# Patient Record
Sex: MALE | Race: WHITE | NOT HISPANIC OR LATINO | Employment: OTHER | URBAN - METROPOLITAN AREA
[De-identification: names, ages, dates, MRNs, and addresses within clinical notes are randomized per-mention and may not be internally consistent; named-entity substitution may affect disease eponyms.]

---

## 2019-06-23 ENCOUNTER — HOSPITAL ENCOUNTER (EMERGENCY)
Facility: HOSPITAL | Age: 44
Discharge: HOME | End: 2019-06-23
Payer: MEDICARE

## 2019-06-23 VITALS
RESPIRATION RATE: 16 BRPM | HEART RATE: 80 BPM | WEIGHT: 240 LBS | SYSTOLIC BLOOD PRESSURE: 150 MMHG | OXYGEN SATURATION: 97 % | DIASTOLIC BLOOD PRESSURE: 78 MMHG | TEMPERATURE: 97.6 F

## 2019-06-23 DIAGNOSIS — H11.31 SUBCONJUNCTIVAL HEMORRHAGE OF RIGHT EYE: Primary | ICD-10-CM

## 2019-06-23 PROCEDURE — 99283 EMERGENCY DEPT VISIT LOW MDM: CPT

## 2019-06-23 NOTE — ED PROVIDER NOTES
History  Chief Complaint   Patient presents with    Eye Problem     This afternoon patient looked in the bathroom ear and noticed that his right eye had an area that was red  Patient denies eye injury or getting anything in his eye today, denies visual disturbance  Patient wears glasses but did not bring them to the ER today, visual acuity is poor about glasses  History provided by:  Patient  Eye Problem   Location:  Right eye  Chronicity:  New  Relieved by:  Nothing  Worsened by:  Nothing  Ineffective treatments:  None tried  Associated symptoms: redness    Associated symptoms: no blurred vision, no decreased vision, no discharge, no headaches, no inflammation, no itching, no nausea, no numbness, no swelling, no tearing and no weakness        None       Past Medical History:   Diagnosis Date    Cleft hard palate        History reviewed  No pertinent surgical history  History reviewed  No pertinent family history  I have reviewed and agree with the history as documented  Social History     Tobacco Use    Smoking status: Current Every Day Smoker     Packs/day: 0 50    Smokeless tobacco: Never Used   Substance Use Topics    Alcohol use: Not Currently    Drug use: Never        Review of Systems   Constitutional: Negative for chills and fever  HENT: Negative for congestion, rhinorrhea and sore throat  Eyes: Positive for redness  Negative for blurred vision, discharge and itching  Respiratory: Negative for cough and shortness of breath  Cardiovascular: Negative for chest pain  Gastrointestinal: Negative for nausea  Musculoskeletal: Negative for back pain  Skin: Negative for rash  Neurological: Negative for weakness, numbness and headaches  Physical Exam  Physical Exam   Constitutional: He is oriented to person, place, and time  He appears well-developed and well-nourished  HENT:   Head: Normocephalic and atraumatic     Right Ear: External ear normal    Left Ear: External ear normal    Nose: Nose normal    Mouth/Throat: Oropharynx is clear and moist    Eyes: Pupils are equal, round, and reactive to light  EOM are normal    Right medial conjunctiva with hemorrhage   Neck: Normal range of motion  Neck supple  Cardiovascular: Normal rate, regular rhythm and normal heart sounds  Pulmonary/Chest: Effort normal and breath sounds normal    Neurological: He is alert and oriented to person, place, and time  Skin: Skin is warm and dry  Capillary refill takes less than 2 seconds  Psychiatric: He has a normal mood and affect  His behavior is normal    Nursing note and vitals reviewed  Vital Signs  ED Triage Vitals [06/23/19 1915]   Temperature Pulse Respirations Blood Pressure SpO2   97 6 °F (36 4 °C) 85 16 151/81 97 %      Temp src Heart Rate Source Patient Position - Orthostatic VS BP Location FiO2 (%)   -- -- -- -- --      Pain Score       5           Vitals:    06/23/19 1915   BP: 151/81   Pulse: 85         Visual Acuity  Visual Acuity      Most Recent Value   Visual acuity R eye is  20/70   Visual acuity Left eye is  20/50   Visual acuity in both eyes is  20/50   Unable to obtain visual acuity due to  not wearing glasses   L Pupil Shape  Round   R Pupil Shape  Round          ED Medications  Medications - No data to display    Diagnostic Studies  Results Reviewed     None                 No orders to display              Procedures  Procedures       ED Course                               MDM    Disposition  Final diagnoses:   Subconjunctival hemorrhage of right eye     Time reflects when diagnosis was documented in both MDM as applicable and the Disposition within this note     Time User Action Codes Description Comment    6/23/2019  7:30 PM Marcos Mendez Add [H11 31] Subconjunctival hemorrhage of right eye       ED Disposition     ED Disposition Condition Date/Time Comment    Discharge Stable Sun Jun 23, 2019  7:30 PM Jennifer Alberto discharge to home/self care  Follow-up Information     Follow up With Specialties Details Why [address] [facility] Ophthalmology - [facility] Ophthalmology Call  As needed [address] [place] [address]  [phone]            Patient's Medications    No medications on file     No discharge procedures on file      ED Provider  Electronically Signed by           Emily Nuñez PA-C  06/23/19 2307

## 2019-10-27 ENCOUNTER — APPOINTMENT (EMERGENCY)
Dept: CT IMAGING | Facility: HOSPITAL | Age: 44
End: 2019-10-27
Payer: MEDICARE

## 2019-10-27 ENCOUNTER — HOSPITAL ENCOUNTER (OUTPATIENT)
Facility: HOSPITAL | Age: 44
Setting detail: OBSERVATION
Discharge: HOME HEALTH | End: 2019-10-28
Attending: INTERNAL MEDICINE | Admitting: HOSPITALIST
Payer: MEDICARE

## 2019-10-27 ENCOUNTER — APPOINTMENT (EMERGENCY)
Dept: RADIOLOGY | Facility: HOSPITAL | Age: 44
End: 2019-10-27
Payer: MEDICARE

## 2019-10-27 DIAGNOSIS — R56.9 SEIZURE (HCC): ICD-10-CM

## 2019-10-27 DIAGNOSIS — G40.909 SEIZURE DISORDER (HCC): Primary | ICD-10-CM

## 2019-10-27 DIAGNOSIS — F79 MENTALLY CHALLENGED: Chronic | ICD-10-CM
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
## 2019-10-27 PROBLEM — Z72.0 TOBACCO ABUSE: Chronic | Status: ACTIVE | Noted: 2019-10-27

## 2019-10-27 LAB
ALBUMIN SERPL BCP-MCNC: 4.2 G/DL (ref 3.5–5.7)
ALP SERPL-CCNC: 170 U/L (ref 40–150)
ALT SERPL W P-5'-P-CCNC: 15 U/L (ref 7–52)
AMPHETAMINES SERPL QL SCN: NEGATIVE
ANION GAP SERPL CALCULATED.3IONS-SCNC: 12 MMOL/L (ref 4–13)
AST SERPL W P-5'-P-CCNC: 22 U/L (ref 13–39)
BARBITURATES UR QL: NEGATIVE
BASOPHILS # BLD AUTO: 0 THOUSANDS/ΜL (ref 0–0.1)
BASOPHILS NFR BLD AUTO: 1 % (ref 0–2)
BENZODIAZ UR QL: NEGATIVE
BILIRUB SERPL-MCNC: 0.6 MG/DL (ref 0.2–1)
BILIRUB UR QL STRIP: NEGATIVE
BUN SERPL-MCNC: 10 MG/DL (ref 7–25)
CALCIUM SERPL-MCNC: 9.3 MG/DL (ref 8.6–10.5)
CHLORIDE SERPL-SCNC: 101 MMOL/L (ref 98–107)
CLARITY UR: CLEAR
CO2 SERPL-SCNC: 23 MMOL/L (ref 21–31)
COCAINE UR QL: NEGATIVE
COLOR UR: YELLOW
CREAT SERPL-MCNC: 0.84 MG/DL (ref 0.7–1.3)
EOSINOPHIL # BLD AUTO: 0.1 THOUSAND/ΜL (ref 0–0.61)
EOSINOPHIL NFR BLD AUTO: 1 % (ref 0–5)
ERYTHROCYTE [DISTWIDTH] IN BLOOD BY AUTOMATED COUNT: 14.6 % (ref 11.5–14.5)
GFR SERPL CREATININE-BSD FRML MDRD: 108 ML/MIN/1.73SQ M
GLUCOSE SERPL-MCNC: 102 MG/DL (ref 65–99)
GLUCOSE SERPL-MCNC: 88 MG/DL (ref 65–140)
GLUCOSE UR STRIP-MCNC: NEGATIVE MG/DL
HCT VFR BLD AUTO: 42.1 % (ref 42–47)
HGB BLD-MCNC: 14.2 G/DL (ref 14–18)
HGB UR QL STRIP.AUTO: NEGATIVE
KETONES UR STRIP-MCNC: NEGATIVE MG/DL
LEUKOCYTE ESTERASE UR QL STRIP: NEGATIVE
LYMPHOCYTES # BLD AUTO: 1.1 THOUSANDS/ΜL (ref 0.6–4.47)
LYMPHOCYTES NFR BLD AUTO: 15 % (ref 21–51)
MCH RBC QN AUTO: 30.4 PG (ref 26–34)
MCHC RBC AUTO-ENTMCNC: 33.7 G/DL (ref 31–37)
MCV RBC AUTO: 90 FL (ref 81–99)
METHADONE UR QL: NEGATIVE
MONOCYTES # BLD AUTO: 0.6 THOUSAND/ΜL (ref 0.17–1.22)
MONOCYTES NFR BLD AUTO: 8 % (ref 2–12)
NEUTROPHILS # BLD AUTO: 5.9 THOUSANDS/ΜL (ref 1.4–6.5)
NEUTS SEG NFR BLD AUTO: 76 % (ref 42–75)
NITRITE UR QL STRIP: NEGATIVE
OPIATES UR QL SCN: NEGATIVE
PCP UR QL: NEGATIVE
PH UR STRIP.AUTO: 5.5 [PH]
PLATELET # BLD AUTO: 321 THOUSANDS/UL (ref 149–390)
PMV BLD AUTO: 8.1 FL (ref 8.6–11.7)
POTASSIUM SERPL-SCNC: 3.5 MMOL/L (ref 3.5–5.5)
PROT SERPL-MCNC: 7.3 G/DL (ref 6.4–8.9)
PROT UR STRIP-MCNC: NEGATIVE MG/DL
RBC # BLD AUTO: 4.66 MILLION/UL (ref 4.3–5.9)
SODIUM SERPL-SCNC: 136 MMOL/L (ref 134–143)
SP GR UR STRIP.AUTO: <=1.005 (ref 1–1.03)
THC UR QL: NEGATIVE
TROPONIN I SERPL-MCNC: <0.03 NG/ML
UROBILINOGEN UR QL STRIP.AUTO: 0.2 E.U./DL
WBC # BLD AUTO: 7.7 THOUSAND/UL (ref 4.8–10.8)

## 2019-10-27 PROCEDURE — 82948 REAGENT STRIP/BLOOD GLUCOSE: CPT

## 2019-10-27 PROCEDURE — 96374 THER/PROPH/DIAG INJ IV PUSH: CPT

## 2019-10-27 PROCEDURE — 93005 ELECTROCARDIOGRAM TRACING: CPT

## 2019-10-27 PROCEDURE — 81003 URINALYSIS AUTO W/O SCOPE: CPT | Performed by: INTERNAL MEDICINE

## 2019-10-27 PROCEDURE — 99285 EMERGENCY DEPT VISIT HI MDM: CPT

## 2019-10-27 PROCEDURE — 84484 ASSAY OF TROPONIN QUANT: CPT | Performed by: INTERNAL MEDICINE

## 2019-10-27 PROCEDURE — 85025 COMPLETE CBC W/AUTO DIFF WBC: CPT | Performed by: INTERNAL MEDICINE

## 2019-10-27 PROCEDURE — 72100 X-RAY EXAM L-S SPINE 2/3 VWS: CPT

## 2019-10-27 PROCEDURE — 99220 PR INITIAL OBSERVATION CARE/DAY 70 MINUTES: CPT | Performed by: NURSE PRACTITIONER

## 2019-10-27 PROCEDURE — 80053 COMPREHEN METABOLIC PANEL: CPT | Performed by: INTERNAL MEDICINE

## 2019-10-27 PROCEDURE — 70450 CT HEAD/BRAIN W/O DYE: CPT

## 2019-10-27 PROCEDURE — 36415 COLL VENOUS BLD VENIPUNCTURE: CPT | Performed by: INTERNAL MEDICINE

## 2019-10-27 PROCEDURE — 80307 DRUG TEST PRSMV CHEM ANLYZR: CPT | Performed by: INTERNAL MEDICINE

## 2019-10-27 RX ORDER — FOLIC ACID 1 MG/1
1 TABLET ORAL DAILY
Status: DISCONTINUED | OUTPATIENT
Start: 2019-10-28 | End: 2019-10-28 | Stop reason: HOSPADM

## 2019-10-27 RX ORDER — NICOTINE 21 MG/24HR
1 PATCH, TRANSDERMAL 24 HOURS TRANSDERMAL DAILY
Status: DISCONTINUED | OUTPATIENT
Start: 2019-10-28 | End: 2019-10-28 | Stop reason: HOSPADM

## 2019-10-27 RX ORDER — THIAMINE MONONITRATE (VIT B1) 100 MG
100 TABLET ORAL DAILY
Status: DISCONTINUED | OUTPATIENT
Start: 2019-10-28 | End: 2019-10-28 | Stop reason: HOSPADM

## 2019-10-27 RX ORDER — ACETAMINOPHEN 325 MG/1
650 TABLET ORAL EVERY 6 HOURS PRN
Status: DISCONTINUED | OUTPATIENT
Start: 2019-10-27 | End: 2019-10-28 | Stop reason: HOSPADM

## 2019-10-27 RX ORDER — ONDANSETRON 2 MG/ML
4 INJECTION INTRAMUSCULAR; INTRAVENOUS EVERY 6 HOURS PRN
Status: DISCONTINUED | OUTPATIENT
Start: 2019-10-27 | End: 2019-10-28 | Stop reason: HOSPADM

## 2019-10-27 RX ORDER — LORAZEPAM 2 MG/ML
1 INJECTION INTRAMUSCULAR EVERY 8 HOURS PRN
Status: DISCONTINUED | OUTPATIENT
Start: 2019-10-27 | End: 2019-10-28 | Stop reason: HOSPADM

## 2019-10-27 RX ORDER — LORAZEPAM 2 MG/ML
1 INJECTION INTRAMUSCULAR ONCE
Status: COMPLETED | OUTPATIENT
Start: 2019-10-27 | End: 2019-10-27

## 2019-10-27 RX ADMIN — LORAZEPAM 1 MG: 2 INJECTION INTRAMUSCULAR; INTRAVENOUS at 19:41

## 2019-10-27 NOTE — ED NOTES
Pt's friend visisting with pt, he came from room stating that the pt was having a seizure  This rn witnessed the pt to be convulsing his arms and legs with his eyes closed  This lasted less than 30 seconds  Dr Juliano Sanchez came to bedside  Pt then again began to shake his arms and legs  I told pt in a loud voice to "stop" and he did  Pt then opened his eyes and conversed with me  Pt did receive 1mg IV ativan  Will continue to monitor        Trevor Portillo RN  10/27/19 5608

## 2019-10-27 NOTE — ED PROVIDER NOTES
History  Chief Complaint   Patient presents with   Osker Ok Fall     pt's family heard bang from pt's room, per ems family states that the pt "appeared to have a seizure " Pt only reports leg pain at this time  43-year-old rise the EMS status post fall  Patient suffers from mental retardation and is unable to give any type of accurate history  States sometimes his legs just feel weak and they give out  Patient believes he landed on his backside his gluteus area and complains of no pain at this time  He does complain of occasional intermittent bilateral leg weakness  Patient's mother went upstairs when she heard him fall states he was laying on the floor and she was unsure if he was having a seizure he was awake and alert  There is no report of urinary or fecal incontinence, no facial droop or hemiparesis  Patient is not been ill recently and he is not sure if he tripped and fell or if his legs just gave way  At this time he has no complaint at all  The patient is having a a apparent seizure in the emergency room at this time  He is flailing his legs and rolling his head back and forth  There is no nausea or vomiting  Is not responding at this time  The episode lasted about 15-20 seconds  His friend and mother are present at the bedside at this time and states this was all going on upstairs  None       Past Medical History:   Diagnosis Date    Cleft hard palate     Seizures ([facility] [address] )        History reviewed  No pertinent surgical history  History reviewed  No pertinent family history  I have reviewed and agree with the history as documented  Social History     Tobacco Use    Smoking status: Current Every Day Smoker     Packs/day: 0 50    Smokeless tobacco: Never Used   Substance Use Topics    Alcohol use: Not Currently    Drug use: Never        Review of Systems   Constitutional: Negative  HENT: Negative  Eyes: Negative  Respiratory: Negative  Cardiovascular: Negative  Gastrointestinal: Negative  Endocrine: Negative  Genitourinary: Negative  Musculoskeletal: Negative  Skin: Negative  Neurological: Negative  Hematological: Negative  Psychiatric/Behavioral: Negative  Physical Exam  Physical Exam   Constitutional: He appears well-developed and well-nourished  HENT:   Head: Normocephalic and atraumatic  Right Ear: External ear normal    Left Ear: External ear normal    Eyes: Pupils are equal, round, and reactive to light  EOM are normal    Neck: Normal range of motion  Neck supple  Cardiovascular: Normal rate, regular rhythm, normal heart sounds and intact distal pulses  Pulmonary/Chest: Effort normal and breath sounds normal    Abdominal: Soft  Bowel sounds are normal    Musculoskeletal: Normal range of motion  Neurological: He is alert  Patient is alert oriented x2 only  The patient suffers from MR  Skin: Skin is warm and dry  Psychiatric: He has a normal mood and affect  His behavior is normal  Judgment and thought content normal    Patient mood and behavior normal at this time considering the patient suffers from MR he is comfortable he has answered questions believe appropriate for his mental status  His mother is on his way here and will discuss the case with him  Nursing note and vitals reviewed        Vital Signs  ED Triage Vitals [10/27/19 1915]   Temperature Pulse Respirations Blood Pressure SpO2   98 7 °F (37 1 °C) 93 18 164/96 95 %      Temp Source Heart Rate Source Patient Position - Orthostatic VS BP Location FiO2 (%)   Temporal Monitor Sitting Left arm --      Pain Score       3           Vitals:    10/28/19 1000 10/28/19 1100 10/28/19 1200 10/28/19 1300   BP: 131/69 121/77 124/76 137/72   Pulse: 78 78 77 84   Patient Position - Orthostatic VS: Lying Lying Lying Lying         Visual Acuity      ED Medications  Medications   LORazepam (ATIVAN) 2 mg/mL injection 1 mg (1 mg Intravenous Given 10/27/19 1941)   influenza vaccine, quadrivalent (FLUARIX) IM injection 0 5 mL (0 5 mL Intramuscular Given 10/28/19 1423)       Diagnostic Studies  Results Reviewed     Procedure Component Value Units Date/Time    Rapid drug screen, urine [[id number]]  (Normal) Collected:  10/27/19 2019    Lab Status:  Final result Specimen:  Urine, Clean Catch Updated:  10/27/19 2039     Amph/Meth UR Negative     Barbiturate Ur Negative     Benzodiazepine Urine Negative     Cocaine Urine Negative     Methadone Urine Negative     Opiate Urine Negative     PCP Ur Negative     THC Urine Negative    Narrative:       FOR MEDICAL PURPOSES ONLY  IF CONFIRMATION NEEDED PLEASE CONTACT THE LAB WITHIN 5 DAYS      Drug Screen Cutoff Levels:  AMPHETAMINE/METHAMPHETAMINES  1000 ng/mL  BARBITURATES     200 ng/mL  BENZODIAZEPINES     200 ng/mL  COCAINE      300 ng/mL  METHADONE      300 ng/mL  OPIATES      300 ng/mL  PHENCYCLIDINE     25 ng/mL  THC       50 ng/mL      UA w Reflex to Microscopic w Reflex to Culture [[id number]]  (Abnormal) Collected:  10/27/19 2019    Lab Status:  Final result Specimen:  Urine, Clean Catch Updated:  10/27/19 2027     Color, UA Yellow     Clarity, UA Clear     Specific Gravity, UA <=1 005     pH, UA 5 5     Leukocytes, UA Negative     Nitrite, UA Negative     Protein, UA Negative mg/dl      Glucose, UA Negative mg/dl      Ketones, UA Negative mg/dl      Urobilinogen, UA 0 2 E U /dl      Bilirubin, UA Negative     Blood, UA Negative    Troponin I [[id number]]  (Normal) Collected:  10/27/19 1928    Lab Status:  Final result Specimen:  Blood from Hand, Left Updated:  10/27/19 1955     Troponin I <0 03 ng/mL     Comprehensive metabolic panel [[id number]]  (Abnormal) Collected:  10/27/19 1928    Lab Status:  Final result Specimen:  Blood from Hand, Left Updated:  10/27/19 1954     Sodium 136 mmol/L      Potassium 3 5 mmol/L      Chloride 101 mmol/L      CO2 23 mmol/L      ANION GAP 12 mmol/L      BUN 10 mg/dL      Creatinine 0 84 mg/dL      Glucose 102 mg/dL      Calcium 9 3 mg/dL      AST 22 U/L      ALT 15 U/L      Alkaline Phosphatase 170 U/L      Total Protein 7 3 g/dL      Albumin 4 2 g/dL      Total Bilirubin 0 60 mg/dL      eGFR 108 ml/min/1 73sq m     Narrative:       [place] guidelines for Chronic Kidney Disease (CKD):     Stage 1 with normal or high GFR (GFR > 90 mL/min/1 73 square meters)    Stage 2 Mild CKD (GFR = 60-89 mL/min/1 73 square meters)    Stage 3A Moderate CKD (GFR = 45-59 mL/min/1 73 square meters)    Stage 3B Moderate CKD (GFR = 30-44 mL/min/1 73 square meters)    Stage 4 Severe CKD (GFR = 15-29 mL/min/1 73 square meters)    Stage 5 End Stage CKD (GFR <15 mL/min/1 73 square meters)  Note: GFR calculation is accurate only with a steady state creatinine    CBC and differential [[id number]]  (Abnormal) Collected:  10/27/19 1928    Lab Status:  Final result Specimen:  Blood from Hand, Left Updated:  10/27/19 1935     WBC 7 70 Thousand/uL      RBC 4 66 Million/uL      Hemoglobin 14 2 g/dL      Hematocrit 42 1 %      MCV 90 fL      MCH 30 4 pg      MCHC 33 7 g/dL      RDW 14 6 %      MPV 8 1 fL      Platelets 594 Thousands/uL      Neutrophils Relative 76 %      Lymphocytes Relative 15 %      Monocytes Relative 8 %      Eosinophils Relative 1 %      Basophils Relative 1 %      Neutrophils Absolute 5 90 Thousands/µL      Lymphocytes Absolute 1 10 Thousands/µL      Monocytes Absolute 0 60 Thousand/µL      Eosinophils Absolute 0 10 Thousand/µL      Basophils Absolute 0 00 Thousands/µL                  XR spine lumbar 2 or 3 views injury   ED Interpretation by Scooby Turk MD (10/27 2017)   X-RAY LS SPINE SHOWS NORMAL DISC SPACE HEIGHT NO COMPRESSION NO EVIDENCE OF LS FRACTURE  Final Result by Francisco Noel MD (10/28 1686)      Unremarkable examination           Workstation performed: [id number]         CT head without contrast   ED Interpretation by Scooby Turk MD (10/27 2052)      No acute intracranial abnormality  Workstation performed: [id number]         Final Result by Debbie Reyna MD (10/27 2029)      No acute intracranial abnormality  Workstation performed: [id number]                    Procedures  Procedures       ED Course                               MDM    Disposition  Final diagnoses:   Seizure [facility])     Time reflects when diagnosis was documented in both MDM as applicable and the Disposition within this note     Time User Action Codes Description Comment    10/27/2019  9:13 PM Wenda John Add [C77 [id number]] Seizure disorder ([facility] [address] )     10/27/2019  9:13 PM Wenda John Modify [G40 [id number]] Seizure disorder ([facility] [address] )     10/27/2019  9:13 PM Wenda John Add Mikhail Tete Mentally challenged     10/27/2019  9:13 PM Wenda John Modify Mikhail Tete Mentally challenged     11/1/2019 12:56 AM Trinity Anand Add [R56 9] Seizure [facility])       ED Disposition     ED Disposition Condition Date/Time Comment    Admit Stable Sun Oct 27, 2019  8:59 PM Case was discussed with PA and the patient's admission status was agreed to be to the service of Dr Mace Chain up With Specialties Details Why Contact Info    Lenin Bolaños [facility]  Follow up A representative from [facility] will contact you to begin services [address] [place] [address]  [phone]            There are no discharge medications for this patient  Outpatient Discharge Orders   Walker Rolling     Ambulatory Referral to Home Health   Standing Status: Future Standing Exp  Date: 10/28/20      Ambulatory referral to Neurology   Standing Status: Future Standing Exp   Date: 10/28/20      Call provider for:  severe uncontrolled pain     Call provider for:  difficulty breathing, headache or visual disturbances     Call provider for:  persistent dizziness or light-headedness     Activity as tolerated     No driving until       ED Provider  Electronically Signed by           Floresita Espinosa MD  10/27/19 8417       Floresita Espinosa MD  10/27/19 1847       Floresita Espinosa MD  11/01/19 0958       Floresita Espinosa MD  11/01/19 2092       Floresita Espinosa MD  11/01/19 2541

## 2019-10-28 VITALS
OXYGEN SATURATION: 95 % | RESPIRATION RATE: 25 BRPM | BODY MASS INDEX: 30.42 KG/M2 | HEART RATE: 84 BPM | DIASTOLIC BLOOD PRESSURE: 72 MMHG | WEIGHT: 236.99 LBS | SYSTOLIC BLOOD PRESSURE: 137 MMHG | TEMPERATURE: 98.1 F | HEIGHT: 74 IN

## 2019-10-28 LAB
ALBUMIN SERPL BCP-MCNC: 4 G/DL (ref 3.5–5.7)
ALP SERPL-CCNC: 158 U/L (ref 40–150)
ALT SERPL W P-5'-P-CCNC: 15 U/L (ref 7–52)
ANION GAP SERPL CALCULATED.3IONS-SCNC: 10 MMOL/L (ref 4–13)
AST SERPL W P-5'-P-CCNC: 21 U/L (ref 13–39)
ATRIAL RATE: 79 BPM
BASOPHILS # BLD AUTO: 0 THOUSANDS/ΜL (ref 0–0.1)
BASOPHILS NFR BLD AUTO: 1 % (ref 0–2)
BILIRUB SERPL-MCNC: 0.7 MG/DL (ref 0.2–1)
BUN SERPL-MCNC: 11 MG/DL (ref 7–25)
CALCIUM SERPL-MCNC: 9.4 MG/DL (ref 8.6–10.5)
CHLORIDE SERPL-SCNC: 104 MMOL/L (ref 98–107)
CO2 SERPL-SCNC: 25 MMOL/L (ref 21–31)
CREAT SERPL-MCNC: 0.84 MG/DL (ref 0.7–1.3)
EOSINOPHIL # BLD AUTO: 0.2 THOUSAND/ΜL (ref 0–0.61)
EOSINOPHIL NFR BLD AUTO: 2 % (ref 0–5)
ERYTHROCYTE [DISTWIDTH] IN BLOOD BY AUTOMATED COUNT: 14.6 % (ref 11.5–14.5)
EST. AVERAGE GLUCOSE BLD GHB EST-MCNC: 117 MG/DL
GFR SERPL CREATININE-BSD FRML MDRD: 108 ML/MIN/1.73SQ M
GLUCOSE P FAST SERPL-MCNC: 101 MG/DL (ref 65–99)
GLUCOSE SERPL-MCNC: 101 MG/DL (ref 65–99)
GLUCOSE SERPL-MCNC: 82 MG/DL (ref 65–140)
GLUCOSE SERPL-MCNC: 91 MG/DL (ref 65–140)
HBA1C MFR BLD: 5.7 % (ref 4.2–6.3)
HCT VFR BLD AUTO: 41.1 % (ref 42–47)
HGB BLD-MCNC: 14.5 G/DL (ref 14–18)
LYMPHOCYTES # BLD AUTO: 1.6 THOUSANDS/ΜL (ref 0.6–4.47)
LYMPHOCYTES NFR BLD AUTO: 22 % (ref 21–51)
MCH RBC QN AUTO: 31.3 PG (ref 26–34)
MCHC RBC AUTO-ENTMCNC: 35.2 G/DL (ref 31–37)
MCV RBC AUTO: 89 FL (ref 81–99)
MONOCYTES # BLD AUTO: 0.8 THOUSAND/ΜL (ref 0.17–1.22)
MONOCYTES NFR BLD AUTO: 11 % (ref 2–12)
NEUTROPHILS # BLD AUTO: 4.9 THOUSANDS/ΜL (ref 1.4–6.5)
NEUTS SEG NFR BLD AUTO: 65 % (ref 42–75)
P AXIS: 32 DEGREES
PLATELET # BLD AUTO: 333 THOUSANDS/UL (ref 149–390)
PMV BLD AUTO: 8.7 FL (ref 8.6–11.7)
POTASSIUM SERPL-SCNC: 3.7 MMOL/L (ref 3.5–5.5)
PR INTERVAL: 116 MS
PROLACTIN SERPL-MCNC: 12.4 NG/ML (ref 2.5–17.4)
PROT SERPL-MCNC: 6.8 G/DL (ref 6.4–8.9)
QRS AXIS: 6 DEGREES
QRSD INTERVAL: 92 MS
QT INTERVAL: 388 MS
QTC INTERVAL: 444 MS
RBC # BLD AUTO: 4.62 MILLION/UL (ref 4.3–5.9)
SODIUM SERPL-SCNC: 139 MMOL/L (ref 134–143)
T WAVE AXIS: 5 DEGREES
TSH SERPL DL<=0.05 MIU/L-ACNC: 3.95 UIU/ML (ref 0.45–5.33)
VENTRICULAR RATE: 79 BPM
WBC # BLD AUTO: 7.5 THOUSAND/UL (ref 4.8–10.8)

## 2019-10-28 PROCEDURE — G8978 MOBILITY CURRENT STATUS: HCPCS

## 2019-10-28 PROCEDURE — 83036 HEMOGLOBIN GLYCOSYLATED A1C: CPT | Performed by: NURSE PRACTITIONER

## 2019-10-28 PROCEDURE — 84146 ASSAY OF PROLACTIN: CPT | Performed by: NURSE PRACTITIONER

## 2019-10-28 PROCEDURE — 99204 OFFICE O/P NEW MOD 45 MIN: CPT | Performed by: PSYCHIATRY & NEUROLOGY

## 2019-10-28 PROCEDURE — G8988 SELF CARE GOAL STATUS: HCPCS

## 2019-10-28 PROCEDURE — 85025 COMPLETE CBC W/AUTO DIFF WBC: CPT | Performed by: NURSE PRACTITIONER

## 2019-10-28 PROCEDURE — 97116 GAIT TRAINING THERAPY: CPT

## 2019-10-28 PROCEDURE — G8987 SELF CARE CURRENT STATUS: HCPCS

## 2019-10-28 PROCEDURE — 97163 PT EVAL HIGH COMPLEX 45 MIN: CPT

## 2019-10-28 PROCEDURE — 84443 ASSAY THYROID STIM HORMONE: CPT | Performed by: NURSE PRACTITIONER

## 2019-10-28 PROCEDURE — 99217 PR OBSERVATION CARE DISCHARGE MANAGEMENT: CPT | Performed by: INTERNAL MEDICINE

## 2019-10-28 PROCEDURE — G8979 MOBILITY GOAL STATUS: HCPCS

## 2019-10-28 PROCEDURE — 97167 OT EVAL HIGH COMPLEX 60 MIN: CPT

## 2019-10-28 PROCEDURE — 93010 ELECTROCARDIOGRAM REPORT: CPT | Performed by: INTERNAL MEDICINE

## 2019-10-28 PROCEDURE — 90686 IIV4 VACC NO PRSV 0.5 ML IM: CPT | Performed by: HOSPITALIST

## 2019-10-28 PROCEDURE — 82948 REAGENT STRIP/BLOOD GLUCOSE: CPT

## 2019-10-28 PROCEDURE — G0008 ADMIN INFLUENZA VIRUS VAC: HCPCS | Performed by: HOSPITALIST

## 2019-10-28 PROCEDURE — 80053 COMPREHEN METABOLIC PANEL: CPT | Performed by: NURSE PRACTITIONER

## 2019-10-28 RX ADMIN — NICOTINE 1 PATCH: 21 PATCH, EXTENDED RELEASE TRANSDERMAL at 08:33

## 2019-10-28 RX ADMIN — Medication 100 MG: at 08:32

## 2019-10-28 RX ADMIN — INFLUENZA VIRUS VACCINE 0.5 ML: 15; 15; 15; 15 SUSPENSION INTRAMUSCULAR at 14:27

## 2019-10-28 RX ADMIN — Medication 1 TABLET: at 08:33

## 2019-10-28 RX ADMIN — FOLIC ACID 1 MG: 1 TABLET ORAL at 08:33

## 2019-10-28 NOTE — PLAN OF CARE
Problem: PHYSICAL THERAPY ADULT  Goal: Performs mobility at highest level of function for planned discharge setting  See evaluation for individualized goals  Description  Treatment/Interventions: Functional transfer training, LE strengthening/ROM, Therapeutic exercise, Endurance training, Equipment eval/education, Bed mobility, Gait training, Spoke to nursing, OT  Equipment Recommended: (S) Walker(rolling, does not have at home)    See flowsheet documentation for full assessment, interventions and recommendations  Kelsie Brian, PT     Note:   Prognosis: Good  Problem List: Decreased strength, Decreased endurance, Decreased mobility, Impaired balance, Decreased cognition, Impaired judgement, Decreased safety awareness, Pain  Assessment: Pt is 44 y o  male seen for PT evaluation s/p [facility] w/ Seizure disorder ([facility] [address] )  PT consulted to assess pt's functional mobility and d/c needs  Order placed for PT eval and tx, w/ activity as tolerated order  Comorbidities affecting pt's physical performance at time of assessment include: weakness, seizure d/o, mentally challenged, tobacco abuse, fall  PTA, pt was independent w/ all functional mobility w/ o AD  Personal factors affecting pt at time of IE include: ambulating w/ assistive device, inability to navigate community distances, inability to navigate level surfaces w/o external assistance, unable to perform dynamic tasks in community, positive fall history and limited insight into impairments  Please find objective findings from PT assessment regarding body systems outlined above with impairments and limitations including weakness, impaired balance, decreased endurance, gait deviations, decreased activity tolerance, decreased functional mobility tolerance, decreased safety awareness, impaired judgement and fall risk  The following objective measures performed on IE also reveal limitations: Barthel Index: 60/100   Pt's clinical presentation is currently unstable/unpredictable  Pt to benefit from continued PT tx to address deficits as defined above and maximize level of functional independent mobility and consistency  From PT/mobility standpoint, recommendation at time of d/c would be Home PT with family support and rolling walker pending progress in order to facilitate return to PLOF  Recommendation: Home PT, Home with family support     PT - OK to Discharge: Yes(if medically stable)    See flowsheet documentation for full assessment

## 2019-10-28 NOTE — PROGRESS NOTES
Progress Note - Efrain Dopp 1975, 44 y o  male MRN: [id number]    Unit/Bed#: ICU 06 Encounter: [id number]    Primary Care Provider: No primary care provider on file  Date and time admitted to hospital: 10/27/2019  7:15 PM        * Seizure disorder [facility])  Assessment & Plan  · Patient with no history of seizure disorder  · Will continue neuro checks and seizure precautions  · Neuro consult pending  · CT head/neck negative for any acute findings  · Appears to be at baseline today  · No further seizure-like activity noted  · No sign of infection at this time and electrolytes within normal limits    Fall at home  Assessment & Plan  · Reported fall at home, unknown if patient did hit his head  · CT of the head/neck negative  · Check orthostatic blood pressures Q shift  · PT/OT eval    Tobacco abuse  Assessment & Plan  · Education on importance of quitting  · Nicotine patch    Mentally challenged  Assessment & Plan  · At the patient's baseline      VTE Pharmacologic Prophylaxis: Pharmacologic: Early aggressive ambulation    Patient Centered Rounds: I have performed bedside rounds with nursing staff today  Discussions with Specialists or Other Care Team Provider: yes  Education and Discussions with Family / Patient: yes    Current Length of Stay: 0 day(s)    Current Patient Status: Observation   Certification Statement: The patient will continue to require additional inpatient hospital stay due to Seizure    Discharge Plan:  Pending hospital course    Code Status: Level 1 - Full Code    Subjective:   No overnight events noted  Patient tolerating diet  Denies any pain complaints  Objective:     Vitals:   Temp (24hrs), Av 3 °F (36 8 °C), Min:98 °F (36 7 °C), Max:98 7 °F (37 1 °C)    Temp:  [98 °F (36 7 °C)-98 7 °F (37 1 °C)] 98 °F (36 7 °C)  HR:  [63-93] 66  Resp:  [15-27] 23  BP: (110-164)/(62-96) 117/72  SpO2:  [95 %-97 %] 96 %  Body mass index is 30 43 kg/m²       Input and Output Summary (last 24 hours):     No intake or output data in the 24 hours ending 10/28/19 0844    Physical Exam:     Physical Exam   Constitutional: He appears well-developed  No distress  HENT:   Head: Normocephalic and atraumatic  Eyes: Conjunctivae and EOM are normal    Neck: Normal range of motion  Neck supple  Cardiovascular: Normal rate and regular rhythm  Pulmonary/Chest: Effort normal  No respiratory distress  Abdominal: Soft  He exhibits no distension  There is no tenderness  Musculoskeletal: Normal range of motion  He exhibits no edema  Neurological: He is alert  No cranial nerve deficit  Skin: Skin is warm and dry  Psychiatric:   Patient with history of mental disability, appears to be at baseline with pressured speech however answering questions appropriately       Additional Data:     Labs:    Results from last 7 days   Lab Units 10/28/19  0435   WBC Thousand/uL 7 50   HEMOGLOBIN g/dL 14 5   HEMATOCRIT % 41 1*   PLATELETS Thousands/uL 333   NEUTROS PCT % 65   LYMPHS PCT % 22   MONOS PCT % 11   EOS PCT % 2     Results from last 7 days   Lab Units 10/28/19  0435   POTASSIUM mmol/L 3 7   CHLORIDE mmol/L 104   CO2 mmol/L 25   BUN mg/dL 11   CREATININE mg/dL 0 84   CALCIUM mg/dL 9 4   ALK PHOS U/L 158*   ALT U/L 15   AST U/L 21         Results from last 7 days   Lab Units 10/28/19  0739 10/27/19  2300   POC GLUCOSE mg/dl 91 88           * I Have Reviewed All Lab Data Listed Above  * Additional Pertinent Lab Tests Reviewed:  [place] Admission  Reviewed    Imaging:  Imaging Reports Reviewed Today Include:  No new imaging    Recent Cultures (last 7 days):           Last 24 Hours Medication List:     Current Facility-Administered Medications:  acetaminophen 650 mg Oral Q6H PRN CHAZ Hein   folic acid 1 mg Oral Daily CHAZ Hein   influenza vaccine 0 5 mL Intramuscular Once Lashanda Dunne MD   insulin lispro 1-5 Units Subcutaneous TID AC Jeimy Barron insulin lispro 1-5 Units Subcutaneous HS Jeimy Lee, CHAZ   LORazepam 1 mg Intramuscular Q8H PRN Jeimy Lee, CRNP   multivitamin-minerals 1 tablet Oral Daily Jeimy Lee, CRNP   nicotine 1 patch Transdermal Daily Jeimy Lee, TANNP   ondansetron 4 mg Intravenous Q6H PRN Jeimy Lee, TANNP   thiamine 100 mg Oral Daily CHAZ Hein        Today, Patient Was Seen By: Jonny Fierro MD    ** Please Note: Dictation voice to text software may have been used in the creation of this document   **

## 2019-10-28 NOTE — OCCUPATIONAL THERAPY NOTE
Occupational Therapy Evaluation      Nichole Loredo    10/28/2019    Patient Active Problem List   Diagnosis    Seizure disorder ([address] )    Mentally challenged    Tobacco abuse    Fall at home       Past Medical History:   Diagnosis Date    Cleft hard palate           10/28/19 0819   Note Type   Note type Eval only   Restrictions/Precautions   Weight Bearing Precautions Per Order No   Other Precautions Telemetry;Multiple lines; Fall Risk;Pain   Pain Assessment   Pain Assessment 0-10   Pain Score 8   Pain Type Acute pain   Pain Location Leg   Pain Orientation Bilateral   Pain Descriptors Aching   Pain Frequency Constant/continuous   Pain Onset Ongoing   Hospital Pain Intervention(s) Medication (See MAR); Repositioned; Ambulation/increased activity   Home Living   Type of [address] [place] [address] Two level  (11 steps to bedroom)   Bathroom Shower/Tub Tub/shower unit   Bathroom Toilet Standard   Bathroom Equipment   (no DME at baseline)   [address]   (no AD used at baseline )   Prior Function   Level of [place] Independent with ADLs and functional mobility   Lives With [employer] Help From Family   ADL Assistance Independent   IADLs Needs assistance  (family assists with cooking, cleaning and laundry )   Falls in the last 6 months 1 to 4  (1 PTA, (+) fall history)   Comments (-) driving    Lifestyle   Autonomy Patient reporting being independent with ADLs, ambulatory with no AD, and lives with family in a two story house   Reciprocal Relationships family    Intrinsic Gratification enjoys playing video games    ADL   Eating Assistance 6  Modified independent   Grooming Assistance 5  Supervision/Setup   UB Bathing Assistance 5  Supervision/Setup   LB Bathing Assistance 5  Supervision/Setup   UB Dressing Assistance 5  Supervision/Setup   LB Dressing Assistance 5  Supervision/Setup   Toileting Assistance  5  Supervision/Setup   Bed Mobility   Supine to Sit 5 Supervision   Additional items Assist x 1;HOB elevated; Bedrails;Verbal cues   Sit to Supine 5  Supervision   Additional items Assist x 1;HOB elevated;Verbal cues   Transfers   Sit to Stand 4  Minimal assistance   Additional items Assist x 2;Verbal cues; Impulsive   Stand to Sit 4  Minimal assistance   Additional items Assist x 1;Verbal cues   Functional Mobility   Functional Mobility 4  Minimal assistance   Additional items Rolling walker   Balance   Static Sitting Good   Dynamic Sitting Fair +   Static Standing Fair   Dynamic Standing Fair -   Activity Tolerance   Activity Tolerance Patient limited by pain   Nurse Made Aware ZENA Ayon verbalized pt appropriate for therapy and was present during assessment    RUE Assessment   RUE Assessment WFL  (grossly 4/5 MMT)   LUE Assessment   LUE Assessment WFL  (grossly 4/5 MMT)   Hand Function   Gross Motor Coordination Functional   Fine Motor Coordination Functional   Cognition   Overall Cognitive Status WFL   Arousal/Participation Alert; Responsive; Cooperative   Attention Within functional limits   Orientation Level Oriented X4   Memory Within functional limits   Following Commands Follows one step commands without difficulty   Comments Pt agreeable to OT eval    Assessment   Limitation Decreased ADL status; Decreased Safe judgement during ADL;Decreased endurance;Decreased self-care trans;Decreased high-level ADLs   Prognosis Good   Assessment Pt is a 44 y o  male who was admitted to [address] [place] [address] on 10/27/2019 with Seizure disorder ([facility] )  At this time, patient is also affected by the comorbidities of: mentally challenged, tobacco abuse and fall at home  Additionally, patient is affected by the following personal factors:steps to enter environment, difficulty performing ADLS and difficulty performing IADLS   Orders placed for OT evaluation/treatment with up with assistance orders   Prior to admission, Patient reporting being independent with ADLs, ambulatory with no AD, and lives with family in a two story house  Upon OT evaluation, patient requires supervision/set-up for UB ADLs and supervision/set-up for LB ADLs  Occupational performance is affected by the following deficits: weakness, decreased balance, decreased tolerance, impaired sequencing, impaired problem solving and increased pain  Based on the following information, patient would benefit from continued skilled OT treatment while in the hospital to address deficits and maximize level of functional independence with ADL's and functional mobility  Occupational Performance areas to address include: grooming, bathing/shower, toilet hygiene, dressing, functional mobility and clothing management  From OT standpoint, recommendation at time of d/c would be Home OT vs Home with family support pending progress  Goals   Patient Goals to feel better   Plan   Treatment Interventions ADL retraining;Functional transfer training;UE strengthening/ROM; Endurance training; Compensatory technique education   Goal Expiration Date 11/07/19   OT Treatment Day 0   OT Frequency 3-5x/wk   Recommendation   OT Discharge Recommendation Home OT vs  Home with family support pending progress    OT - OK to Discharge Yes  (Once medically cleared)   Barthel Index   Feeding 10   Bathing 0   Grooming Score 5   Dressing Score 5   Bladder Score 5   Bowels Score 10   Toilet Use Score 5   Transfers (Bed/Chair) Score 10   Mobility (Level Surface) Score 10   Stairs Score 0   Barthel Index Score 60     GOALS:    *ADL transfers with (I) for inc'd independence with ADLs/purposeful tasks    *UB ADL with (I) for inc'd independence with self cares    *LB ADL with (I) using AE prn for inc'd independence with self cares    *Toileting with (I) for clothing management and hygiene for return to PLOF with personal care    *Increase static stand balance to good and dyn stand balance to fair+ for inc'd safety with standing purposeful tasks    *Increase stand tolerance x8 m for inc'd tolerance with standing purposeful tasks    *Participate in 10m UE therex to increase overall stamina/activity tolerance for purposeful tasks    *Bed mobility- (I) for inc'd independence to manage own comfort and initiate EOB & OOB purposeful tasks    *Patient will verbalize 3 safety awareness/ principles to prevent falls in the home setting  *Patient will increase OOB/sitting tolerance to 2-4 hours per day to increase participation in self-care and leisure tasks with no s/s of exertion           Darien Sapp MS, OTR/L

## 2019-10-28 NOTE — DISCHARGE INSTRUCTIONS
Cigarette Smoking and Your Health, Ambulatory Care   GENERAL INFORMATION:   What are the risks to my health if I smoke? Chemicals in tobacco are addictive and damage every cell in your body  All tobacco products are dangerous to you and to nonsmokers who breathe your secondhand smoke  Even if you are a light smoker or a social smoker, you have an increased risk for cancer, heart disease, and lung disease  If you are pregnant or have diabetes, smoking increases your risk for complications  What are the benefits to my health if I stop smoking? · Lower risk of cancer, heart disease, blood clots, heart attack, and stroke    · Lower risk of diabetes complications, such as kidney, artery, or eye disease, and nerve damage that can result in amputations    · Lower risk of lung infections and diseases, such as pneumonia, asthma, chronic bronchitis, and emphysema           · Increased benefits of chemotherapy if you already have cancer, and decreased risk for cancer returning after treatment    · Improved circulation, allowing more oxygen to be delivered to your body    · Improved ability to heal and to fight infections  What are the benefits to the health of others if I stop smoking? · Lower risk of lung cancer and heart disease in nonsmokers    · Lower risk of miscarriage, early delivery, low birth weight, and stillbirth    · Lower risk of SIDS, obesity, developmental delay, ear infections, colds, pneumonia, bronchitis, asthma, and ADHD in your child  Where can I find more information and support to stop smoking? It is never too late to stop smoking  Ask your healthcare provider for more information if you need help  · [employer]  [place]  Phone: 4- [phone] - [phone]  Web Address: [employer]  Follow up with your healthcare provider as directed:  Write down your questions so you remember to ask them during your visits  CARE AGREEMENT:   You have the right to help plan your care   Learn about your health condition and how it may be treated  Discuss treatment options with your caregivers to decide what care you want to receive  You always have the right to refuse treatment  The above information is an  only  It is not intended as medical advice for individual conditions or treatments  Talk to your doctor, nurse or pharmacist before following any medical regimen to see if it is safe and effective for you  © 2014 [address] is for End User's use only and may not be sold, redistributed or otherwise used for commercial purposes  All illustrations and images included in CareNotes® are the copyrighted property of [employer]  or Karthik Gómez  New-Onset Seizure in Adults   AMBULATORY CARE:   A seizure  is a burst of electrical activity in your brain  A seizure may start in one part of your brain, or both sides may be affected  The seizure may last a few seconds or up to 5 minutes  A new-onset seizure is a seizure that happens for the first time  Some common triggers are alcohol, drugs, lack of sleep, fever, or an infection  High or low blood sugar levels, pregnancy, a head injury, or a stroke could also trigger a seizure  The cause of your seizure may not be known  You have a higher risk for another seizure within the next 2 years  Signs and symptoms of a seizure: You may have symptoms before the seizure starts  This is called an aura  Examples include dizziness, anxiety, or flashing bright lights  You may have symptoms of one type of a seizure or a combination of different types:  · A generalized seizure  may affect both sides of the brain  After you have a generalized seizure you may have a headache or feel irritable  The following are different types of generalized seizures:    ¨ A tonic, clonic, or tonic-clonic seizure  usually involves the whole body  A clonic seizure involves jerking body movements  A tonic seizure involves stiffening of the body   A tonic-clonic seizure is a combination of clonic and tonic seizures  It is also called a grand mal seizure  You may lose consciousness, or your eyes may roll up and back into your head  You may also sweat all over your body  ¨ A myoclonic seizure  involves a sudden jerk of all or part of your body  ¨ An atonic seizure  is usually brief and causes a sudden loss of posture  You may fall suddenly to the ground  ¨ An absence seizure  is also known as a petit mal seizure  You will not be aware of your surroundings  You may stare blankly into space  You will not pay attention to anything happening around you  Your eyes may flutter or blink repeatedly, and you may smack your lips  You may have several absence seizures throughout a day  ¨ An atypical absence seizure  looks like an absence seizure, but with repetitive behaviors  Examples include eye opening and closing, eyes rolling outward or inward, and body stiffening  · A partial seizure  may affect one part of the brain  The symptoms may depend on where in the brain the abnormal activity is happening  It may be simple or complex  A simple partial seizure may not cause you to be less awake or alert  A complex partial seizure may cause you to be less awake or alert  Both types may cause jerky muscle movements, confusion, hallucinations, sweating, or repetitive behaviors  Call 911 or have someone else call for any of the following:   · Your seizure lasts longer than 5 minutes  · You have a second seizure that happens within 24 hours of your first     · You have trouble breathing after a seizure  · You cannot be woken after your seizure  · You have more than 1 seizure before you are fully awake or aware  Seek care immediately if:   · You are injured during a seizure  Contact your healthcare provider if:   · You have a fever  · You are planning to get pregnant or are currently pregnant      · You have questions or concerns about your condition or care   Treatment  will depend on the cause of your seizure  You may need medicine to prevent another seizure  Medicine may also be given to treat the cause of a seizure, such as antibiotics for an infection  What you can do to manage or prevent a seizure:   · Manage stress  Stress can trigger a seizure  Exercise can help you reduce stress  Talk to your healthcare provider about exercise that is safe for you  Other ways to manage stress include yoga, meditation, and biofeedback  Illness can be a form of stress  Eat a variety of healthy foods and drink plenty of liquids during an illness  · Set a regular sleep schedule  A lack of sleep can trigger a seizure  Try to go to sleep and wake up at the same times every day  Keep your bedroom quiet and dark  Talk to your healthcare provider if you are having trouble sleeping  · Manage other medical conditions  Manage other health conditions that may increase your risk for a seizure  Keep your blood sugar levels and blood pressure under control  · Limit or do not drink alcohol as directed  Alcohol can trigger a seizure, especially if you drink a large amount at one time  A drink of alcohol is 12 ounces of beer, 1½ ounces of liquor, or 5 ounces of wine  Talk to your healthcare provider about a safe amount of alcohol for you  Your provider may recommend that you do not drink any alcohol  Tell him or her if you need help to quit drinking  · Ask what safety precautions you should take  Talk with your healthcare provider about driving  You may not be able to drive until you are seizure-free for a period of time  You will need to check the law where you live  Also talk to your healthcare provider about swimming and bathing  You may drown or develop life-threatening heart or lung damage if you have a seizure in water  · Tell your friends, family members, and coworkers that you had a seizure    Give them written instructions to follow if you have another seizure  Follow up with your neurologist as directed: You may need more tests to find the cause of your seizure  Write down your questions so you remember to ask them during your visits  © 2017 [address] Information is for End User's use only and may not be sold, redistributed or otherwise used for commercial purposes  All illustrations and images included in CareNotes® are the copyrighted property of A [employer]  or Karthik Gómez  The above information is an  only  It is not intended as medical advice for individual conditions or treatments  Talk to your doctor, nurse or pharmacist before following any medical regimen to see if it is safe and effective for you

## 2019-10-28 NOTE — DISCHARGE SUMMARY
Discharge- Gunnar Bahena 1975, 44 y o  male MRN: [id number]    Unit/Bed#: ICU 06 Encounter: [id number]    Primary Care Provider: No primary care provider on file  Date and time admitted to hospital: 10/27/2019  7:15 PM      * Seizure disorder [facility])  Assessment & Plan  · Patient with no history of seizure disorder  · CT head/neck negative for any acute findings  · Neuro consult appreciated  · Patient will need outpatient EEG/MRI brain  · Follow up with Neurology as outpatient  · Case management set up home care for patient  · No need for antiepileptic drugs at this time per Neurology    Fall at home  Assessment & Plan  · PT recommends home care  · Patient be discharged with walker    Tobacco abuse  Assessment & Plan  · Education on importance of quitting  · Nicotine patch    Mentally challenged  Assessment & Plan  · At the patient's baseline        Discharging Physician / Practitioner: Elmer Meza MD  PCP: No primary care provider on file  Admission Date:   Admission Orders (From admission, onward)     Ordered        10/27/19 2101  Place in Observation (expected length of stay for this patient is less than two midnights)  Once                   Discharge Date: 10/28/19    Resolved Problems  Date Reviewed: 10/27/2019    None          Consultations During Hospital Stay:  · Neurology    Procedures Performed:   · None    Significant Findings / Test Results:   · Suspected seizure    Incidental Findings:   · None     Test Results Pending at Discharge (will require follow up): · None     Outpatient Tests Requested:  · MRI brain and EEG    Complications:  None    Reason for Admission:  Seizure    Hospital Course:     Gunnar Bahena is a 44 y o  male patient who originally presented to the hospital on 10/27/2019 due to seizure  Patient had suspected seizure prior to admission  Patient was admitted to the ICU for step-down observation  CT head and neck were negative for acute findings    Neurology was consulted and recommended outpatient EEG and MRI brain  No need for any antiepileptic drugs at this time  Patient stable for discharge home  Physical therapy evaluation appreciated and recommended home care with walker  PLEASE SEE PROGRESS NOTE FROM EARLIER TODAY REGARDING TODAY'S ENCOUNTER AND PHYSICAL EXAM DETAILS  Please see above list of diagnoses and related plan for additional information  Condition at Discharge: stable     Discharge Day Visit / Exam:     * Please refer to separate progress note for these details *    Discussion with Family:  Spoke to patient's brother at bedside regarding discharge planning  Discharge instructions/Information to patient and family:   See after visit summary for information provided to patient and family  Provisions for Follow-Up Care:  See after visit summary for information related to follow-up care and any pertinent home health orders  Disposition:  Home with VNA Services (Reminder: Complete face to face encounter)    For Discharges to [facility] SNF:   · Not Applicable to this Patient - Not Applicable to this Patient    Planned Readmission: no     Discharge Statement:  I spent 35 minutes discharging the patient  This time was spent on the day of discharge  I had direct contact with the patient on the day of discharge  Greater than 50% of the total time was spent examining patient, answering all patient questions, arranging and discussing plan of care with patient as well as directly providing post-discharge instructions  Additional time then spent on discharge activities  Discharge Medications:  See after visit summary for reconciled discharge medications provided to patient and family        ** Please Note: This note has been constructed using a voice recognition system **

## 2019-10-28 NOTE — UTILIZATION REVIEW
Initial Clinical Review    Admission: Date/Time/Statement: 10/27 @ 2101 OBSERVATION    Orders Placed This Encounter   Procedures    Place in Observation (expected length of stay for this patient is less than two midnights)     Standing Status:   Standing     Number of Occurrences:   1     Order Specific Question:   Admitting Physician     Answer:   Aida Friday [id number]     Order Specific Question:   Level of Care     Answer:   Level 2 Stepdown / HOT [14]     ED Arrival Information     Expected Arrival Acuity Means of Arrival Escorted By Service Admission Type    - 10/27/2019 19:12 Urgent Amb[facility] Urgent    Arrival Complaint    seizure        Chief Complaint   Patient presents with   [place] Fall     pt's family heard bang from pt's room, per ems family states that the pt "appeared to have a seizure " Pt only reports leg pain at this time  Assessment/Plan: 43 y/o male presents to ED from home by EMS s/p fall  Hx MR and unable to provide history  States sometimes his legs just feel weak and they give out  Patient believes he landed on his backside his gluteus area and complains of no pain at this time  He does complain of occasional intermittent bilateral leg weakness  Witnessed seizure like activity in ED last ing 15-20 seconds  Admitted as observation  Seizure disorder [facility])  Assessment & Plan  · Patient with fall at, heard by mother, when went to assess the patient was on the floor and shaking  · Family said it appears as if the patient had a seizure, he was not incontinent  · Brought into the emergency department by ambulance  · In the ED he had a shaking episode which included his arms and legs, his eyes were closed at that time  This lasted less than 30 seconds    · Provider was at bedside when shaking stopped  · When provider left the shaking began again, RN from ED stated in a loud voice stop , and the patient did stop  · He then opened his eyes and is able to speak and have a normal conversation with the RN  · Consult Neurology   · Accu-Cheks q a c  And HS  · Prolactin level  · EEG ordered  · CT of the head was negative  · Urine drug screen was negative  · Placed as a step-down patient  · Seizure precautions  · Neuro checks q 4 hours  · PT OT evaluation  · P r n  IV Ativan  · Check a TSH  · Give the patient oral thiamine, folic acid, multivitamin    Fall at home  Assessment & Plan  · Reported fall at home, unknown if patient did hit his head  · CT of the head was negative  · Check orthostatic blood pressures Q shift    ED Triage Vitals [10/27/19 1915]   Temperature Pulse Respirations Blood Pressure SpO2   98 7 °F (37 1 °C) 93 18 164/96 95 %      Temp Source Heart Rate Source Patient Position - Orthostatic VS BP Location FiO2 (%)   Temporal Monitor Sitting Left arm --      Pain Score       3        Wt Readings from Last 1 Encounters:   10/28/19 108 kg (236 lb 15 9 oz)     Additional Vital Signs:   10/28/19 0802    87  25Abnormal   142/84  105  96 %  None (Room air)  Standing - Orthostatic VS   10/28/19 0801    86  26Abnormal   145/78  106  97 %  None (Room air)  Sitting - Orthostatic VS   10/28/19 0800    64  25Abnormal   141/82  105  97 %  None (Room air)  Lying   10/28/19 0700  97 8 °F (36 6 °C)  63  18  127/77  97  96 %  None (Room air)  Sitting   10/28/19 0600  98 °F (36 7 °C)  66  23Abnormal   117/72  90  96 %       10/28/19 0500    63  19  110/62  82  95 %       10/28/19 0400    63  19  130/73  95  97 %       10/28/19 0300  98 °F (36 7 °C)  68  21  121/68  88  97 %       10/28/19 0200    69  21  137/75  100  97 %       10/28/19 0100    78  15  120/62  85  95 %       10/28/19 0000    76  23Abnormal   133/66  93  96 %       10/27/19 2300    72  24Abnormal   132/79  101  97 %       10/27/19 2200  98 4 °F (36 9 °C)  77  27Abnormal   136/87  106  96 %           Pertinent Labs/Diagnostic Test Results:   Results from last 7 days   Lab Units 10/28/19  0435 10/27/19  1928   WBC Thousand/uL 7 50 7 70   HEMOGLOBIN g/dL 14 5 14 2   HEMATOCRIT % 41 1* 42 1   PLATELETS Thousands/uL 333 321   NEUTROS ABS Thousands/µL 4 90 5 90         Results from last 7 days   Lab Units 10/28/19  0435 10/27/19  1928   SODIUM mmol/L 139 136   POTASSIUM mmol/L 3 7 3 5   CHLORIDE mmol/L 104 101   CO2 mmol/L 25 23   ANION GAP mmol/L 10 12   BUN mg/dL 11 10   CREATININE mg/dL 0 84 0 84   EGFR ml/min/1 73sq m 108 108   CALCIUM mg/dL 9 4 9 3     Results from last 7 days   Lab Units 10/28/19  0435 10/27/19  1928   AST U/L 21 22   ALT U/L 15 15   ALK PHOS U/L 158* 170*   TOTAL PROTEIN g/dL 6 8 7 3   ALBUMIN g/dL 4 0 4 2   TOTAL BILIRUBIN mg/dL 0 70 0 60     Results from last 7 days   Lab Units 10/28/19  0739 10/27/19  2300   POC GLUCOSE mg/dl 91 88     Results from last 7 days   Lab Units 10/28/19  0435 10/27/19  1928   GLUCOSE RANDOM mg/dL 101* 102*         Results from last 7 days   Lab Units 10/28/19  0435   HEMOGLOBIN A1C % 5 7   EAG mg/dl 117     No results found for: BETA-HYDROXYBUTYRATE                   Results from last 7 days   Lab Units 10/27/19  1928   TROPONIN I ng/mL <0 03             Results from last 7 days   Lab Units 10/28/19  0435   TSH 3RD GENERATON uIU/mL 3 950             Results from last 7 days   Lab Units 10/28/19  0435   PROLACTIN ng/mL 12 4                                 Results from last 7 days   Lab Units 10/27/19  2019   CLARITY UA  Clear   COLOR UA  Yellow   SPEC GRAV UA  <=1 005*   PH UA  5 5   GLUCOSE UA mg/dl Negative   KETONES UA mg/dl Negative   BLOOD UA  Negative   PROTEIN UA mg/dl Negative   NITRITE UA  Negative   BILIRUBIN UA  Negative   UROBILINOGEN UA E U /dl 0 2   LEUKOCYTES UA  Negative         Results from last 7 days   Lab Units 10/27/19  2019   AMPH/METH  Negative   BARBITURATE UR  Negative   BENZODIAZEPINE UR  Negative   COCAINE UR  Negative   METHADONE URINE  Negative   OPIATE UR  Negative   PCP UR  Negative   THC UR  Negative     EKG: Normal sinus rhythm with sinus arrhythmia  Minimal voltage criteria for LVH, may be normal variant  Inferior infarct , age undetermined  Cannot rule out Anterior infarct , age undetermined    Xray L spine:  Unremarkable examination  CT head:  No acute intracranial abnormality  ED Treatment:   Medication Administration from 10/27/2019 1912 to 10/27/2019 2152       Date/Time Order Dose Route Action Action by Comments     10/27/2019 1941 LORazepam (ATIVAN) 2 mg/mL injection 1 mg 1 mg Intravenous Given Leland Mcfarland RN         Past Medical History:   Diagnosis Date    Cleft hard palate      Present on Admission:   [facility]   Mentally challenged   Tobacco abuse      Admitting Diagnosis: Leg pain [M79 606]  Seizure disorder ([facility] [address] ) [G40 909]  Mentally challenged [F79]  Age/Sex: 44 y o  male  Admission Orders:    Medications:  folic acid 1 mg Oral Daily   influenza vaccine 0 5 mL Intramuscular Once   insulin lispro 1-5 Units Subcutaneous TID AC   insulin lispro 1-5 Units Subcutaneous HS   multivitamin-minerals 1 tablet Oral Daily   nicotine 1 patch Transdermal Daily   thiamine 100 mg Oral Daily          acetaminophen 650 mg Oral Q6H PRN   LORazepam 1 mg Intramuscular Q8H PRN   ondansetron 4 mg Intravenous Q6H PRN       IP CONSULT TO CASE MANAGEMENT  IP CONSULT TO NEUROLOGY    Network Utilization Review Department  [email or web address]  ATTENTION: Please call with any questions or concerns to [phone] and carefully listen to the prompts so that you are directed to the right person  All voicemails are confidential   [place] all requests for admission clinical reviews, approved or denied determinations and any other requests to dedicated fax number below belonging to the campus where the patient is receiving treatment    FACILITY NAME UR FAX NUMBER   ADMISSION DENIALS (Administrative/Medical Necessity) [phone]   PAR[facility] (Maternity/NICU/Pediatrics) [place] [address] [phone]   [place] [phone]   [address]  [place] [address] [facility] [phone]   [facility] [address] [address] [phone]

## 2019-10-28 NOTE — PHYSICAL THERAPY NOTE
Physical Therapy Evaluation     Patient's Name: Ashley Fontana    Admitting Diagnosis  Leg pain [M79 606]  Seizure disorder ([facility] ) [L84 452]  Mentally challenged Alis Art    Problem List  Patient Active Problem List   Diagnosis    [facility]ure disorder ([facility] )    Mentally challenged    Tobacco abuse    Fall at home       Past Medical History  Past Medical History:   Diagnosis Date    Cleft hard palate        Past Surgical History  History reviewed  No pertinent surgical history  10/28/19 0803   Note Type   Note type Eval/Treat   Pain Assessment   Pain Assessment 0-10   Pain Score 8   Pain Type Acute pain   Pain Location Leg   Pain Orientation Bilateral   Pain Descriptors Aching   Pain Frequency Constant/continuous   Pain Onset Ongoing   Clinical Progression Not changed   Hospital Pain Intervention(s) Medication (See MAR); Repositioned; Ambulation/increased activity   Home Living   Type of [address] [place] [address] Two level  (full flight to bedroom)   Bathroom Shower/Tub Tub/shower unit   Bathroom Toilet Standard   Bathroom Accessibility Accessible   Home Equipment   (Pt  does not have or utilize an AD at baseline )   Prior Function   Level of [place] Independent with ADLs and functional mobility; Needs assistance with IADLs   Lives With Family   Receives Help From Family   ADL Assistance Independent   IADLs Needs assistance   Falls in the last 6 months 1 to 4   Restrictions/Precautions   Weight Bearing Precautions Per Order No   Other Precautions Telemetry; Fall Risk;Multiple lines;Pain   General   Family/Caregiver Present No   Cognition   Overall Cognitive Status WFL   Arousal/Participation Alert   Attention Within functional limits   Orientation Level Oriented X4   Memory Within functional limits   Following Commands Follows one step commands without difficulty   RUE Assessment   RUE Assessment WFL   LUE Assessment   LUE Assessment WFL   RLE Assessment   RLE Assessment X   Strength RLE   R Hip Flexion 3+/5   R Knee Extension 3+/5   R Ankle Dorsiflexion 3+/5   LLE Assessment   LLE Assessment X   Strength LLE   L Hip Flexion 3+/5   L Knee Extension 3+/5   L Ankle Dorsiflexion 3+/5   Coordination   Movements are Fluid and Coordinated 1   Light Touch   RLE Light Touch Grossly intact   LLE Light Touch Grossly intact   Sharp/Dull   RLE Sharp/Dull Grossly intact   LLE Sharp/Dull Grossly intact   Bed Mobility   Supine to Sit 5  Supervision   Additional items Assist x 1;HOB elevated; Bedrails;Verbal cues   Sit to Supine 5  Supervision   Additional items Assist x 1;HOB elevated; Bedrails;Verbal cues   Transfers   Sit to Stand 4  Minimal assistance   Additional items Assist x 1;Bedrails;Verbal cues; Impulsive   Stand to Sit 4  Minimal assistance   Additional items Assist x 1;Verbal cues; Bedrails   Stand pivot 4  Minimal assistance   Additional items Assist x 1; Impulsive;Verbal cues   Ambulation/Elevation   Gait pattern Improper Weight shift;Narrow ROSA; Forward Flexion;Decreased foot clearance; Short stride   Gait Assistance   (CGA)   Additional items Assist x 1;Verbal cues; Tactile cues   Assistive Device Rolling walker   Distance 50 feet x 2   Stair Management Assistance Not tested   Balance   Static Sitting Good   Dynamic Sitting Fair +   Static Standing Fair   Dynamic Standing Fair -   Ambulatory Fair -   Activity Tolerance   Activity Tolerance Patient limited by pain   Nurse Made Aware yes, BODØ RN observed PT assessment and assisted prn  Assessment   Prognosis Good   Problem List Decreased strength;Decreased endurance;Decreased mobility; Impaired balance;Decreased cognition; Impaired judgement;Decreased safety awareness;Pain   Assessment Pt is 44 y o  male seen for PT evaluation s/p admit to [address] on 10/27/2019 w/ Seizure disorder ([address] )  PT consulted to assess pt's functional mobility and d/c needs  Order placed for PT eval and tx, w/ activity as tolerated order   Comorbidities affecting pt's physical performance at time of assessment include: weakness, seizure d/o, mentally challenged, tobacco abuse, fall  PTA, pt was independent w/ all functional mobility w/ o AD  Personal factors affecting pt at time of IE include: ambulating w/ assistive device, inability to navigate community distances, inability to navigate level surfaces w/o external assistance, unable to perform dynamic tasks in community, positive fall history and limited insight into impairments  Please find objective findings from PT assessment regarding body systems outlined above with impairments and limitations including weakness, impaired balance, decreased endurance, gait deviations, decreased activity tolerance, decreased functional mobility tolerance, decreased safety awareness, impaired judgement and fall risk  The following objective measures performed on IE also reveal limitations: Barthel Index: 60/100  Pt's clinical presentation is currently unstable/unpredictable  Pt to benefit from continued PT tx to address deficits as defined above and maximize level of functional independent mobility and consistency  From PT/mobility standpoint, recommendation at time of d/c would be Home PT with family support and rolling walker pending progress in order to facilitate return to PLOF  Goals   Patient Goals feel better soon and go home   LTG Expiration Date 11/07/19   Long Term Goal #1 1 )Patient will complete bed mobility independently  for decrease need for caregiver assistance, decrease burden of care  2 ) Patient will complete transfers modified I to decrease risk of falls, facilitate upright standing posture  3 ) BLE strength to greater than/equal to 4-/5 gross musculature to increase ability to safely transfer, control descent to chair  4 ) Patient will exhibit increase dynamic standing balance to Good, for 3-5 minutes  without LOB modified I  to improve activity tolerance & endurance   5 ) Patient will exhibit increase dynamic ambulatory balance to Fair+ for 200-300 feet w/RW modified I  to improve ability to mobilize to toilet, chair and decrease risk for additional medical complications  6 ) Patient will exhibit good self monitoring and ability to follow 2 step commands to increase complexity of tasks and resume ADL's without LOB  PT Treatment Day 1   Plan   Treatment/Interventions Functional transfer training;LE strengthening/ROM; Therapeutic exercise; Endurance training;Equipment eval/education; Bed mobility;Gait training;Spoke to nursing;OT   PT Frequency Other (Comment)  (3-5x/wk)   Recommendation   Recommendation Home PT; Home with family support   Equipment Recommended Walker  (rolling, does not have at home)   PT - OK to Discharge Yes  (if medically stable)   Additional Comments Upon conclusion, pt  was resting in bed w/nursing staff at bedside  Barthel Index   Feeding 10   Bathing 0   Grooming Score 5   Dressing Score 5   Bladder Score 5   Bowels Score 10   Toilet Use Score 5   Transfers (Bed/Chair) Score 10   Mobility (Level Surface) Score 10   Stairs Score 0   Barthel Index Score 60     Additional skilled interventions:Gait Training x 10 minutes    S: Patient agreeable and eager to participate in gait training, 8/10 B leg pain prior to mobilization;A&Ox4  O:Patient ambulated 50 feet x 2 with RW, CGA of 1 with verbal cues on proper RW usage, maintaining position with directional changes, and safety awareness  Tactile cues were also provided to maintain safety throughout session, due to intermittent balance perturbations with external tactile correcting  A:Patient responded well to provided education and demonstrated increased acuity with RW usage upon conclusion  P:Gait training will be continued with progressed distances as patient can safely tolerate, treatment sessions 3-5 x/wk        Munir Duffy, PT

## 2019-10-28 NOTE — ASSESSMENT & PLAN NOTE
· Reported fall at home, unknown if patient did hit his head  · CT of the head was negative  · Check orthostatic blood pressures Q shift

## 2019-10-28 NOTE — PLAN OF CARE
Problem: OCCUPATIONAL THERAPY ADULT  Goal: Performs self-care activities at highest level of function for planned discharge setting  See evaluation for individualized goals  Description  Treatment Interventions: ADL retraining, Functional transfer training, UE strengthening/ROM, Endurance training, Compensatory technique education          See flowsheet documentation for full assessment, interventions and recommendations  Note:   Limitation: Decreased ADL status, Decreased Safe judgement during ADL, Decreased endurance, Decreased self-care trans, Decreased high-level ADLs  Prognosis: Good  Assessment: Pt is a 44 y o  male who was admitted to [address] [place] [address] on 10/27/2019 with Seizure disorder ([facility] )  At this time, patient is also affected by the comorbidities of: mentally challenged, tobacco abuse and fall at home  Additionally, patient is affected by the following personal factors:steps to enter environment, difficulty performing ADLS and difficulty performing IADLS   Orders placed for OT evaluation/treatment with up with assistance orders  Prior to admission, Patient reporting being independent with ADLs, ambulatory with no AD, and lives with family in a two story house  Upon OT evaluation, patient requires supervision/set-up for UB ADLs and supervision/set-up for LB ADLs  Occupational performance is affected by the following deficits: weakness, decreased balance, decreased tolerance, impaired sequencing, impaired problem solving and increased pain  Based on the following information, patient would benefit from continued skilled OT treatment while in the hospital to address deficits and maximize level of functional independence with ADL's and functional mobility  Occupational Performance areas to address include: grooming, bathing/shower, toilet hygiene, dressing, functional mobility and clothing management   From OT standpoint, recommendation at time of d/c would be home OT vs home with family support pending progress       OT Discharge Recommendation: Home OT vs Home with family support pending progress   OT - OK to Discharge: Yes(Once medically cleared)     Doris Muro, OT

## 2019-10-28 NOTE — ASSESSMENT & PLAN NOTE
· Patient with no history of seizure disorder  · Will continue neuro checks and seizure precautions  · Neuro consult pending  · CT head/neck negative for any acute findings  · Appears to be at baseline today  · No further seizure-like activity noted  · No sign of infection at this time and electrolytes within normal limits

## 2019-10-28 NOTE — ASSESSMENT & PLAN NOTE
· Reported fall at home, unknown if patient did hit his head  · CT of the head/neck negative  · Check orthostatic blood pressures Q shift  · PT/OT eval

## 2019-10-28 NOTE — ASSESSMENT & PLAN NOTE
· Patient with no history of seizure disorder  · CT head/neck negative for any acute findings  · Neuro consult appreciated  · Patient will need outpatient EEG/MRI brain  · Follow up with Neurology as outpatient  · Case management set up home care for patient  · No need for antiepileptic drugs at this time per Neurology

## 2019-10-28 NOTE — ASSESSMENT & PLAN NOTE
· Patient with fall at, heard by mother, when went to assess the patient was on the floor and shaking  · Family said it appears as if the patient had a seizure, he was not incontinent  · Brought into the emergency department by ambulance  · In the ED he had a shaking episode which included his arms and legs, his eyes were closed at that time  This lasted less than 30 seconds  · Provider was at bedside when shaking stopped  · When provider left the shaking began again, RN from ED stated in a loud voice stop , and the patient did stop  · He then opened his eyes and is able to speak and have a normal conversation with the RN  · Consult Neurology   · Accu-Cheks q a c  And HS  · Prolactin level  · EEG ordered  · CT of the head was negative  · Urine drug screen was negative  · Placed as a step-down patient  · Seizure precautions  · Neuro checks q 4 hours  · PT OT evaluation  · P r n  IV Ativan  · Check a TSH  · Give the patient oral thiamine, folic acid, multivitamin

## 2019-10-28 NOTE — TELEMEDICINE
TeleConsultation - Neurology   Kaycee Huff 44 y o  male MRN: [id number]   Encounter: [id number]      REQUIRED DOCUMENTATION:     1  This service was provided via Telemedicine  2  Provider located at home  3  TeleMed provider: Raymon Delacruz MD   4  Identify all parties in room with patient during tele consult:  Hipolito Weiss RN   5  After connecting through [employer], patient was identified by name and date of birth and assistant checked wristband  Patient was then informed that this was a Telemedicine visit and that the exam was being conducted confidentially over secure lines  My office door was closed  No one else was in the room  Patient acknowledged consent and understanding of privacy and security of the Telemedicine visit, and gave us permission to have the assistant stay in the room in order to assist with the history and to conduct the exam   I informed the patient that I have reviewed their record in Epic and presented the opportunity for them to ask any questions regarding the visit today  The patient agreed to participate  Assessment/Plan   Assessment:  A convulsive event:  Unclear if it is epileptic  Description from staff below is suggestive of non-epileptic event  We will hold off on starting AED yet  We suggest obtaining EEG and MRI brain  The latter can be outpatient  If abnormal, then we will start AED  Otherwise, we will wait to see if these spells recur  The patient should follow seizure precautions until the episode is diagnosed or until 12 months after the last spell  History of Present Illness     Reason for Consult / Principal Problem: Seizure  Hx and PE limited by: developmental delay  HPI: Kaycee Huff is a 44 y o  male who presents after being witnessed to have a seizure  History is limited; but brother stated that they heard a thud, then found the patient shaking his extremities on the floor  It lasted few minutes and then he woke up and appeared back to baseline  Upon arrival to ED, patient was witnessed to be shaking again, but it was distractible as it vanished when MD was at bedside  The nurse documented that she stated "stop" and the patient responded to her by stopping his convulsing  Patient has a family history of seizure in the father as per him, but no clear details  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  Inpatient consult to Neurology  Consult performed by: Edi Loredo MD  Consult ordered by: CHAZ Caba          Review of Systems  Complete ROS was done and is negative other than what is mentioned in HPI    Historical Information   Past Medical History:   Diagnosis Date    Cleft hard palate      History reviewed  No pertinent surgical history  Social History   Social History     Substance and Sexual Activity   Alcohol Use Not Currently     Social History     Substance and Sexual Activity   Drug Use Never     Social History     Tobacco Use   Smoking Status Current Every Day Smoker    Packs/day: 0 50   Smokeless Tobacco Never Used     Family History: non-contributory    Review of previous medical records was completed  Meds/Allergies   all current active meds have been reviewed    No Known Allergies    Objective   Vitals:Blood pressure 142/84, pulse 87, temperature 97 8 °F (36 6 °C), temperature source Temporal, resp  rate (!) 25, height 6' 2" (1 88 m), weight 108 kg (236 lb 15 9 oz), SpO2 96 %  ,Body mass index is 30 43 kg/m²  No intake or output data in the 24 hours ending 10/28/19 1049    Invasive Devices: Invasive Devices     Peripheral Intravenous Line            Peripheral IV 10/27/19 Dorsal (posterior); Left Hand less than 1 day                Physical Exam NAD  Skin: no seen lesions  HEENT: ATNC  Chest: breathing comfortably on room air  GI: no apparent distension  Neurologic Exam   Alert and oriented  Psychomotor retardation  Poor articulation  Face is symmetric   Able to move all extremities against gravity, but stated he has pain in right hip  Lab Results:   CBC:   Results from last 7 days   Lab Units 10/28/19  0435 10/27/19  1928   WBC Thousand/uL 7 50 7 70   RBC Million/uL 4 62 4 66   HEMOGLOBIN g/dL 14 5 14 2   HEMATOCRIT % 41 1* 42 1   MCV fL 89 90   PLATELETS Thousands/uL 333 321   , BMP/CMP:   Results from last 7 days   Lab Units 10/28/19  0435 10/27/19  1928   SODIUM mmol/L 139 136   POTASSIUM mmol/L 3 7 3 5   CHLORIDE mmol/L 104 101   CO2 mmol/L 25 23   BUN mg/dL 11 10   CREATININE mg/dL 0 84 0 84   CALCIUM mg/dL 9 4 9 3   AST U/L 21 22   ALT U/L 15 15   ALK PHOS U/L 158* 170*   EGFR ml/min/1 73sq m 108 108   , Drug Screen:   Results from last 7 days   Lab Units 10/27/19  2019   BARBITURATE UR  Negative   BENZODIAZEPINE UR  Negative   THC UR  Negative   COCAINE UR  Negative   METHADONE URINE  Negative   OPIATE UR  Negative   PCP UR  Negative     Imaging Studies: I have personally reviewed pertinent reports  EKG, Pathology, and Other Studies: I have personally reviewed pertinent reports

## 2019-10-28 NOTE — H&P
H&P- Amadou Mitchell 1975, 44 y o  male MRN: [id number]    Unit/Bed#: ED 03 Encounter: [id number]    Primary Care Provider: No primary care provider on file  Date and time admitted to hospital: 10/27/2019  7:15 PM        * Seizure disorder [facility])  Assessment & Plan  · Patient with fall at, heard by mother, when went to assess the patient was on the floor and shaking  · Family said it appears as if the patient had a seizure, he was not incontinent  · Brought into the emergency department by ambulance  · In the ED he had a shaking episode which included his arms and legs, his eyes were closed at that time  This lasted less than 30 seconds  · Provider was at bedside when shaking stopped  · When provider left the shaking began again, RN from ED stated in a loud voice stop , and the patient did stop  · He then opened his eyes and is able to speak and have a normal conversation with the RN  · Consult Neurology   · Accu-Cheks q a c  And HS  · Prolactin level  · EEG ordered  · CT of the head was negative  · Urine drug screen was negative  · Placed as a step-down patient  · Seizure precautions  · Neuro checks q 4 hours  · PT OT evaluation  · P r n  IV Ativan  · Check a TSH  · Give the patient oral thiamine, folic acid, multivitamin  Mentally challenged  Assessment & Plan  · At the patient's baseline    Tobacco abuse  Assessment & Plan  · Education on importance of quitting  · Nicotine patch    Fall at home  Assessment & Plan  · Reported fall at home, unknown if patient did hit his head  · CT of the head was negative  · Check orthostatic blood pressures Q shift    VTE Prophylaxis: Will ambulate patient  Code Status:  Full code  POLST: POLST is not applicable to this patient  Discussion with family:  Discussed with patient's family    Anticipated Length of Stay:  Patient will be admitted on an Observation basis with an anticipated length of stay of  < 2 midnights     Justification for Hospital Stay: Observation monitoring for seizure-like activity, consult with Neurology    Chief Complaint:   Fall with possible seizure    History of Present Illness:    Breanna Hartley is a 44 y o  male who presents with fall with possible seizure  Patient's mother states that she heard a Elayne Dux from upstairs in the patient's bedroom  When she arrived in his room he was on the floor and appeared to be having a seizure  He was not incontinent at that time  911 was called and the patient was brought to the emergency department by ambulance  In the emergency department he was awake and alert  He then had a shaking episode which included his arms and legs, his eyes were closed at that time, he was not incontinent  This episode lasted less than 30 seconds  The provider was at the bedside and ordered Ativan 1 mg IV  This was administered  The patient then started to shake his arms and legs again, and the RN in the emergency department stated loudly stop and the patient did  The patient then opened his eyes and was able to speak to the RN without any issues  When speaking to the patient's mother, she states that she wonders if he just did not have himself so worked up that he was shaking out of fear  I explained to her that we will continue to monitor him overnight and assured her that if anything does happen he will be taking care of  Review of Systems:  Review of Systems   All other systems reviewed and are negative  Past Medical and Surgical History:   Past Medical History:   Diagnosis Date    Cleft hard palate        History reviewed  No pertinent surgical history  Meds/Allergies:  Prior to Admission medications    Not on File     I have reviewed home medications with patient family member      Allergies: No Known Allergies    Social History:  Marital Status: Single   Occupation:  Disabled  Patient Pre-hospital Living Situation:  At home with his mother  Patient Pre-hospital Level of Mobility: Functioning  Patient Pre-hospital Diet Restrictions:  None  Substance Use History:   Social History     Substance and Sexual Activity   Alcohol Use Not Currently     Social History     Tobacco Use   Smoking Status Current Every Day Smoker    Packs/day: 0 50   Smokeless Tobacco Never Used     Social History     Substance and Sexual Activity   Drug Use Never       Family History:  I have reviewed the patients family history    Physical Exam:   Vitals:   Blood Pressure: 164/96 (10/27/19 1915)  Pulse: 93 (10/27/19 1915)  Temperature: 98 7 °F (37 1 °C) (10/27/19 1915)  Temp Source: Temporal (10/27/19 1915)  Respirations: 18 (10/27/19 1915)  Height: 6' 2" (188 cm) (10/27/19 1915)  Weight - Scale: 81 6 kg (180 lb) (10/27/19 1915)  SpO2: 95 % (10/27/19 1915)    Physical Exam   Constitutional: Vital signs are normal  He appears well-developed and well-nourished  He is cooperative  HENT:   Head: Normocephalic and atraumatic  Nose: Nose normal    Mouth/Throat: Oropharynx is clear and moist and mucous membranes are normal    Eyes: Conjunctivae and EOM are normal    Neck: Full passive range of motion without pain  Cardiovascular: Normal rate, regular rhythm, normal heart sounds and normal pulses  Pulmonary/Chest: Effort normal and breath sounds normal    Abdominal: Soft  Normal appearance and bowel sounds are normal    Musculoskeletal: Normal range of motion  Neurological: He is alert  Oriented x2, patient does have a history of mental disability  Skin: Skin is warm  Psychiatric: He has a normal mood and affect  His speech is normal and behavior is normal    Patient's mother states that he can get very anxious, she believes that there is a possibility that he got nervous after falling and the shaking was due to anxiety  We will continue to monitor the patient  Additional Data:   Lab Results: I have personally reviewed pertinent reports      Results from last 7 days   Lab Units 10/27/19  1928   WBC Thousand/uL 7 70   HEMOGLOBIN g/dL 14 2   HEMATOCRIT % 42 1   PLATELETS Thousands/uL 321   NEUTROS PCT % 76*   LYMPHS PCT % 15*   MONOS PCT % 8   EOS PCT % 1     Results from last 7 days   Lab Units 10/27/19  1928   POTASSIUM mmol/L 3 5   CHLORIDE mmol/L 101   CO2 mmol/L 23   BUN mg/dL 10   CREATININE mg/dL 0 84   CALCIUM mg/dL 9 3   ALK PHOS U/L 170*   ALT U/L 15   AST U/L 22                   Imaging: I have personally reviewed pertinent reports  XR spine lumbar 2 or 3 views injury   ED Interpretation by Erica Garcia MD (10/27 2017)   X-RAY LS SPINE SHOWS NORMAL DISC SPACE HEIGHT NO COMPRESSION NO EVIDENCE OF LS FRACTURE  CT head without contrast   ED Interpretation by Erica Garcia MD (10/27 2052)      No acute intracranial abnormality  Workstation performed: [id number]         Final Result by Lacie Hector MD (10/27 2029)      No acute intracranial abnormality  Workstation performed: [id number]             EKG, Pathology, and Other Studies Reviewed on Admission:   · EKG:  Normal sinus rhythm, heart rate 72    [facility] No     ** Please Note: This note has been constructed using a voice recognition system   **

## 2019-10-28 NOTE — SOCIAL WORK
CM met with pt at bedside and explained role  Pt lives with his mother Luisito Kendall and brother Salomón Garcia in a 2 story house; 12 steps inside, 3 [place]  Pt denies the use of any DME at home  Pt is independent with ADLs at home  His family provides transport  Pt PCP is Dr Lyric Matamoros per family  Pt denies having a pharmacy preference  Pt has a history of developmental delay although no specific diagnosis is known  A history of HHC, STR and D&A treatment was denied  HHC recommended by PT after evaluation  CM discussed same with pt and niecy  A post acute care recommendation was made by your care team for [facility]  Discussed [place] of Choice with patient  List of agencies given to patient via in person  patient aware the list is custom filtered for them by preference  and that [facility] post acute providers are designated  [facility]  Referral made and able to accept  Pt aware and given brochure for same  PT also recommndeds RW  CM obtained script for same form Dr Luca Cochran  CM provided pt with RW and for and script were left in bin for Jose  Family will transport him  NO further needs identified  Pt for dischagre home today  CM reviewed d/c planning process including the following: identifying help at home, patient preference for d/c planning needs, availability of treatment team to discuss questions or concerns patient and/or family may have regarding understanding medications and recognizing signs and symptoms once discharged  CM also encouraged patient to follow up with all recommended appointments after discharge  Patient advised of importance for patient and family to participate in managing patients medical well being

## 2019-10-28 NOTE — PLAN OF CARE
Problem: PAIN - ADULT  Goal: Verbalizes/displays adequate comfort level or baseline comfort level  Description  Interventions:  - Encourage patient to monitor pain and request assistance  - Assess pain using appropriate pain scale  - Administer analgesics based on type and severity of pain and evaluate response  - Implement non-pharmacological measures as appropriate and evaluate response  - Consider cultural and social influences on pain and pain management  - Notify physician/advanced practitioner if interventions unsuccessful or patient reports new pain  Outcome: Progressing     Problem: INFECTION - ADULT  Goal: Absence or prevention of progression during hospitalization  Description  INTERVENTIONS:  - Assess and monitor for signs and symptoms of infection  - Monitor lab/diagnostic results  - Monitor all insertion sites, i e  indwelling lines, tubes, and drains  - Monitor endotracheal if appropriate and nasal secretions for changes in amount and color  - [facility] appropriate cooling/warming therapies per order  - Administer medications as ordered  - Instruct and encourage patient and family to use good hand hygiene technique  - Identify and instruct in appropriate isolation precautions for identified infection/condition  Outcome: Progressing  Goal: Absence of fever/infection during neutropenic period  Description  INTERVENTIONS:  - Monitor WBC    Outcome: Progressing     Problem: SAFETY ADULT  Goal: Patient will remain free of falls  Description  INTERVENTIONS:  - Assess patient frequently for physical needs  -  Identify cognitive and physical deficits and behaviors that affect risk of falls    -  [facility] fall precautions as indicated by assessment   - Educate patient/family on patient safety including physical limitations  - Instruct patient to call for assistance with activity based on assessment  - Modify environment to reduce risk of injury  - Consider OT/PT consult to assist with strengthening/mobility  Outcome: Progressing  Goal: Maintain or return to baseline ADL function  Description  INTERVENTIONS:  -  Assess patient's ability to carry out ADLs; assess patient's baseline for ADL function and identify physical deficits which impact ability to perform ADLs (bathing, care of mouth/teeth, toileting, grooming, dressing, etc )  - Assess/evaluate cause of self-care deficits   - Assess range of motion  - Assess patient's mobility; develop plan if impaired  - Assess patient's need for assistive devices and provide as appropriate  - Encourage maximum independence but intervene and supervise when necessary  - Involve family in performance of ADLs  - Assess for home care needs following discharge   - Consider OT consult to assist with ADL evaluation and planning for discharge  - Provide patient education as appropriate  Outcome: Progressing  Goal: Maintain or return mobility status to optimal level  Description  INTERVENTIONS:  - Assess patient's baseline mobility status (ambulation, transfers, stairs, etc )    - Identify cognitive and physical deficits and behaviors that affect mobility  - Identify mobility aids required to assist with transfers and/or ambulation (gait belt, sit-to-stand, lift, walker, cane, etc )  - Hall fall precautions as indicated by assessment  - Record patient progress and toleration of activity level on Mobility SBAR; progress patient to next Phase/Stage  - Instruct patient to call for assistance with activity based on assessment  - Consider rehabilitation consult to assist with strengthening/weightbearing, etc   Outcome: Progressing     Problem: DISCHARGE PLANNING  Goal: Discharge to home or other facility with appropriate resources  Description  INTERVENTIONS:  - Identify barriers to discharge w/patient and caregiver  - Arrange for needed discharge resources and transportation as appropriate  - Identify discharge learning needs (meds, wound care, etc )  - Arrange for interpretive services to assist at discharge as needed  - Refer to Case Management Department for coordinating discharge planning if the patient needs post-hospital services based on physician/advanced practitioner order or complex needs related to functional status, cognitive ability, or social support system  Outcome: Progressing     Problem: Knowledge Deficit  Goal: Patient/family/caregiver demonstrates understanding of disease process, treatment plan, medications, and discharge instructions  Description  Complete learning assessment and assess knowledge base    Interventions:  - Provide teaching at level of understanding  - Provide teaching via preferred learning methods  Outcome: Progressing

## 2019-10-29 ENCOUNTER — APPOINTMENT (EMERGENCY)
Dept: RADIOLOGY | Facility: HOSPITAL | Age: 44
End: 2019-10-29
Payer: MEDICARE

## 2019-10-29 ENCOUNTER — HOSPITAL ENCOUNTER (EMERGENCY)
Facility: HOSPITAL | Age: 44
Discharge: HOME | End: 2019-10-29
Attending: EMERGENCY MEDICINE
Payer: MEDICARE

## 2019-10-29 VITALS
BODY MASS INDEX: 32.93 KG/M2 | WEIGHT: 230 LBS | HEIGHT: 70 IN | TEMPERATURE: 98.8 F | HEART RATE: 84 BPM | RESPIRATION RATE: 16 BRPM | DIASTOLIC BLOOD PRESSURE: 76 MMHG | SYSTOLIC BLOOD PRESSURE: 122 MMHG | OXYGEN SATURATION: 98 %

## 2019-10-29 DIAGNOSIS — R25.1 SHAKING: Primary | ICD-10-CM

## 2019-10-29 DIAGNOSIS — R56.9 SEIZURE-LIKE ACTIVITY (HCC): ICD-10-CM

## 2019-10-29 LAB
ANION GAP SERPL CALCULATED.3IONS-SCNC: 9 MMOL/L (ref 4–13)
BASOPHILS # BLD AUTO: 0 THOUSANDS/ΜL (ref 0–0.1)
BASOPHILS NFR BLD AUTO: 1 % (ref 0–2)
BILIRUB UR QL STRIP: NEGATIVE
BUN SERPL-MCNC: 10 MG/DL (ref 7–25)
CALCIUM SERPL-MCNC: 9.4 MG/DL (ref 8.6–10.5)
CHLORIDE SERPL-SCNC: 104 MMOL/L (ref 98–107)
CLARITY UR: CLEAR
CO2 SERPL-SCNC: 24 MMOL/L (ref 21–31)
COLOR UR: YELLOW
CREAT SERPL-MCNC: 0.85 MG/DL (ref 0.7–1.3)
EOSINOPHIL # BLD AUTO: 0.1 THOUSAND/ΜL (ref 0–0.61)
EOSINOPHIL NFR BLD AUTO: 2 % (ref 0–5)
ERYTHROCYTE [DISTWIDTH] IN BLOOD BY AUTOMATED COUNT: 14.5 % (ref 11.5–14.5)
GFR SERPL CREATININE-BSD FRML MDRD: 107 ML/MIN/1.73SQ M
GLUCOSE SERPL-MCNC: 107 MG/DL (ref 65–99)
GLUCOSE UR STRIP-MCNC: NEGATIVE MG/DL
HCT VFR BLD AUTO: 43 % (ref 42–47)
HGB BLD-MCNC: 14.6 G/DL (ref 14–18)
HGB UR QL STRIP.AUTO: NEGATIVE
KETONES UR STRIP-MCNC: NEGATIVE MG/DL
LEUKOCYTE ESTERASE UR QL STRIP: NEGATIVE
LYMPHOCYTES # BLD AUTO: 1.6 THOUSANDS/ΜL (ref 0.6–4.47)
LYMPHOCYTES NFR BLD AUTO: 26 % (ref 21–51)
MCH RBC QN AUTO: 30.3 PG (ref 26–34)
MCHC RBC AUTO-ENTMCNC: 34 G/DL (ref 31–37)
MCV RBC AUTO: 89 FL (ref 81–99)
MONOCYTES # BLD AUTO: 0.7 THOUSAND/ΜL (ref 0.17–1.22)
MONOCYTES NFR BLD AUTO: 11 % (ref 2–12)
NEUTROPHILS # BLD AUTO: 3.8 THOUSANDS/ΜL (ref 1.4–6.5)
NEUTS SEG NFR BLD AUTO: 60 % (ref 42–75)
NITRITE UR QL STRIP: NEGATIVE
PH UR STRIP.AUTO: 5 [PH]
PLATELET # BLD AUTO: 356 THOUSANDS/UL (ref 149–390)
PMV BLD AUTO: 8.3 FL (ref 8.6–11.7)
POTASSIUM SERPL-SCNC: 3.7 MMOL/L (ref 3.5–5.5)
PROT UR STRIP-MCNC: NEGATIVE MG/DL
RBC # BLD AUTO: 4.81 MILLION/UL (ref 4.3–5.9)
SODIUM SERPL-SCNC: 137 MMOL/L (ref 134–143)
SP GR UR STRIP.AUTO: 1.01 (ref 1–1.03)
UROBILINOGEN UR QL STRIP.AUTO: 0.2 E.U./DL
WBC # BLD AUTO: 6.3 THOUSAND/UL (ref 4.8–10.8)

## 2019-10-29 PROCEDURE — 99284 EMERGENCY DEPT VISIT MOD MDM: CPT

## 2019-10-29 PROCEDURE — 80048 BASIC METABOLIC PNL TOTAL CA: CPT | Performed by: INTERNAL MEDICINE

## 2019-10-29 PROCEDURE — 73590 X-RAY EXAM OF LOWER LEG: CPT

## 2019-10-29 PROCEDURE — 36415 COLL VENOUS BLD VENIPUNCTURE: CPT | Performed by: INTERNAL MEDICINE

## 2019-10-29 PROCEDURE — 85025 COMPLETE CBC W/AUTO DIFF WBC: CPT | Performed by: INTERNAL MEDICINE

## 2019-10-29 PROCEDURE — 81003 URINALYSIS AUTO W/O SCOPE: CPT | Performed by: INTERNAL MEDICINE

## 2019-10-30 NOTE — ED PROVIDER NOTES
History  Chief Complaint   Patient presents with    Seizure Re-Evaluation     " felt my self shaking and fell oout of bed"     71-year-old white male mental retardation presents after being found on the floor  He apparently had gone to bed, and thinks he had a seizure  Margie himself shaking  No recollection of the event  There is no apparent postictal phase  He denies any loss of urine or stool  Denies any significant trauma  Seen here 2 days ago admitted for 24 hour observation with similar presentation  He states his father, who is passed, had seizures as well  During his hospital stay his prolactin level is negative, glucose levels were followed and were normal, CT of the head lumbar spine within normal limits  While in the ER he had another seizure or is hands were flailing, he was shaking at the waist, rolled his eyes backwards but immediately woke up  In asking whether he was upset at something, he began to describe the situation at his home readings to walk up steps single around the building  This clearly brings on some emotional feelings  This this seizure occurred shortly after his mother arrived and I told him the situation the potential for outpatient workup upon discharge from the ER  None       Past Medical History:   Diagnosis Date    Cleft hard palate     Seizures ([address] )        History reviewed  No pertinent surgical history  History reviewed  No pertinent family history  I have reviewed and agree with the history as documented  Social History     Tobacco Use    Smoking status: Current Every Day Smoker     Packs/day: 0 50    Smokeless tobacco: Never Used   Substance Use Topics    Alcohol use: Not Currently    Drug use: Never        Review of Systems   Constitutional: Negative for chills and fever  HENT: Negative for ear pain, rhinorrhea and sore throat  Eyes: Negative for pain, redness and visual disturbance     Respiratory: Negative for cough and shortness of breath  Cardiovascular: Negative for chest pain and leg swelling  Gastrointestinal: Negative for abdominal pain, diarrhea, nausea and vomiting  Genitourinary: Negative for dysuria, flank pain, frequency and urgency  Musculoskeletal: Negative for back pain, myalgias and neck pain  Bilateral leg pain   Skin: Negative for rash  Neurological: Positive for seizures  Negative for dizziness, weakness, light-headedness and headaches  Seizure-like activity   Hematological: Negative  Psychiatric/Behavioral: Negative for agitation, confusion and suicidal ideas  The patient is not nervous/anxious  All other systems reviewed and are negative  Physical Exam  Physical Exam   Constitutional: He is oriented to person, place, and time  He appears well-developed and well-nourished  HENT:   Nose: Nose normal    Mouth/Throat: Oropharynx is clear and moist  No oropharyngeal exudate  Cleft lip and crowded oral pharynx   Eyes: Pupils are equal, round, and reactive to light  Conjunctivae and EOM are normal  No scleral icterus  Neck: Normal range of motion  Neck supple  No JVD present  No tracheal deviation present  Cardiovascular: Normal rate, regular rhythm and normal heart sounds  No murmur heard  Pulmonary/Chest: Effort normal and breath sounds normal  No respiratory distress  He has no wheezes  He has no rales  Abdominal: Soft  Bowel sounds are normal  There is no tenderness  There is no guarding  Musculoskeletal: Normal range of motion  He exhibits no edema or tenderness  Neurological: He is alert and oriented to person, place, and time  No cranial nerve deficit or sensory deficit  He exhibits normal muscle tone  5/5 motor, nl sens   Skin: Skin is warm and dry  Psychiatric: He has a normal mood and affect  His behavior is normal    Nursing note and vitals reviewed        Vital Signs  ED Triage Vitals [10/29/19 2020]   Temperature Pulse Respirations Blood Pressure SpO2   98 4 °F (36 9 °C) 80 16 134/79 96 %      Temp Source Heart Rate Source Patient Position - Orthostatic VS BP Location FiO2 (%)   Temporal Monitor Lying Right arm --      Pain Score       2           Vitals:    10/29/19 2020   BP: 134/79   Pulse: 80   Patient Position - Orthostatic VS: Lying         Visual Acuity      ED Medications  Medications - No data to display    Diagnostic Studies  Results Reviewed     None                 No orders to display              Procedures  Procedures       ED Course  ED Course as of Oct 30 0001   Tue Oct 29, 2019   2222 Patient had 1 apparent pseudo-seizure while here  Further discussion with him seems that he is unhappy with his current living situation  He is stressed to mostly incapable of expressing that frustration at his home  Nonetheless will continue with his discharge plan as from his hospitalization, to follow up with outpatient neuro, possible MRI and EEG  MDM    Disposition  Final diagnoses:   None     ED Disposition     None      Follow-up Information    None         Patient's Medications    No medications on file     No discharge procedures on file      ED Provider  Electronically Signed by           Opal Seo DO  10/30/19 0001

## 2019-10-30 NOTE — DISCHARGE INSTRUCTIONS
Follow up with family doctor  Follow-up with outpatient Neurology, EEG, MRI is instructed, but we will need to be set up by her family doctor apparently  Apparently home health care has been arranged, and may assist in this as well

## 2019-10-30 NOTE — ED NOTES
Spoke with mother per Dr Cat [place] request to get his follow ups taken care of  She is aware of his workup and shailesh for follow up and is calling to get him set up       Jeremy Nelson RN  10/30/19 3455

## 2019-11-07 ENCOUNTER — TELEPHONE (OUTPATIENT)
Dept: NEUROLOGY | Facility: CLINIC | Age: 44
End: 2019-11-07

## 2019-11-07 NOTE — TELEPHONE ENCOUNTER
Have you ever been seen by outpatient Neurology? No    Do you have a location preferenceYes, [place]    Do you prefer morning or afternoon? Afternoon     Was your hospital stay due to a work or MVA related injuryNo  (if yes, fill out)    If yes name of Insurance company:    Date of Injury:                   Open Claim  No     If yes Claim #     Name     [id number] phone #    Type of Insurance?  [place]/ [place]      Appointment Scheduled for: 11/20/19 @ 2pm

## 2019-11-20 ENCOUNTER — OFFICE VISIT (OUTPATIENT)
Dept: NEUROLOGY | Facility: CLINIC | Age: 44
End: 2019-11-20
Payer: MEDICARE

## 2019-11-20 VITALS
DIASTOLIC BLOOD PRESSURE: 88 MMHG | BODY MASS INDEX: 30.75 KG/M2 | HEIGHT: 73 IN | SYSTOLIC BLOOD PRESSURE: 144 MMHG | RESPIRATION RATE: 18 BRPM | WEIGHT: 232 LBS | HEART RATE: 75 BPM

## 2019-11-20 DIAGNOSIS — F79 MENTALLY CHALLENGED: Chronic | ICD-10-CM

## 2019-11-20 DIAGNOSIS — M25.551 RIGHT HIP PAIN: ICD-10-CM

## 2019-11-20 DIAGNOSIS — Z98.2 S/P VENTRICULOPERITONEAL SHUNT: ICD-10-CM

## 2019-11-20 DIAGNOSIS — R56.9 SEIZURE-LIKE ACTIVITY (HCC): Primary | ICD-10-CM

## 2019-11-20 PROCEDURE — 99214 OFFICE O/P EST MOD 30 MIN: CPT | Performed by: PSYCHIATRY & NEUROLOGY

## 2019-11-20 NOTE — ASSESSMENT & PLAN NOTE
Clearly, at least in part he has episodes are of a non epileptogenic origin  However, cannot at this point exclude the potential for an actual underlying epileptogenic process, especially given his past neurological history, the focal findings on his neurological examination, and the findings on his head CT  Will require additional study  --begin initially with a sleep-deprived EEG   --may require longer-term EEG monitoring   --would like to do brain MRI to complement his recent unenhanced head CT  However, with his  shunt, etc, would like Neurosurgery to see in advise first   --no AED coverage at this time  --patient does not drive nor hold an active 's license

## 2019-11-20 NOTE — LETTER
November 20, 2019     Bita Hummel DO  [facility] [address] [facility]    Patient: Park Del Real   YOB: 1975   Date of Visit: 11/20/2019       Dear Dr Africa Stout: Thank you for referring Park Del Real to me for evaluation  Below are my notes for this consultation  If you have questions, please do not hesitate to call me  I look forward to following your patient along with you  Sincerely,        Richard Tolliver MD        CC: No Recipients  Richard Tolliver MD  11/20/2019  3:27 PM  Sign at close encounter  Patient ID: Park Del Real is a 38 y o  male  Assessment/Plan:    Seizure-like activity (HCC)  Clearly, at least in part he has episodes are of a non epileptogenic origin  However, cannot at this point exclude the potential for an actual underlying epileptogenic process, especially given his past neurological history, the focal findings on his neurological examination, and the findings on his head CT  Will require additional study  --begin initially with a sleep-deprived EEG   --may require longer-term EEG monitoring   --would like to do brain MRI to complement his recent unenhanced head CT  However, with his  shunt, etc, would like Neurosurgery to see in advise first   --no AED coverage at this time  --patient does not drive nor hold an active 's license  S/P ventriculoperitoneal shunt  Details scant  However, apparently placed when he was still a baby as result of meningitis and I guess the potential for a resulting communicating hydrocephalus  Presently not documented  CT head demonstrates the presence of a  shunt catheter with the tip at the distal horn of the right lateral ventricle  Has not apparently been evaluated neurosurgically for some time now   --referred to Neurosurgery  Right hip area pain  Started a week or so ago    There is a history of a fall, but according to patient and his brother the fall not temporally related to the onset of symptoms  Describes pain with weight-bearing  On examination with painful limitation of internal and external rotation at the right hip   --x-rays right hip and pelvis  --spoke with the [facility], [place], at Dr Prakash Neri office and they will follow-up  Mentally challenged         He will follow up in 5-6 weeks  Subjective:    HPI  The patient, 38years of age and left-handed, presents for further evaluation regarding recurrent seizure-like episodes  He was accompanied today by his brother, Surjit No, who supplied the majority of the history as patient is intellectually limited  On the 27th of October, brother found patient lying in bed shaking generally and moaning  This went on for several minutes during which time patient was questionably responsive  He was then quickly back to baseline  No bowel or bladder incontinence  No tongue bite  No bodily injury  He did present to a local ED  A 2nd event occur while in the emergency room which was stop by suggestion  Seen by Neurology by telemedicine and patient was felt most likely to have non epileptogenic events  At that time CT brain was performed and revealed no acute changes  It did demonstrate the presence of a ventriculoperitoneal shunt catheter tip in the right ventricle  He was discharged on no AED  On October 29th 3rd episode occurred again while he was lying in bed  Brother found him with generalized shaking but apparently patient during this episode was verbally responsive  Again no bowel or bladder incontinence, tongue bite or bodily injury  He was again seen at the emergency department  An additional episode occurred this morning  Again while lying in bed he was found shaking generally  However, on this occasion, the brother was able to tap him on the shoulder and have him stop  Again no bowel or bladder incontinence, tongue bite or bodily injury    On questioning the patient himself, he replied that he sort a"recalls the episodes  Brother also states that some 10 years ago patient had a similar episode  At that time no AED was instituted  Strong family history of seizures  Father with apparently had generalized motor seizures as well as 2 paternal cousins  Patient's additional diagnoses include: In intellectual disability; surgically repaired cleft palate; and history of a ventriculoperitoneal shunt placement apparently as a baby after a bout of meningitis (no further details available at this time)  Over the past week is also been complaining of lateral right hip area pain, enhance by weight-bearing  Has been using a walker for ambulation  Recalls no temporally related injury  Past Medical History:   Diagnosis Date    Cleft hard palate     Seizures ([address] )      History reviewed  No pertinent surgical history    Social History     Socioeconomic History    Marital status: Single     Spouse name: None    Number of children: None    Years of education: None    Highest education level: None   Occupational History    None   Social Needs    Financial resource strain: None    Food insecurity:     Worry: None     Inability: None    Transportation needs:     Medical: None     Non-medical: None   Tobacco Use    Smoking status: Current Every Day Smoker     Packs/day: 0 50    Smokeless tobacco: Never Used   Substance and Sexual Activity    Alcohol use: Not Currently    Drug use: Never    Sexual activity: Not Currently   Lifestyle    Physical activity:     Days per week: None     Minutes per session: None    Stress: None   Relationships    Social connections:     Talks on phone: None     Gets together: None     Attends Mandaen service: None     Active member of club or organization: None     Attends meetings of clubs or organizations: None     Relationship status: None    Intimate partner violence:     Fear of current or ex partner: None     Emotionally abused: None     Physically abused: None Forced sexual activity: None   Other Topics Concern    None   Social History Narrative    None     History reviewed  No pertinent family history  No Known Allergies  No current outpatient medications on file  Objective:    Blood pressure 144/88, pulse 75, resp  rate 18, height 6' 1" (1 854 m), weight 105 kg (232 lb)  Physical Exam  Head normocephalic  Eyes nonicteric  No audible anterior neck bruits  Lungs clear to auscultation  Rhythm regular  GI (abdomen) soft nontender with bowel sounds present  No significant lower extremity edema  Painful, limited internal and external rotation right hip  Neurological Exam  Alert  Pleasantly interactive  Speech impediment noted  Answered correctly when asked name, date of birth, year, month and the actual day date  Right limping gait, utilizing walker for support  Romberg maneuver performed unremarkably  Cranial Nerves:   I: Olfactory sense intact bilaterally  II: Visual fields full to confrontation  Pupils equal, round, reactive to light with normal accomodation  Fundus: with bilaterally marginated discs  III,IV,VI: Extraocular muscles EOMI, no nystagmus  V: Masseter and pterygoid strength  Sensation in the V1 through V3 distributions intact to pinprick and light touch bilaterally  VII:  Facial asymmetry present  VIII: Audition intact to finger rub bilaterally  IX/X: Uvula midline  Soft palate elevation symmetric  XI: Trapezius and SCM strength 5/5 bilaterally  XII: Tongue midline with no atrophy or fasciculations with appropriate movement  Accurate with finger-to-nose bilaterally  Good symmetrical strength throughout the 4 extremities  No upper extremity drift  Sensory testing grossly intact to pin, position and vibration  Muscle stretch reflexes bilaterally 1 throughout the upper extremities, 1+ at the knees bilaterally, 3 at the right ankle and 2 at the left ankle  Toe response on the right prominently upgoing    Toe response on the left downgoing  ROS:    Review of Systems   Constitutional: Negative  Negative for appetite change and fever  HENT: Positive for tinnitus  Negative for hearing loss, trouble swallowing and voice change  Eyes: Negative  Negative for photophobia and pain  Respiratory: Negative  Negative for shortness of breath  Cardiovascular: Negative  Negative for palpitations  Gastrointestinal: Negative  Negative for nausea and vomiting  Endocrine: Negative  Negative for cold intolerance and heat intolerance  Genitourinary: Positive for frequency and urgency  Negative for dysuria  Musculoskeletal: Negative  Negative for myalgias and neck pain  Skin: Negative  Negative for rash  Neurological: Positive for dizziness, seizures (last seizures this morning) and speech difficulty  Negative for tremors, syncope, facial asymmetry, weakness, light-headedness, numbness and headaches  Hematological: Negative  Does not bruise/bleed easily  Psychiatric/Behavioral: Negative for confusion, hallucinations and sleep disturbance  The patient is nervous/anxious  *Please note this document was created using voice recognition software and may contain sound-alike word errors  *

## 2019-11-20 NOTE — ASSESSMENT & PLAN NOTE
Details scant  However, apparently placed when he was still a baby as result of meningitis and I guess the potential for a resulting communicating hydrocephalus  Presently not documented  CT head demonstrates the presence of a  shunt catheter with the tip at the distal horn of the right lateral ventricle  Has not apparently been evaluated neurosurgically for some time now   --referred to Neurosurgery

## 2019-11-20 NOTE — ASSESSMENT & PLAN NOTE
Started a week or so ago  There is a history of a fall, but according to patient and his brother the fall not temporally related to the onset of symptoms  Describes pain with weight-bearing  On examination with painful limitation of internal and external rotation at the right hip   --x-rays right hip and pelvis  --spoke with the [facility], at Dr Joshua Gonzalez office and they will follow-up

## 2019-11-20 NOTE — PROGRESS NOTES
Patient ID: Amadou Mitchell is a 38 y o  male  Assessment/Plan:    Seizure-like activity (HCC)  Clearly, at least in part he has episodes are of a non epileptogenic origin  However, cannot at this point exclude the potential for an actual underlying epileptogenic process, especially given his past neurological history, the focal findings on his neurological examination, and the findings on his head CT  Will require additional study  --begin initially with a sleep-deprived EEG   --may require longer-term EEG monitoring   --would like to do brain MRI to complement his recent unenhanced head CT  However, with his  shunt, etc, would like Neurosurgery to see in advise first   --no AED coverage at this time  --patient does not drive nor hold an active 's license  S/P ventriculoperitoneal shunt  Details scant  However, apparently placed when he was still a baby as result of meningitis and I guess the potential for a resulting communicating hydrocephalus  Presently not documented  CT head demonstrates the presence of a  shunt catheter with the tip at the distal horn of the right lateral ventricle  Has not apparently been evaluated neurosurgically for some time now   --referred to Neurosurgery  Right hip area pain  Started a week or so ago  There is a history of a fall, but according to patient and his brother the fall not temporally related to the onset of symptoms  Describes pain with weight-bearing  On examination with painful limitation of internal and external rotation at the right hip   --x-rays right hip and pelvis  --spoke with the [facility] Doctor, at Dr Rene Sen office and they will follow-up  Mentally challenged     I spent a total of 45 min with the patient with greater than 50% of that time spent counseling and coordinating his care, specifically discussing his diagnosis, additional tests, and discussing the case with his care team, as detailed above        He will follow up in 5-6 weeks  Subjective:    HPI  The patient, 38years of age and left-handed, presents for further evaluation regarding recurrent seizure-like episodes  He was accompanied today by his brother, Rudy Mohr, who supplied the majority of the history as patient is intellectually limited  On the 27th of October, brother found patient lying in bed shaking generally and moaning  This went on for several minutes during which time patient was questionably responsive  He was then quickly back to baseline  No bowel or bladder incontinence  No tongue bite  No bodily injury  He did present to a local ED  A 2nd event occur while in the emergency room which was stop by suggestion  Seen by Neurology by telemedicine and patient was felt most likely to have non epileptogenic events  At that time CT brain was performed and revealed no acute changes  It did demonstrate the presence of a ventriculoperitoneal shunt catheter tip in the right ventricle  He was discharged on no AED  On October 29th 3rd episode occurred again while he was lying in bed  Brother found him with generalized shaking but apparently patient during this episode was verbally responsive  Again no bowel or bladder incontinence, tongue bite or bodily injury  He was again seen at the emergency department  An additional episode occurred this morning  Again while lying in bed he was found shaking generally  However, on this occasion, the brother was able to tap him on the shoulder and have him stop  Again no bowel or bladder incontinence, tongue bite or bodily injury  On questioning the patient himself, he replied that he sort a"recalls the episodes  Brother also states that some 10 years ago patient had a similar episode  At that time no AED was instituted  Strong family history of seizures  Father with apparently had generalized motor seizures as well as 2 paternal cousins  Patient's additional diagnoses include:   In intellectual disability; surgically repaired cleft palate; and history of a ventriculoperitoneal shunt placement apparently as a baby after a bout of meningitis (no further details available at this time)  Over the past week is also been complaining of lateral right hip area pain, enhance by weight-bearing  Has been using a walker for ambulation  Recalls no temporally related injury  Past Medical History:   Diagnosis Date    Cleft hard palate     Seizures ([address] )      History reviewed  No pertinent surgical history  Social History     Socioeconomic History    Marital status: Single     Spouse name: None    Number of children: None    Years of education: None    Highest education level: None   Occupational History    None   Social Needs    Financial resource strain: None    Food insecurity:     Worry: None     Inability: None    Transportation needs:     Medical: None     Non-medical: None   Tobacco Use    Smoking status: Current Every Day Smoker     Packs/day: 0 50    Smokeless tobacco: Never Used   Substance and Sexual Activity    Alcohol use: Not Currently    Drug use: Never    Sexual activity: Not Currently   Lifestyle    Physical activity:     Days per week: None     Minutes per session: None    Stress: None   Relationships    Social connections:     Talks on phone: None     Gets together: None     Attends Faith service: None     Active member of club or organization: None     Attends meetings of clubs or organizations: None     Relationship status: None    Intimate partner violence:     Fear of current or ex partner: None     Emotionally abused: None     Physically abused: None     Forced sexual activity: None   Other Topics Concern    None   Social History Narrative    None     History reviewed  No pertinent family history  No Known Allergies  No current outpatient medications on file  Objective:    Blood pressure 144/88, pulse 75, resp   rate 18, height 6' 1" (1 854 m), weight 105 kg (232 lb)  Physical Exam  Head normocephalic  Eyes nonicteric  No audible anterior neck bruits  Lungs clear to auscultation  Rhythm regular  GI (abdomen) soft nontender with bowel sounds present  No significant lower extremity edema  Painful, limited internal and external rotation right hip  Neurological Exam  Alert  Pleasantly interactive  Speech impediment noted  Answered correctly when asked name, date of birth, year, month and the actual day date  Right limping gait, utilizing walker for support  Romberg maneuver performed unremarkably  Cranial Nerves:   I: Olfactory sense intact bilaterally  II: Visual fields full to confrontation  Pupils equal, round, reactive to light with normal accomodation  Fundus: with bilaterally marginated discs  III,IV,VI: Extraocular muscles EOMI, no nystagmus  V: Masseter and pterygoid strength  Sensation in the V1 through V3 distributions intact to pinprick and light touch bilaterally  VII:  Facial asymmetry present  VIII: Audition intact to finger rub bilaterally  IX/X: Uvula midline  Soft palate elevation symmetric  XI: Trapezius and SCM strength 5/5 bilaterally  XII: Tongue midline with no atrophy or fasciculations with appropriate movement  Accurate with finger-to-nose bilaterally  Good symmetrical strength throughout the 4 extremities  No upper extremity drift  Sensory testing grossly intact to pin, position and vibration  Muscle stretch reflexes bilaterally 1 throughout the upper extremities, 1+ at the knees bilaterally, 3 at the right ankle and 2 at the left ankle  Toe response on the right prominently upgoing  Toe response on the left downgoing  ROS:    Review of Systems   Constitutional: Negative  Negative for appetite change and fever  HENT: Positive for tinnitus  Negative for hearing loss, trouble swallowing and voice change  Eyes: Negative  Negative for photophobia and pain  Respiratory: Negative    Negative for shortness of breath  Cardiovascular: Negative  Negative for palpitations  Gastrointestinal: Negative  Negative for nausea and vomiting  Endocrine: Negative  Negative for cold intolerance and heat intolerance  Genitourinary: Positive for frequency and urgency  Negative for dysuria  Musculoskeletal: Negative  Negative for myalgias and neck pain  Skin: Negative  Negative for rash  Neurological: Positive for dizziness, seizures (last seizures this morning) and speech difficulty  Negative for tremors, syncope, facial asymmetry, weakness, light-headedness, numbness and headaches  Hematological: Negative  Does not bruise/bleed easily  Psychiatric/Behavioral: Negative for confusion, hallucinations and sleep disturbance  The patient is nervous/anxious  *Please note this document was created using voice recognition software and may contain sound-alike word errors  *

## 2019-11-26 ENCOUNTER — APPOINTMENT (EMERGENCY)
Dept: RADIOLOGY | Facility: HOSPITAL | Age: 44
End: 2019-11-26
Payer: MEDICARE

## 2019-11-26 ENCOUNTER — HOSPITAL ENCOUNTER (EMERGENCY)
Facility: HOSPITAL | Age: 44
Discharge: HOME | End: 2019-11-26
Attending: EMERGENCY MEDICINE | Admitting: EMERGENCY MEDICINE
Payer: MEDICARE

## 2019-11-26 VITALS
RESPIRATION RATE: 18 BRPM | DIASTOLIC BLOOD PRESSURE: 72 MMHG | SYSTOLIC BLOOD PRESSURE: 138 MMHG | HEIGHT: 72 IN | TEMPERATURE: 98.4 F | BODY MASS INDEX: 28.17 KG/M2 | OXYGEN SATURATION: 95 % | HEART RATE: 77 BPM | WEIGHT: 208 LBS

## 2019-11-26 DIAGNOSIS — M25.551 RIGHT HIP PAIN: Primary | ICD-10-CM

## 2019-11-26 PROCEDURE — 99283 EMERGENCY DEPT VISIT LOW MDM: CPT

## 2019-11-26 PROCEDURE — 99284 EMERGENCY DEPT VISIT MOD MDM: CPT | Performed by: EMERGENCY MEDICINE

## 2019-11-26 PROCEDURE — 73521 X-RAY EXAM HIPS BI 2 VIEWS: CPT

## 2019-11-26 RX ORDER — NAPROXEN 500 MG/1
500 TABLET ORAL ONCE
Status: COMPLETED | OUTPATIENT
Start: 2019-11-26 | End: 2019-11-26

## 2019-11-26 RX ORDER — METHOCARBAMOL 500 MG/1
500 TABLET, FILM COATED ORAL ONCE
Status: COMPLETED | OUTPATIENT
Start: 2019-11-26 | End: 2019-11-26

## 2019-11-26 RX ADMIN — METHOCARBAMOL TABLETS 500 MG: 500 TABLET, COATED ORAL at 14:59

## 2019-11-26 RX ADMIN — NAPROXEN 500 MG: 500 TABLET ORAL at 14:59

## 2019-11-26 NOTE — ED PROVIDER NOTES
Pt Name: Sixto Vanessa  MRN: [id number]  [place] 1975  Age/Sex: 38 y o  male  Date of evaluation: 11/26/2019  PCP: Nazario Machuca, [address] [place] [address]    Chief Complaint   Patient presents with    Fall     Pt fell 5 days ago as a result of having a seizure; c/o right hip pain          HPI    Nereyda Marcelo presents to the Emergency Department complaining of right hip pain  He went to see his neurologist and had an outpatient xray ordered but had not gotten it done  HPI      Past Medical and Surgical History    Past Medical History:   Diagnosis Date    Cleft hard palate     Seizures ([facility] [address] )        Past Surgical History:   Procedure Laterality Date    BRAIN SURGERY         History reviewed  No pertinent family history  Social History     Tobacco Use    Smoking status: Current Every Day Smoker     Packs/day: 0 50     Types: Cigarettes    Smokeless tobacco: Never Used   Substance Use Topics    Alcohol use: Not Currently    Drug use: Never           Allergies    No Known Allergies    Home Medications    Prior to Admission medications    Not on File           Review of Systems    Review of Systems   Constitutional: Negative for activity change, appetite change, chills, fatigue and fever  HENT: Negative for congestion, rhinorrhea, sinus pressure, sneezing, sore throat and trouble swallowing  Eyes: Negative for photophobia and visual disturbance  Respiratory: Negative for chest tightness, shortness of breath and wheezing  Cardiovascular: Negative for chest pain and leg swelling  Gastrointestinal: Negative for abdominal distention, abdominal pain, constipation, diarrhea, nausea and vomiting  Endocrine: Negative for polydipsia, polyphagia and polyuria  Genitourinary: Negative for decreased urine volume, difficulty urinating, dysuria, flank pain, frequency and urgency  Musculoskeletal: Positive for arthralgias  Negative for back pain, gait problem, joint swelling and neck pain     Skin: Negative for color change, pallor and rash  Allergic/Immunologic: Negative for immunocompromised state  Neurological: Negative for seizures, syncope, speech difficulty, weakness, light-headedness and headaches  Psychiatric/Behavioral: Negative for confusion  All other systems reviewed and are negative  Physical Exam      ED Triage Vitals [11/26/19 1336]   Temperature Pulse Respirations Blood Pressure SpO2   98 4 °F (36 9 °C) 77 18 138/72 95 %      Temp Source Heart Rate Source Patient Position - Orthostatic VS BP Location FiO2 (%)   Temporal Monitor -- Left arm --      Pain Score       Worst Possible Pain               Physical Exam   Constitutional: He is oriented to person, place, and time  He appears well-developed and well-nourished  No distress  HENT:   Head: Normocephalic and atraumatic  Nose: Nose normal    Mouth/Throat: Oropharynx is clear and moist    Eyes: Pupils are equal, round, and reactive to light  Conjunctivae and EOM are normal    Neck: Normal range of motion  Neck supple  Cardiovascular: Normal rate, regular rhythm and normal heart sounds  Exam reveals no gallop and no friction rub  No murmur heard  Pulmonary/Chest: Effort normal and breath sounds normal  No respiratory distress  He has no wheezes  He has no rales  Abdominal: Soft  Bowel sounds are normal  There is no tenderness  There is no rebound and no guarding  Musculoskeletal:        Right hip: He exhibits normal range of motion, normal strength, no tenderness, no bony tenderness, no swelling, no crepitus, no deformity and no laceration  Legs:  Neurological: He is alert and oriented to person, place, and time  Skin: Skin is warm and dry  He is not diaphoretic  Psychiatric: He has a normal mood and affect  His behavior is normal    Nursing note and vitals reviewed  Assessment and Plan    Amadou Mitchell is a 38 y o  male who presents with right hip pain   Physical examination otherwise unremarkable  Plan will be to perform diagnostic testing and treat symptomatically  [facility]    Diagnostic Results          Labs:  Radiology:    XR hips bilateral with ap pelvis 2 vw   Final Result      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  Workstation performed: [id number]             All radiology studies independently viewed by me and interpreted by the radiologist     Procedure    Procedures      ED Course of Care and Re-Assessments        Medications   naproxen (NAPROSYN) tablet 500 mg (500 mg Oral Given 11/26/19 1459)   methocarbamol (ROBAXIN) tablet 500 mg (500 mg Oral Given 11/26/19 1459)           FINAL IMPRESSION    Final diagnoses:   Right hip pain         DISPOSITION/PLAN    Time reflects when diagnosis was documented in both MDM as applicable and the Disposition within this note     Time User Action Codes Description Comment    11/26/2019  4:12 PM Jsisela Butler Tami [M25 551] Right hip pain       ED Disposition     ED Disposition Condition Date/Time Comment    Discharge Stable Tue Nov 26, 2019  4:12 PM Sixto Vanessa discharge to home/self care  Follow-up Information     Follow up With Specialties Details Why Contact Info    Nazario Machuca DO Internal Medicine Schedule an appointment as soon as possible for a visit   [address] [facility] 1  [facility] [address] [place] [address]      David Lewis DO Orthopedic Surgery Schedule an appointment as soon as possible for a visit in 2 days  [address]  [address] [facility] [address] [facility] [address]              PATIENT REFERRED TO:    Nazario Machuca DO  [address]  [phone]    Schedule an appointment as soon as possible for a visit       David Lewis DO  [address]   [address] [facility] 9 200  [address] [facility] [address]    Schedule an appointment as soon as possible for a visit in 2 days        DISCHARGE MEDICATIONS:    There are no discharge medications for this patient  No discharge procedures on file           Roxane Mac, [address] [facility],   11/26/19 8527

## 2019-12-05 ENCOUNTER — CONSULT (OUTPATIENT)
Dept: NEUROSURGERY | Facility: CLINIC | Age: 44
End: 2019-12-05
Payer: MEDICARE

## 2019-12-05 VITALS
BODY MASS INDEX: 30.53 KG/M2 | HEIGHT: 72 IN | HEART RATE: 72 BPM | WEIGHT: 225.4 LBS | DIASTOLIC BLOOD PRESSURE: 78 MMHG | TEMPERATURE: 98.7 F | SYSTOLIC BLOOD PRESSURE: 125 MMHG

## 2019-12-05 DIAGNOSIS — F79 MENTALLY CHALLENGED: Chronic | ICD-10-CM

## 2019-12-05 DIAGNOSIS — Z98.2 S/P VENTRICULOPERITONEAL SHUNT: Primary | ICD-10-CM

## 2019-12-05 DIAGNOSIS — G40.909 SEIZURE DISORDER (HCC): ICD-10-CM

## 2019-12-05 PROCEDURE — 99203 OFFICE O/P NEW LOW 30 MIN: CPT | Performed by: PHYSICIAN ASSISTANT

## 2019-12-05 RX ORDER — ACETAMINOPHEN 500 MG
500 TABLET ORAL EVERY 6 HOURS PRN
COMMUNITY
End: 2019-12-10

## 2019-12-05 NOTE — ASSESSMENT & PLAN NOTE
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  · Mother denies any shunt revisions  · Patient is mentally challenged but very smart and healthy now; lives in parent's basement semi-independently  · 3-4 week history of seizure activity; father and several paternal relatives with epilepsy    Imaging:  · CT head, 10/27/19: No intracranial mass, mass effect or midline shift  No CT signs of acute infarction  No acute parenchymal hemorrhage  There is a right frontal ventricular shunt catheter with the tip at the distal horn of the right lateral ventricle  There is a tiny focus of encephalomalacia at the anterior left temporal lobe  Ventricles normal for patient's age      Plan:  · Shunt does appear to be functioning  · Patient without acute change in mental status or gait abnormality  · CT head shows tip of shunt in right lateral ventricle; no evidence of ventriculomegaly  · No reason to order further testing  · Patient is cleared to continue with seizure workup/MRI/sleep study from a NSG perspective

## 2019-12-05 NOTE — PROGRESS NOTES
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; shunt placed when a few days old at [facility]  · Mother denies any shunt revisions  · Patient is mentally challenged but very smart and healthy now; lives in parent's basement semi-independently  · 3-4 week history of seizure activity; father and several paternal relatives with epilepsy    Imaging:  · CT head, 10/27/19: No intracranial mass, mass effect or midline shift  No CT signs of acute infarction  No acute parenchymal hemorrhage  There is a right frontal ventricular shunt catheter with the tip at the distal horn of the right lateral ventricle  There is a tiny focus of encephalomalacia at the anterior left temporal lobe  Ventricles normal for patient's age  Plan:  · Shunt does appear to be functioning  · Patient without acute change in mental status or gait abnormality  · CT head shows tip of shunt in right lateral ventricle; no evidence of ventriculomegaly  · No reason to order further testing  · Patient is cleared to continue with seizure workup/MRI/sleep study from a NSG perspective       Diagnoses and all orders for this visit:    S/P ventriculoperitoneal shunt  -     Ambulatory referral to Neurosurgery    Mentally challenged    Seizure disorder ([facility] [address] )    Other orders  -     acetaminophen (TYLENOL) 500 mg tablet; Take 500 mg by mouth every 6 (six) hours as needed for mild pain  -     Liniments (SALONPAS ARTHRITIS PAIN RELIEF EX); Apply topically            CHIEF COMPLAINT    Chief Complaint   Patient presents with    Consult     S/P  Shunt        HISTORY    This is a 67-year-old male who we are seeing today for a shunt evaluation  He is accompanied by his mother, Kaylin Angeles, who provided his history    The patient was born prematurely at 29 weeks and developed meningitis immediately after birth  He required insertion of a  shunt at several days old  His mother states the shunt save his life  [facility] [place]  He was discharged to home 1 day before his original due date  She states that he has never had a shunt revision since the original placement  He has never had a problem with the shunt and required no further follow-up  As this was placed 43 years ago there are no records  He has been fairly healthy over the years apart from some intellectual disability  He lives in his parent's basement  Several weeks ago, he developed seizure-like activity  His current following with Dr Glenna Engel in Neurology to undergo a seizure workup  His father had a seizure disorder and several family members on his paternal side also had a seizure disorder  At this time he is not on AEDs  CT of the head was performed in October 2019 which showed no evidence of ventriculomegaly  There is evidence of a shunt catheter tip within the right lateral ventricle  The patient has not had a acute mental status change or difficulty with ambulation apart from a recent fall during a seizure where he injured his right hip  With a normal CT of the head and no change in his mental status, I do not believe he needs a further workup to evaluate his shunt  Patient does appear to be working properly  He denies any abdominal pain  He is cleared to undergo seizure workup including MRI studies and sleep studies  REVIEW OF SYSTEMS    Review of Systems   Musculoskeletal: Positive for arthralgias (Pain right hip )  Neurological: Positive for seizures  All other systems reviewed and are negative  Meds/Allergies     No current outpatient medications on file  No current facility-administered medications for this visit          No Known Allergies    PAST HISTORY    Past Medical History:   Diagnosis Date    Cleft hard palate     Seizures ([facility] [address] ) Past Surgical History:   Procedure Laterality Date    BRAIN SURGERY         Social History     Tobacco Use    Smoking status: Current Every Day Smoker     Packs/day: 0 50     Types: Cigarettes    Smokeless tobacco: Never Used   Substance Use Topics    Alcohol use: Not Currently    Drug use: Never       No family history on file  Above history personally reviewed  EXAM    Vitals:Blood pressure 125/78, pulse 72, temperature 98 7 °F (37 1 °C), temperature source Temporal, height 6' (1 829 m), weight 102 kg (225 lb 6 4 oz)  ,Body mass index is 30 57 kg/m²  Physical Exam   Constitutional: He is oriented to person, place, and time  He appears well-developed and well-nourished  HENT:   Head: Normocephalic and atraumatic  Shunt reservoir present right forehead, not easily compressible   Eyes: Pupils are equal, round, and reactive to light  Neck: Normal range of motion  Cardiovascular: Normal rate  Pulmonary/Chest: Effort normal  No respiratory distress  Abdominal: Soft  Musculoskeletal: Normal range of motion  Neurological: He is alert and oriented to person, place, and time  He has a normal Finger-Nose-Finger Test    Reflex Scores:       Tricep reflexes are 2+ on the right side and 2+ on the left side  Bicep reflexes are 2+ on the right side and 2+ on the left side  Brachioradialis reflexes are 2+ on the right side and 2+ on the left side  Patellar reflexes are 2+ on the right side and 2+ on the left side  Achilles reflexes are 2+ on the right side and 2+ on the left side  Skin: Skin is warm and dry  Psychiatric: He has a normal mood and affect  His speech is normal and behavior is normal  Judgment and thought content normal    Mentally challenged   Nursing note and vitals reviewed  Neurologic Exam     Mental Status   Oriented to person, place, and time  Recall at 5 minutes: recalls 3 of 3 objects  Follows 2 step commands     Attention: normal  Concentration: normal    Speech: speech is normal   Level of consciousness: alert  Knowledge: good  Able to perform simple calculations  Able to name object  Able to repeat  Normal comprehension  Cranial Nerves   Cranial nerves II through XII intact  CN III, IV, VI   Pupils are equal, round, and reactive to light  Motor Exam   Muscle bulk: normal  Overall muscle tone: normal  Right arm pronator drift: absent  Left arm pronator drift: absent    Strength   Strength 5/5 except as noted  Right strength: Strength limited by right hip pain  Right iliopsoas: 3/5  Right quadriceps: 3/5  Right hamstring: 3/5    Sensory Exam   Light touch normal    Pinprick normal    DST and JPS intact bilaterally     Gait, Coordination, and Reflexes     Gait  Gait: (antalgic with walker)    Coordination   Finger to nose coordination: normal    Tremor   Resting tremor: absent    Reflexes   Right brachioradialis: 2+  Left brachioradialis: 2+  Right biceps: 2+  Left biceps: 2+  Right triceps: 2+  Left triceps: 2+  Right patellar: 2+  Left patellar: 2+  Right achilles: 2+  Left achilles: 2+  Right : 2+  Left : 2+  Right Huber: absent  Left Huber: absent  Right ankle clonus: absent  Left ankle clonus: absent        MEDICAL DECISION MAKING    Imaging Studies:     CT head w/o, 10/27/19: No intracranial abnormality  No ventriculomegaly  Right frontal ventricular shunt catheter with tip at the distal horn of the right lateral ventricle  I have personally reviewed pertinent reports     and I have personally reviewed pertinent films in PACS

## 2019-12-10 ENCOUNTER — OFFICE VISIT (OUTPATIENT)
Dept: INTERNAL MEDICINE CLINIC | Facility: CLINIC | Age: 44
End: 2019-12-10
Payer: MEDICARE

## 2019-12-10 VITALS
WEIGHT: 228 LBS | BODY MASS INDEX: 30.88 KG/M2 | SYSTOLIC BLOOD PRESSURE: 116 MMHG | TEMPERATURE: 99.4 F | HEIGHT: 72 IN | OXYGEN SATURATION: 98 % | RESPIRATION RATE: 16 BRPM | DIASTOLIC BLOOD PRESSURE: 68 MMHG | HEART RATE: 74 BPM

## 2019-12-10 DIAGNOSIS — Z13.220 SCREENING FOR LIPID DISORDERS: ICD-10-CM

## 2019-12-10 DIAGNOSIS — Z11.4 SCREENING FOR HIV (HUMAN IMMUNODEFICIENCY VIRUS): ICD-10-CM
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
## 2019-12-10 DIAGNOSIS — Z98.2 S/P VENTRICULOPERITONEAL SHUNT: ICD-10-CM

## 2019-12-10 DIAGNOSIS — E66.9 OBESITY (BMI 30.0-34.9): ICD-10-CM

## 2019-12-10 DIAGNOSIS — G89.29 CHRONIC RIGHT HIP PAIN: ICD-10-CM

## 2019-12-10 DIAGNOSIS — Z72.0 TOBACCO ABUSE: Chronic | ICD-10-CM

## 2019-12-10 DIAGNOSIS — R56.9 SEIZURE-LIKE ACTIVITY (HCC): Primary | ICD-10-CM

## 2019-12-10 DIAGNOSIS — M25.551 CHRONIC RIGHT HIP PAIN: ICD-10-CM

## 2019-12-10 DIAGNOSIS — F79 MENTALLY CHALLENGED: Chronic | ICD-10-CM

## 2019-12-10 DIAGNOSIS — M25.859 FEMORAL ACETABULAR IMPINGEMENT: ICD-10-CM

## 2019-12-10 DIAGNOSIS — Z23 ENCOUNTER FOR VACCINATION: ICD-10-CM

## 2019-12-10 DIAGNOSIS — R26.9 GAIT ABNORMALITY: ICD-10-CM

## 2019-12-10 PROBLEM — G40.909 SEIZURE DISORDER (HCC): Status: RESOLVED | Noted: 2019-10-27 | Resolved: 2019-12-10
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
## 2019-12-10 PROCEDURE — 90471 IMMUNIZATION ADMIN: CPT | Performed by: INTERNAL MEDICINE

## 2019-12-10 PROCEDURE — 99203 OFFICE O/P NEW LOW 30 MIN: CPT | Performed by: INTERNAL MEDICINE

## 2019-12-10 PROCEDURE — 90715 TDAP VACCINE 7 YRS/> IM: CPT | Performed by: INTERNAL MEDICINE

## 2019-12-10 RX ORDER — MELOXICAM 15 MG/1
15 TABLET ORAL DAILY
Qty: 90 TABLET | Refills: 3 | Status: SHIPPED | OUTPATIENT
Start: 2019-12-10

## 2019-12-10 NOTE — PROGRESS NOTES
Assessment/Plan:  Problem List Items Addressed This Visit        Other    Mentally challenged (Chronic)    Tobacco abuse (Chronic)    Seiz[facility] ([facility] ) - Primary    S/P ventriculoperitoneal shunt      Other Visit Diagnoses     Obesity (BMI 30 0-34  9)        BMI 30 0-30 9,adult        Encounter for vaccination        Relevant Orders    TDAP VACCINE GREATER THAN OR EQUAL TO 6YO IM    Chronic right hip pain        Relevant Medications    meloxicam (MOBIC) 15 mg tablet    Other Relevant Orders    Ambulatory referral to Physical Therapy    Fall, initial encounter        Relevant Medications    meloxicam (MOBIC) 15 mg tablet    Other Relevant Orders    Ambulatory referral to Physical Therapy    Gait abnormality        Relevant Medications    meloxicam (MOBIC) 15 mg tablet    Other Relevant Orders    Ambulatory referral to Physical Therapy    Femoral acetabular impingement        Relevant Medications    meloxicam (MOBIC) 15 mg tablet    Other Relevant Orders    Ambulatory referral to Physical Therapy    Screening for lipid disorders        Relevant Orders    Lipid Panel with Direct LDL reflex    Screening for HIV (human immunodeficiency virus)        Relevant Orders    Human Immunodeficiency Virus 1/2 Antigen / Antibody ( Fourth Generation) with Reflex Testing           Diagnoses and all orders for this visit:    Seizure-like activity ([facility] )    Obesity (BMI 30 0-34  9)    BMI 30 0-30 9,adult    S/P ventriculoperitoneal shunt    Mentally challenged    Tobacco abuse    Encounter for vaccination  -     TDAP VACCINE GREATER THAN OR EQUAL TO 6YO IM    Chronic right hip pain  -     Ambulatory referral to Physical Therapy; Future  -     meloxicam (MOBIC) 15 mg tablet; Take 1 tablet (15 mg total) by mouth daily    Fall, initial encounter  -     Ambulatory referral to Physical Therapy; Future  -     meloxicam (MOBIC) 15 mg tablet;  Take 1 tablet (15 mg total) by mouth daily    Gait abnormality  -     Ambulatory referral to Physical Therapy; Future  -     meloxicam (MOBIC) 15 mg tablet; Take 1 tablet (15 mg total) by mouth daily    Femoral acetabular impingement  -     Ambulatory referral to Physical Therapy; Future  -     meloxicam (MOBIC) 15 mg tablet; Take 1 tablet (15 mg total) by mouth daily    Screening for lipid disorders  -     Lipid Panel with Direct LDL reflex; Future    Screening for HIV (human immunodeficiency virus)  -     Human Immunodeficiency Virus 1/2 Antigen / Antibody ( Fourth Generation) with Reflex Testing; Future        No problem-specific Assessment & Plan notes found for this encounter  A/P: Doing ok and recently had labs and flu vaccine  Will check FLP and HIV  Will up date his Td  Will check an alpha one and wean tobacco  Discussed BMI and will give information on diet and exercise  Appreciate neurosx and neuro input  Await further testing and treatment for the sz  ??hip pain  Xray ok except for the impingement and ?new or old  Will start NSAID's and refer to PT  May need to see ortho  Continue current treatment otherwise and RTC three weeks for f/u labs, hip, and ACW  Subjective:      Patient ID: Park Del Real is a 38 y o  male  Intellectually challenged WM, former pt of ?Dr Coy Houser, presents to establish with his brother  PMH includes new onset sz activity, gait problems due to hip pain, and a smoker  PSH of  shunt and dental extraction  Daily Smoker and rare ETOH  Doing ok and no c/o's, but still being w/u by neuro for the cause of his sz  Pt recently fell several weeks ago and has been having right hip pain  Xray showed ?impingement syndrome  Using a walker now and no treatment    Remains active otherwise and no falls since  Denies depression  No suicidal or violent ideations  Disabled and on no meds and has ISMAEL Eason No recent travel  No fever, chills, or sweats  No unexplained wt change  Denies CP, SOB, palpitations, edema, orthopnea, or PND  No syncope  No changes in bowel or bladder habits  No trouble swallowing  Appetite and sleep are good  Doesn't see a DDS, but is up to date with an eye doctor  Currently due for labs and vaccines                The following portions of the patient's history were reviewed and updated as appropriate:   He has a past medical history of Cleft hard palate and Seizures ([address] )  ,  does not have any pertinent problems on file  ,   has a past surgical history that includes Brain surgery and Cleft lip repair  ,  family history includes COPD in his mother; Heart disease in his father; Hypertension in his mother; Seizures in his father  ,   reports that he has been smoking cigarettes  He has been smoking about 0 50 packs per day  He has quit using smokeless tobacco  He reports that he drank alcohol  He reports that he does not use drugs  ,  has No Known Allergies     Current Outpatient Medications   Medication Sig Dispense Refill    meloxicam (MOBIC) 15 mg tablet Take 1 tablet (15 mg total) by mouth daily 90 tablet 3     No current facility-administered medications for this visit  Review of Systems   Constitutional: Positive for activity change  Negative for chills, diaphoresis, fatigue and fever  HENT: Negative  Eyes: Negative for visual disturbance  Respiratory: Negative for cough, chest tightness, shortness of breath and wheezing  Cardiovascular: Negative for chest pain, palpitations and leg swelling  Gastrointestinal: Negative for abdominal pain, constipation, diarrhea, nausea and vomiting  Endocrine: Negative for cold intolerance and heat intolerance  Genitourinary: Negative for difficulty urinating, dysuria and frequency  Musculoskeletal: Positive for arthralgias and gait problem  Negative for joint swelling and myalgias  Neurological: Positive for seizures  Negative for dizziness, tremors, syncope, weakness, light-headedness and headaches  Psychiatric/Behavioral: Negative for confusion, dysphoric mood, sleep disturbance and suicidal ideas   The patient is not nervous/anxious  PHQ-9 Depression Screening    PHQ-9:    Frequency of the following problems over the past two weeks:       Little interest or pleasure in doing things:  0 - not at all  Feeling down, depressed, or hopeless:  0 - not at all  PHQ-2 Score:  0        Objective:  Vitals:    12/10/19 1105   BP: 116/68   BP Location: Right arm   Patient Position: Sitting   Cuff Size: Large   Pulse: 74   Resp: 16   Temp: 99 4 °F (37 4 °C)   SpO2: 98%   Weight: 103 kg (228 lb)   Height: 6' (1 829 m)     Body mass index is 30 92 kg/m²  Physical Exam   Constitutional: He is oriented to person, place, and time  He appears well-developed and well-nourished  No distress  HENT:   Head: Normocephalic and atraumatic  Mouth/Throat: Oropharynx is clear and moist    Eyes: Pupils are equal, round, and reactive to light  Conjunctivae and EOM are normal    Neck: Normal range of motion  Neck supple  No JVD present  No tracheal deviation present  No thyromegaly present  Cardiovascular: Normal rate, regular rhythm and normal heart sounds  Pulmonary/Chest: Effort normal and breath sounds normal  No respiratory distress  He has no wheezes  He has no rales  Abdominal: Soft  Bowel sounds are normal  He exhibits no distension  There is no tenderness  Musculoskeletal: He exhibits tenderness  He exhibits no edema or deformity  Right hip joint w/o any gross deformities, increase temp, erythema, or swelling  No crepitus  No effusions or ballotment  Joint integrity intact  ROM decreased all planes  Tenderness diffusely and pt having trouble localizing the pain  Pain with wt bearing  Velna [place] Lymphadenopathy:     He has no cervical adenopathy  Neurological: He is alert and oriented to person, place, and time  He displays normal reflexes  No cranial nerve deficit  He exhibits normal muscle tone  Coordination normal    Psychiatric: He has a normal mood and affect  His behavior is normal    Slow processing  Nursing note and vitals reviewed  BMI Counseling: Body mass index is 30 92 kg/m²  The BMI is above normal  Nutrition recommendations include reducing portion sizes, decreasing overall calorie intake, reducing intake of saturated fat and trans fat and reducing intake of cholesterol  Exercise recommendations include moderate aerobic physical activity for 150 minutes/week  Tobacco Cessation Counseling: Tobacco cessation counseling and education was provided  The patient is sincerely urged to quit consumption of tobacco  He is not ready to quit tobacco  The numerous health risks of tobacco consumption were discussed  If he decides to quit, there are a number of helpful adjunctive aids, and he can see me to discuss nicotine replacement therapy, chantix, or bupropion anytime in the future

## 2019-12-10 NOTE — PATIENT INSTRUCTIONS

## 2019-12-16 ENCOUNTER — EVALUATION (OUTPATIENT)
Dept: PHYSICAL THERAPY | Facility: CLINIC | Age: 44
End: 2019-12-16
Payer: MEDICARE

## 2019-12-16 DIAGNOSIS — G89.29 CHRONIC RIGHT HIP PAIN: Primary | ICD-10-CM

## 2019-12-16 DIAGNOSIS — M25.859 FEMORAL ACETABULAR IMPINGEMENT: ICD-10-CM

## 2019-12-16 DIAGNOSIS — M25.551 CHRONIC RIGHT HIP PAIN: Primary | ICD-10-CM
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
## 2019-12-16 DIAGNOSIS — R26.9 GAIT ABNORMALITY: ICD-10-CM

## 2019-12-16 PROCEDURE — 97110 THERAPEUTIC EXERCISES: CPT

## 2019-12-16 NOTE — PROGRESS NOTES
PT Evaluation     Today's date: 2019  Patient name: Pedrito Mckeon  : 1975  MRN: [id number]  Referring provider: Justice Matos DO  Dx:   Encounter Diagnosis     ICD-10-CM    1  Chronic right hip pain M25 551     G89 29    2  Gait abnormality R26 9    3  Femoral acetabular impingement M25 859                   Assessment  Assessment details: Pedrito Mckeon is a 38 y o  male presenting to outpatient physical therapy with diagnosis of Chronic right hip pain  (primary encounter diagnosis)  Gait abnormality  Femoral acetabular impingement  Fall, initial encounter  Patient's current impairments include R hip pain, impaired soft tissue mobility, reduced R hip range of motion, reduced R hip  strength, reduced postural awareness, and reduced activity tolerance  Patient's present functional limitations include difficulty with ADLs with increased need for assistance, reliance on medication and/or modalities for pain relief, poor tolerance for functional mobility and activity, and difficulty completing [facility]  responsibilities  Patient to benefit from skilled outpatient physical therapy 2x/week for 4 weeks in order to reduce pain, maximize pain free range of motion, increase strength and stability, and improve functional mobility/functional activity in order to maximize return to prior level of function with reduced limitations  Thank you for your referral     Impairments: abnormal gait, abnormal or restricted ROM, activity intolerance, difficulty understanding, impaired balance, impaired physical strength, lacks appropriate home exercise program, pain with function, safety issue and poor posture   Barriers to therapy: Mentally challenged, seizures    Goals  STGs to be achieved in 4 weeks:  1  Pt to demonstrate reduced subjective pain rating "at worst" by at least 2-3 points from Initial Eval in order to allow for reduced pain with ADLs and improved functional activity tolerance     2  Pt to demonstrate increased AROM of R hip  by at least 5-10 degrees in order to allow for greater ease and independence with ADLs and functional mobility  3  Pt to demonstrate full PROM of R hip  in order to maximize joint mobility and function and allow for progression of exercise program and achievement of goals  4  Pt to demonstrate increased MMT of R hip by at least 1/2-1 grade in order to improve safety and stability with ADLs and functional mobility  LTGs to be achieved in 6-8 weeks:  1  Pt will be I with HEP in order to continue to improve quality of life and independence and reduce risk for re-injury  2  Pt to demonstrate return to Indep amb without asst devices withoout limitations or restrictions  3  Pt to demonstrate improved function as noted by achieving or exceeding predicted score on FOTO outcomes assessment tool  Plan  Patient would benefit from: skilled physical therapy  Planned modality interventions: cryotherapy and thermotherapy: hydrocollator packs  Planned therapy interventions: therapeutic exercise, manual therapy, strengthening, stretching, balance, gait training and patient education  Frequency: 2x week  Duration in weeks: 4  Plan of Care beginning date: 2019  Plan of Care expiration date: 2020  Treatment plan discussed with: patient        Subjective Evaluation    History of Present Illness  Mechanism of injury: Pt states he fell out of bed and injured his R hip  Was taken to [facility] by ambulance  Had xrays  Imaging positive for CAM type femoral acetabular impingement Bilat  Not a recurrent problem   Quality of life: poor    Pain  Current pain ratin  At best pain ratin  At worst pain rating: 10  Quality: sharp  Relieving factors: rest and change in position (walking short distances)  Exacerbated by: transfers, bending    Progression: no change    Social Support  Steps to enter house: yes (1)  Lives in: multiple-level home (does not use stairs, uses cellar door and walks around to 1st floor door )  Lives with: parents (cousin)    Employment status: not working  Hand dominance: left      Diagnostic Tests  X-ray: abnormal  Treatments  Current treatment: medication and physical therapy  Patient Goals  Patient goals for therapy: decreased pain, increased motion, increased strength, improved balance and independence with ADLs/IADLs  Patient goal: walk w/o walker        Objective     Observations     Additional Observation Details  Pt ambulating with RW and fwd hunched gait, and mod antalgic gt  Palpation     Right   Tenderness of the gluteus nessa, gluteus medius, iliopsoas and TFL  Lumbar Screen  Lumbar range of motion within normal limits  Neurological Testing     Sensation     Hip   Left Hip   Intact: light touch    Right Hip   Intact: light touch    Active Range of Motion   Left Hip   Abduction: 37 degrees   External rotation (90/90): 31 degrees   Internal rotation (90/90): 18 degrees     Right Hip   Flexion: 72 degrees   Abduction: 33 degrees   External rotation (90/90): 37 degrees   Internal rotation (90/90): 18 degrees     Additional Active Range of Motion Details  R knee 0-125*    Strength/Myotome Testing     Left Hip   Planes of Motion   Flexion: 5  Abduction: 4+  Adduction: 5  External rotation: 4  Internal rotation: 4    Right Hip   Planes of Motion   Flexion: 4  Abduction: 4+  Adduction: 5  External rotation: 4  Internal rotation: 4    Tests     Right Hip   Positive Kalpana  Negative sign of the buttock                Precautions: mentally challenged, seizures, cleft palate      Re-eval Date1/7/20    Date 12/16/19       Visit Count 1       FOTO completed       Pain In        Pain Out              Manual         R LE distraction        PROM R hip                                  Date 12/16/19       [address] [facility] vs  Nustep NS L1 10'       HS stretch          Piriformis stretch        Hip flexor stretch         Hip abd/add         Glute sets           [facility]           90/90 SL abd SLRs            Bridges          TR/HR          Mini squats          Step ups fwd/lateral          Balance                    Modalities

## 2019-12-17 PROCEDURE — 97162 PT EVAL MOD COMPLEX 30 MIN: CPT

## 2019-12-24 ENCOUNTER — OFFICE VISIT (OUTPATIENT)
Dept: PHYSICAL THERAPY | Facility: CLINIC | Age: 44
End: 2019-12-24
Payer: MEDICARE

## 2019-12-24 DIAGNOSIS — M25.859 FEMORAL ACETABULAR IMPINGEMENT: ICD-10-CM

## 2019-12-24 DIAGNOSIS — M25.551 CHRONIC RIGHT HIP PAIN: Primary | ICD-10-CM

## 2019-12-24 DIAGNOSIS — G89.29 CHRONIC RIGHT HIP PAIN: Primary | ICD-10-CM
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
## 2019-12-24 DIAGNOSIS — R26.9 GAIT ABNORMALITY: ICD-10-CM

## 2019-12-24 PROCEDURE — 97110 THERAPEUTIC EXERCISES: CPT

## 2019-12-24 NOTE — PROGRESS NOTES
Daily Note     Today's date: 2019  Patient name: Lulu Yates  : 1975  MRN: [id number]  Referring provider: Jacob Ballesteros DO  Dx:   Encounter Diagnosis     ICD-10-CM    1  Chronic right hip pain M25 551     G89 29    2  Gait abnormality R26 9    3  Femoral acetabular impingement M25 859    4  Fall, initial encounter [address]  XXXA                   Subjective: PT RATES HIS PAIN AT 6-7/10 , HOWEVER , due to being mentally challenged it is diff to assess if pt understands pain scale  Pt reported his pain was not increased by ex program Pt states he is taking meds which help decrease pain  Objective: See treatment diary below      Assessment: Tolerated treatment well  Patient would benefit from continued PT  Needs vc's for proper execution of ex, pt demonstrates fair technique with ex, even with vc's  Pt ambulating with rw with steady gt  Plan: Continue per plan of care        Precautions: mentally challenged, seizures, cleft palate      Re-eval Date20    Date 19       Visit Count 1       FOTO completed       Pain In        Pain Out              Manual        R LE distraction        PROM R hip                                  Date 19      [address] [facility] vs  Nustep NS L1 10' [address] [facility] L1  10'      HS stretch    Seated 4x20"      Piriformis stretch        Hip flexor stretch         Hip abd/add         Leg press     70#  20x      Clamshells     L SL   20x      90/90 SL abd         SLRs      20x R      SAQS  2#  30X      Heel slides  R  20x      Add squeezes  Ball  20x      Hip abd w/TB  Green 20x3"      Bridges    20X      TR/HR    TRs foam  20x      Mini squats    20x      Step ups fwd/lateral          Balance                    Modalities

## 2019-12-26 ENCOUNTER — HOSPITAL ENCOUNTER (OUTPATIENT)
Dept: NEUROLOGY | Facility: HOSPITAL | Age: 44
Discharge: HOME | End: 2019-12-26
Payer: MEDICARE

## 2019-12-26 DIAGNOSIS — R56.9 SEIZURE-LIKE ACTIVITY (HCC): ICD-10-CM

## 2019-12-26 PROCEDURE — 95816 EEG AWAKE AND DROWSY: CPT | Performed by: PSYCHIATRY & NEUROLOGY

## 2019-12-26 PROCEDURE — 95819 EEG AWAKE AND ASLEEP: CPT

## 2019-12-31 ENCOUNTER — OFFICE VISIT (OUTPATIENT)
Dept: PHYSICAL THERAPY | Facility: CLINIC | Age: 44
End: 2019-12-31
Payer: MEDICARE

## 2019-12-31 DIAGNOSIS — R26.9 GAIT ABNORMALITY: ICD-10-CM

## 2019-12-31 DIAGNOSIS — M25.551 CHRONIC RIGHT HIP PAIN: Primary | ICD-10-CM

## 2019-12-31 DIAGNOSIS — M25.859 FEMORAL ACETABULAR IMPINGEMENT: ICD-10-CM

## 2019-12-31 DIAGNOSIS — G89.29 CHRONIC RIGHT HIP PAIN: Primary | ICD-10-CM

## 2019-12-31 PROCEDURE — 97110 THERAPEUTIC EXERCISES: CPT

## 2019-12-31 NOTE — PROGRESS NOTES
Daily Note     Today's date: 2019  Patient name: Elizabeth Pablo  : 1975  MRN: [id number]  Referring provider: Makayla Vargas DO  Dx:   Encounter Diagnosis     ICD-10-CM    1  Chronic right hip pain M25 551     G89 29    2  Gait abnormality R26 9    3  Femoral acetabular impingement M25 859                   Subjective: Pt states his R hip is feeling a little bit better      Objective: See treatment diary below      Assessment: Tolerated treatment well  Patient would benefit from continued PT Pt cont to amb w/rw but does not appear to be relying on it for support    Pt req vc's for ex and demonstrates fair technique with same  Pt notes he is able to move better after tx  Plan: Continue per plan of care        Precautions: mentally challenged, seizures, cleft palate      Re-eval Date20    Date 19     Visit Count 1 2 3     FOTO completed       Pain In        Pain Out              Manual       R LE distraction        PROM R hip        Ham stretch   4x30"                       Date 19     [address] [facility] vs  Nustep NS L1 10' [address] [facility] L1  10' NS L3 12'     HS stretch    Seated 4x20" manual     Piriformis stretch        Hip flexor stretch         Hip abd/add         Leg press     70#  20x 75#  30x     Clamshells     L SL   20x L  SL  20x     90/90 SL abd         SLRs      20x R 20x R     SAQS  2#  30X 2# 30x     Heel slides  R  20x 2#  30x     Add squeezes  Ball  20x Ball 30x     Hip abd w/TB  Green 20x3" Green 30x     Bridges    20X      TR/HR    TRs foam  20x TRs foam  30x     Mini squats    20x 20x     Standing march, abd, ext   30 ea                     Step ups fwd/lateral          Balance                    Modalities

## 2020-01-03 ENCOUNTER — OFFICE VISIT (OUTPATIENT)
Dept: PHYSICAL THERAPY | Facility: CLINIC | Age: 45
End: 2020-01-03
Payer: MEDICARE

## 2020-01-03 ENCOUNTER — OFFICE VISIT (OUTPATIENT)
Dept: NEUROLOGY | Facility: CLINIC | Age: 45
End: 2020-01-03
Payer: MEDICARE

## 2020-01-03 VITALS
BODY MASS INDEX: 29.29 KG/M2 | SYSTOLIC BLOOD PRESSURE: 122 MMHG | DIASTOLIC BLOOD PRESSURE: 82 MMHG | RESPIRATION RATE: 18 BRPM | HEIGHT: 73 IN | WEIGHT: 221 LBS | HEART RATE: 67 BPM

## 2020-01-03 DIAGNOSIS — R26.9 GAIT ABNORMALITY: ICD-10-CM

## 2020-01-03 DIAGNOSIS — M25.551 CHRONIC RIGHT HIP PAIN: Primary | ICD-10-CM

## 2020-01-03 DIAGNOSIS — Z98.2 S/P VENTRICULOPERITONEAL SHUNT: ICD-10-CM

## 2020-01-03 DIAGNOSIS — G89.29 CHRONIC RIGHT HIP PAIN: Primary | ICD-10-CM

## 2020-01-03 DIAGNOSIS — R56.9 SEIZURE-LIKE ACTIVITY (HCC): Primary | ICD-10-CM

## 2020-01-03 PROCEDURE — 97140 MANUAL THERAPY 1/> REGIONS: CPT | Performed by: PHYSICAL THERAPIST

## 2020-01-03 PROCEDURE — 97110 THERAPEUTIC EXERCISES: CPT | Performed by: PHYSICAL THERAPIST

## 2020-01-03 PROCEDURE — 99213 OFFICE O/P EST LOW 20 MIN: CPT | Performed by: PSYCHIATRY & NEUROLOGY

## 2020-01-03 NOTE — ASSESSMENT & PLAN NOTE
Has had 1 additional episodes since his last appointment  Again occurred while in bed  Described by the brother as generalized shaking and moaning and unresponsive but with no incontinence or tongue bite  His sleep-deprived EEG was a normal awake and drowsing study  Again, there is a suspicion here that these episodes are of a non epileptogenic origin  Will need to proceed further with additional testing, including extended/ambulatory EEG   --48 hour ambulatory EEG   --proceed to MRI brain (okayed by Neurosurgery)  --again no AED coverage at this time  --patient does not drive nor hold an active 's license

## 2020-01-03 NOTE — PROGRESS NOTES
Daily Note     Today's date: 1/3/2020  Patient name: Ryan Wilkinson  : 1975  MRN: [id number]  Referring provider: Cachorro Agustin DO  Dx:   Encounter Diagnosis     ICD-10-CM    1  Chronic right hip pain M25 551     G89 29    2  Gait abnormality R26 9                   Subjective: Pt offered no new complaints this date  Objective: See treatment diary below      Assessment: Pt with significant pain noted in the anterior quad and lateral hip during manual HS stretching this date; difficulty relaxing R LE during stretching also with verbal cues provided  He went through remainder of TE program well and without any complaints  Progression of skilled therapy need to address remaining gait dysfunction  Plan: Continue per plan of care        Precautions: mentally challenged, seizures, cleft palate  Re-eval Date20    Date 12/16/19 12/24 12/31 1/3    Visit Count 1 2 3 1    FOTO completed       Pain In        Pain Out              Manual  12/16 12/24 12/31 1/3    R LE distraction        PROM R hip        Ham stretch   4x30" R only  4 x 30"                       Date 12/16/19 12/24 12/31 1/3    [address] [facility] vs  Nustep NS L1 10' [address] [facility] L1  10' NS L3 12' NS L3  12 minutes     HS stretch    Seated 4x20" manual manual    Piriformis stretch        Hip flexor stretch         Hip abd/add         Leg press     70#  20x 75#  30x NP    Clamshells     L SL   20x L  SL  20x L SL   X 30     90/90 SL abd         SLRs      20x R 20x R 20x R     SAQS  2#  30X 2# 30x 2# x 30    Heel slides  R  20x 2#  30x 2# x 20     Add squeezes  Ball  20x Ball 30x Ball x 30     Hip abd w/TB  Green 20x3" Green 30x Green x 30     Bridges    20X      TR/HR    TRs foam  20x TRs foam  30x TR x 30     Mini squats    20x 20x NP    Standing march, abd, ext   30 ea X 30 ea                     Step ups fwd/lateral          Balance                    Modalities

## 2020-01-03 NOTE — LETTER
January 3, 2020     Chris Rather,   [facility] [address] [facility]    Patient: Satya Tay   YOB: 1975   Date of Visit: 1/3/2020       Dear Dr Pepito Stapleton: Thank you for referring Satya Tay to me for evaluation  Below are my notes for this consultation  If you have questions, please do not hesitate to call me  I look forward to following your patient along with you  Sincerely,        Jenn Luz MD        CC: No Recipients  Jenn Luz MD  1/3/2020  4:14 PM  Sign at close encounter  Patient ID: Satya Tay is a 38 y o  male  Assessment/Plan:    Seizure-like activity ([address] )  Has had 1 additional episodes since his last appointment  Again occurred while in bed  Described by the brother as generalized shaking and moaning and unresponsive but with no incontinence or tongue bite  His sleep-deprived EEG was a normal awake and drowsing study  Again, there is a suspicion here that these episodes are of a non epileptogenic origin  Will need to proceed further with additional testing, including extended/ambulatory EEG   --48 hour ambulatory EEG   --proceed to MRI brain (okayed by Neurosurgery)  --again no AED coverage at this time  --patient does not drive nor hold an active 's license  S/P ventriculoperitoneal shunt  Seen by Neurosurgery  Shunt appears to be functional       He will follow up after completion of his additional studies  Subjective:    HPI  Patient, 38years of age, returns to review the results of additional testing in view of seizure-like episodes which she has had  He was accompanied today by his brother, Nelson Lou, who was again the major supplier of history as patient is intellectually limited  After his initial evaluation, thought was that his episodes were of a non epileptogenic origin  Since last seen, patient has had 1 additional episode occurring approximately 2 weeks ago  That also occurred in bed  Brother witnessed the episode and described the patient as having generalized shaking and moaning and unresponsive but with no associated incontinence or tongue bite or other bodily injury  Apparent immediate return to full awareness  He did have his sleep-deprived EEG study performed and that was a normal awake and drowsing study  With his history of ventriculoperitoneal shunting, neurosurgery saw him  He is felt to have a functioning shot  Neurosurgery felt it was okay for him to move on to further evaluation with MRI brain      Past Medical History:   Diagnosis Date    Cleft hard palate     Seizures ([facility] [address] )      Past Surgical History:   Procedure Laterality Date    BRAIN SURGERY      CLEFT LIP REPAIR       Social History     Socioeconomic History    Marital status: Single     Spouse name: None    Number of children: None    Years of education: None    Highest education level: None   Occupational History    Occupation: disabled   Social Needs    Financial resource strain: None    Food insecurity:     Worry: None     Inability: None    Transportation needs:     Medical: None     Non-medical: None   Tobacco Use    Smoking status: Current Every Day Smoker     Packs/day: 0 50     Types: Cigarettes    Smokeless tobacco: Former User   Substance and Sexual Activity    Alcohol use: Yes     Comment: rare    Drug use: Never    Sexual activity: Not Currently   Lifestyle    Physical activity:     Days per week: None     Minutes per session: None    Stress: None   Relationships    Social connections:     Talks on phone: None     Gets together: None     Attends Oriental orthodox service: None     Active member of club or organization: None     Attends meetings of clubs or organizations: None     Relationship status: None    Intimate partner violence:     Fear of current or ex partner: None     Emotionally abused: None     Physically abused: None     Forced sexual activity: None   Other Topics Concern    None Social History Narrative    Disabled    Single    No children    Lives with his mother  Family History   Problem Relation Age of Onset    Hypertension Mother     COPD Mother     Seizures Father     Heart disease Father     Cancer Family      No Known Allergies    Current Outpatient Medications:     meloxicam (MOBIC) 15 mg tablet, Take 1 tablet (15 mg total) by mouth daily, Disp: 90 tablet, Rfl: 3    Objective:    Blood pressure 122/82, pulse 67, resp  rate 18, height 6' 1" (1 854 m), weight 100 kg (221 lb)  Physical Exam  Head normocephalic  Eyes nonicteric  No audible anterior neck bruits  Lungs clear to auscultation  Rhythm regular  GI (abdomen) soft nontender  Bowel sounds present  No significant lower extremity edema  Neurological Exam  Alert  Once again pleasantly interactive  Again speech impediment noted  Utilizing walker for ambulatory security  Cranial nerves 2-12 tested and grossly intact except for a left facial asymmetry, unchanged from his initial examination  No lateralized extremity weakness  Muscle stretch reflexes bilaterally 1 throughout the upper extremities, bilaterally 1+ at the knees 2+ at the right ankle and 2 at the left ankle  ROS:    Review of Systems   Constitutional: Negative  Negative for appetite change and fever  HENT: Negative  Negative for hearing loss, tinnitus, trouble swallowing and voice change  Eyes: Negative  Negative for photophobia and pain  Respiratory: Negative  Negative for shortness of breath  Cardiovascular: Negative  Negative for palpitations  Gastrointestinal: Negative  Negative for nausea and vomiting  Endocrine: Negative  Negative for cold intolerance and heat intolerance  Genitourinary: Negative  Negative for dysuria, frequency and urgency  Musculoskeletal: Positive for gait problem  Negative for myalgias and neck pain  Walks with walker   Skin: Negative  Negative for rash     Allergic/Immunologic: Negative  Neurological: Negative for dizziness, tremors, seizures, syncope, facial asymmetry, speech difficulty, weakness, light-headedness, numbness and headaches  Hematological: Negative  Does not bruise/bleed easily  Psychiatric/Behavioral: Negative  Negative for confusion, hallucinations and sleep disturbance  I personally reviewed the ROS as entered by the medical assistant  *Please note this document was created using voice recognition software and may contain sound-alike word errors  *

## 2020-01-06 ENCOUNTER — OFFICE VISIT (OUTPATIENT)
Dept: PHYSICAL THERAPY | Facility: CLINIC | Age: 45
End: 2020-01-06
Payer: MEDICARE

## 2020-01-06 DIAGNOSIS — M25.551 CHRONIC RIGHT HIP PAIN: Primary | ICD-10-CM

## 2020-01-06 DIAGNOSIS — G89.29 CHRONIC RIGHT HIP PAIN: Primary | ICD-10-CM

## 2020-01-06 DIAGNOSIS — R26.9 GAIT ABNORMALITY: ICD-10-CM

## 2020-01-06 PROCEDURE — 97140 MANUAL THERAPY 1/> REGIONS: CPT | Performed by: PHYSICAL THERAPIST

## 2020-01-06 PROCEDURE — 97110 THERAPEUTIC EXERCISES: CPT | Performed by: PHYSICAL THERAPIST

## 2020-01-06 NOTE — PROGRESS NOTES
Daily Note     Today's date: 2020  Patient name: Terence Moulton  : 1975  MRN: [id number]  Referring provider: Bindu Schaeffer DO  Dx:   Encounter Diagnosis     ICD-10-CM    1  Chronic right hip pain M25 551     G89 29    2  Gait abnormality R26 9                   Subjective: Pt reporting that he feels a little better but he is still having pain  Objective: See treatment diary below      Assessment: Pt with better tolerance to manual stretching this date, without increase in pain reported  However, PT notes remaining tightness in R HS and continued use of RW at this time  Need for continued therapy to address remaining deficits  Plan: Continue per plan of care        Precautions: mentally challenged, seizures, cleft palate  Re-eval Date20    Date 12/16/19 12/24 12/31 1/3 1/6   Visit Count 1 2 3 1 2   FOTO completed       Pain In        Pain Out              Manual  12/16 12/24 12/31 1/3 1/6   R LE distraction        PROM R hip        Ham stretch   4x30" R only  4 x 30"  R only  4 x 30"                      Date 12/16/19 12/24 12/31 1/3 1/6   [address] [facility] vs  Nustep NS L1 10' [address] [facility] L1  10' NS L3 12' NS L3  12 minutes  NS L3  12 minutes   HS stretch    Seated 4x20" manual manual Manual    Piriformis stretch        Hip flexor stretch         Hip abd/add         Leg press     70#  20x 75#  30x NP 75# x 30    Clamshells     L SL   20x L  SL  20x L SL   X 30  L SL   X 30    90/90 SL abd         SLRs      20x R 20x R 20x R  X 30 R    SAQS  2#  30X 2# 30x 2# x 30 2# x 30    Heel slides  R  20x 2#  30x 2# x 20  2# x 30    Add squeezes  Ball  20x Ball 30x Ball x 30  Ballx 20    Hip abd w/TB  Green 20x3" Green 30x Green x 30  Green x 30    Bridges    20X      TR/HR    TRs foam  20x TRs foam  30x TR x 30  TR x 30   Mini squats    20x 20x NP X 30    Standing march, abd, ext   30 ea X 30 ea  X 30 ea                    Step ups fwd/lateral          Balance                    Modalities

## 2020-01-14 ENCOUNTER — OFFICE VISIT (OUTPATIENT)
Dept: PHYSICAL THERAPY | Facility: CLINIC | Age: 45
End: 2020-01-14
Payer: MEDICARE

## 2020-01-14 DIAGNOSIS — R26.9 GAIT ABNORMALITY: ICD-10-CM

## 2020-01-14 DIAGNOSIS — M25.551 CHRONIC RIGHT HIP PAIN: Primary | ICD-10-CM

## 2020-01-14 DIAGNOSIS — M25.859 FEMORAL ACETABULAR IMPINGEMENT: ICD-10-CM

## 2020-01-14 DIAGNOSIS — G89.29 CHRONIC RIGHT HIP PAIN: Primary | ICD-10-CM

## 2020-01-14 PROCEDURE — 97110 THERAPEUTIC EXERCISES: CPT

## 2020-01-14 NOTE — PROGRESS NOTES
Daily Note     Today's date: 2020  Patient name: Fili Cisneros  : 1975  MRN: [id number]  Referring provider: Farzad Brito DO  Dx:   Encounter Diagnosis     ICD-10-CM    1  Chronic right hip pain M25 551     G89 29    2  Gait abnormality R26 9    3  Femoral acetabular impingement M25 859                   Subjective: Pt states his hip is feeling better but he still has pain which he rates at 6-7/10 R hip  Objective: See treatment diary below      Assessment: Tolerated treatment well  Patient exhibited good technique with therapeutic exercises and would benefit from continued PT  Performed amb w/o asst devices for 200 feet with steady gt and close sup  No LOB noted  Pt states he uses rw at home outside due to uneven ground  Plan: Progress treatment as tolerated         Precautions: mentally challenged, seizures, cleft palate  Re-eval Date20    Date        Visit Count 6       FOTO        Pain In 6-7       Pain Out              Manual         R LE distraction        PROM R hip        Ham stretch R 4x30"                         Date        [facility] vs  Nustep NS L3  12'       HS stretch          Piriformis stretch        Hip flexor stretch         Hip abd/add         Leg press    80#  30x       Clamshells    L SL  30x       90/90 SL abd         SLRs     30x R       SAQS        Heel slides 2# 30       Add squeezes Ball 30x5"       Hip abd w/TB Green 30x       Bridges   20x       TR/HR   Foam 30 TRs       Mini squats   20       Standing march, abd, ext 30 ea       Leg ext mach 33#  30x       Leg flex mach 33# 30x       Step ups fwd/lateral  Up R   Fwd 30x       GT w/o asst devices   5'                 Modalities

## 2020-01-15 ENCOUNTER — TELEPHONE (OUTPATIENT)
Dept: OTHER | Facility: OTHER | Age: 45
End: 2020-01-15

## 2020-01-15 ENCOUNTER — TELEPHONE (OUTPATIENT)
Dept: UROLOGY | Facility: MEDICAL CENTER | Age: 45
End: 2020-01-15

## 2020-01-15 ENCOUNTER — OFFICE VISIT (OUTPATIENT)
Dept: INTERNAL MEDICINE CLINIC | Facility: CLINIC | Age: 45
End: 2020-01-15
Payer: MEDICARE

## 2020-01-15 ENCOUNTER — HOSPITAL ENCOUNTER (OUTPATIENT)
Dept: ULTRASOUND IMAGING | Facility: HOSPITAL | Age: 45
Discharge: HOME | End: 2020-01-15
Attending: INTERNAL MEDICINE
Payer: MEDICARE

## 2020-01-15 VITALS
OXYGEN SATURATION: 99 % | WEIGHT: 225.6 LBS | HEIGHT: 73 IN | DIASTOLIC BLOOD PRESSURE: 82 MMHG | TEMPERATURE: 99 F | HEART RATE: 68 BPM | BODY MASS INDEX: 29.9 KG/M2 | SYSTOLIC BLOOD PRESSURE: 134 MMHG | RESPIRATION RATE: 18 BRPM

## 2020-01-15 DIAGNOSIS — M25.551 CHRONIC RIGHT HIP PAIN: Primary | ICD-10-CM

## 2020-01-15 DIAGNOSIS — N50.89 SCROTAL SWELLING: ICD-10-CM

## 2020-01-15 DIAGNOSIS — C62.12 MALIGNANT NEOPLASM OF DESCENDED LEFT TESTIS (HCC): ICD-10-CM

## 2020-01-15 DIAGNOSIS — E66.3 OVERWEIGHT (BMI 25.0-29.9): ICD-10-CM

## 2020-01-15 DIAGNOSIS — Z00.00 MEDICARE ANNUAL WELLNESS VISIT, INITIAL: ICD-10-CM
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
## 2020-01-15 DIAGNOSIS — N50.89 TESTICULAR MASS: Primary | ICD-10-CM

## 2020-01-15 DIAGNOSIS — R26.9 GAIT ABNORMALITY: ICD-10-CM

## 2020-01-15 DIAGNOSIS — G89.29 CHRONIC RIGHT HIP PAIN: Primary | ICD-10-CM

## 2020-01-15 DIAGNOSIS — M25.859 FEMORAL ACETABULAR IMPINGEMENT: ICD-10-CM

## 2020-01-15 PROCEDURE — 76870 US EXAM SCROTUM: CPT

## 2020-01-15 PROCEDURE — 99214 OFFICE O/P EST MOD 30 MIN: CPT | Performed by: INTERNAL MEDICINE

## 2020-01-15 PROCEDURE — G0438 PPPS, INITIAL VISIT: HCPCS | Performed by: INTERNAL MEDICINE

## 2020-01-15 NOTE — TELEPHONE ENCOUNTER
Called [phone] and received message " person you are calling voicemail box has not been set up yet "  Discussed patient  with Toro Womack  Plan to await US results then call patient in the morning

## 2020-01-15 NOTE — PROGRESS NOTES
Assessment and Plan:     Problem List Items Addressed This Visit     None      Visit Diagnoses     Chronic right hip pain    -  Primary    Fall, subsequent encounter        Gait abnormality        Femoral acetabular impingement        Scrotal swelling        Medicare annual wellness visit, initial        Overweight (BMI 25 0-29  9)        BMI 29 0-29 9,adult               Preventive health issues were discussed with patient, and age appropriate screening tests were ordered as noted in patient's After Visit Summary  Personalized health advice and appropriate referrals for health education or preventive services given if needed, as noted in patient's After Visit Summary       History of Present Illness:     Patient presents for Medicare Annual Wellness visit    Patient Care Team:  Laney Butts DO as PCP - General (Internal Medicine)     Problem List:     Patient Active Problem List   Diagnosis    Mentally challenged    Tobacco abuse    Seizure-like activity ([facility] [address] )    S/P ventriculoperitoneal shunt      Past Medical and Surgical History:     Past Medical History:   Diagnosis Date    Cleft hard palate     Seizures ([facility] [address] )      Past Surgical History:   Procedure Laterality Date    BRAIN SURGERY      CLEFT LIP REPAIR        Family History:     Family History   Problem Relation Age of Onset    Hypertension Mother     COPD Mother     Seizures Father     Heart disease Father     Cancer Family       Social History:     Social History     Socioeconomic History    Marital status: Single     Spouse name: None    Number of children: None    Years of education: None    Highest education level: None   Occupational History    Occupation: disabled   Social Needs    Financial resource strain: None    Food insecurity:     Worry: None     Inability: None    Transportation needs:     Medical: None     Non-medical: None   Tobacco Use    Smoking status: Current Every Day Smoker     Packs/day: 0 50     Types: Cigarettes  Smokeless tobacco: Former User   Substance and Sexual Activity    Alcohol use: Yes     Comment: rare    Drug use: Never    Sexual activity: Not Currently   Lifestyle    Physical activity:     Days per week: None     Minutes per session: None    Stress: None   Relationships    Social connections:     Talks on phone: None     Gets together: None     Attends [employer] service: None     Active member of club or organization: None     Attends meetings of clubs or organizations: None     Relationship status: None    Intimate partner violence:     Fear of current or ex partner: None     Emotionally abused: None     Physically abused: None     Forced sexual activity: None   Other Topics Concern    None   Social History Narrative    Disabled    Single    No children    Lives with his mother  Medications and Allergies:     Current Outpatient Medications   Medication Sig Dispense Refill    meloxicam (MOBIC) 15 mg tablet Take 1 tablet (15 mg total) by mouth daily 90 tablet 3     No current facility-administered medications for this visit  No Known Allergies   Immunizations:     Immunization History   Administered Date(s) Administered    Influenza, injectable, quadrivalent, preservative free 0 5 mL 10/28/2019    Tdap 12/10/2019      Health Maintenance: There are no preventive care reminders to display for this patient  Topic Date Due    Pneumococcal Vaccine: Pediatrics (0 to 5 Years) and At-Risk Patients (6 to 59 Years) (1 of 1 - PPSV23) 11/10/1982      Medicare Health Risk Assessment:     /82 (BP Location: Left arm, Patient Position: Sitting, Cuff Size: Standard)   Pulse 68   Temp 99 °F (37 2 °C)   Resp 18   Ht 6' 1" (1 854 m)   Wt 102 kg (225 lb 9 6 oz)   SpO2 99%   BMI 29 76 kg/m²      Mee Sandoval is here for his Initial Wellness visit  Health Risk Assessment:   Patient rates overall health as fair  Patient feels that their physical health rating is same   Eyesight was rated as same  Hearing was rated as same  Patient feels that their emotional and mental health rating is same  Pain experienced in the last 7 days has been some  Patient's pain rating has been 3/10  Patient states that he has experienced no weight loss or gain in last 6 months  Depression Screening:   PHQ-2 Score: 0      Fall Risk Screening: In the past year, patient has experienced: no history of falling in past year      Home Safety:  Patient does not have trouble with stairs inside or outside of their home  Patient has working smoke alarms and has working carbon monoxide detector  Home safety hazards include: none  Nutrition:   Current diet is Regular  Medications:   Patient is not currently taking any over-the-counter supplements  Patient is able to manage medications  Activities of Daily Living (ADLs)/Instrumental Activities of Daily Living (IADLs):   Walk and transfer into and out of bed and chair?: Yes  Dress and groom yourself?: Yes    Bathe or shower yourself?: No    Feed yourself? Yes  Do your laundry/housekeeping?: No  Manage your money, pay your bills and track your expenses?: No  Make your own meals?: No    Do your own shopping?: No    ADL comments: BROTHER HELPS    Previous Hospitalizations:   Any hospitalizations or ED visits within the last 12 months?: No      Advance Care Planning:   Living will: No    Durable POA for healthcare: No    Advanced directive: No    Five wishes given: Yes      Cognitive Screening:   Provider or family/friend/caregiver concerned regarding cognition?: Yes    Cognition Comments: Has mental disability and caretakers accompany pt       PREVENTIVE SCREENINGS      Cardiovascular Screening:    General: Screening Current    Due for: Lipid Panel      Diabetes Screening:     General: Screening Current    Due for: Blood Glucose      Colorectal Cancer Screening:     General: Screening Not Indicated      Prostate Cancer Screening:    General: Screening Not Indicated Osteoporosis Screening:    General: Screening Not Indicated      Abdominal Aortic Aneurysm (AAA) Screening:    Risk factors include: tobacco use        General: Screening Not Indicated      Lung Cancer Screening:     General: Screening Not Indicated      Hepatitis C Screening:    General: Screening Not Indicated    Other Counseling Topics:   Car/seat belt/driving safety and calcium and vitamin D intake and regular weightbearing exercise  A/P: Doing well and no more falls  Denies depression and feels safe at home  Diverse diet  Doesn't drive, but uses seat belts  No living will or POA  Information give for pt to review to his caretaker  No DME or referrals needed today  RTC in one year for medicare wellness       Jona Rangel, DO

## 2020-01-15 NOTE — PROGRESS NOTES
BMI Counseling: Body mass index is 29 76 kg/m²  The BMI is above normal  Nutrition recommendations include decreasing portion sizes, increasing intake of lean protein and reducing intake of saturated and trans fat  Exercise recommendations include moderate physical activity 150 minutes/week  No pharmacotherapy was ordered  Assessment/Plan:  Problem List Items Addressed This Visit     None      Visit Diagnoses     Chronic right hip pain    -  Primary    Fall, subsequent encounter        Gait abnormality        Femoral acetabular impingement        Scrotal swelling        Relevant Orders    US scrotum and testicles    Ambulatory referral to Urology    Medicare annual wellness visit, initial        Overweight (BMI 25 0-29  9)        BMI 29 0-29 9,adult               Diagnoses and all orders for this visit:    Chronic right hip pain    Fall, subsequent encounter    Gait abnormality    Femoral acetabular impingement    Scrotal swelling  -     US scrotum and testicles; Future  -     Ambulatory referral to Urology; Future    Medicare annual wellness visit, initial    Overweight (BMI 25 0-29  9)    BMI 29 0-29 9,adult        No problem-specific Assessment & Plan notes found for this encounter  A/P: Doing ok with slow improvement in the hip and will continue with the current treatment  Concern is the scrotum  Can't believe he has no pain  Suspect hydrocele and concern is the distention causing mechanical compression  Will need an US and to see urology ASAP  Called local urology since caretaker doesn't want to drive far  Will get the US ASAP  Continue current treatment and RTC one week  Subjective:      Patient ID: Radha Chang is a 38 y o  male  WM RTC with his caretaker for f/u hip pain of uncertain etiology, labs, and sz  Started PT and placed on NSAID's  Reports slow improvement  Did not get his labs done yet  Tolerating the meds  NO sz activity   Now reports scrotal swelling that started since his last visit about a month ago  Minimal pain per the pt, but has some mental disability  No problems with urination  NO trauma  No fever or chills  The following portions of the patient's history were reviewed and updated as appropriate:   He has a past medical history of Cleft hard palate and Seizures ([address] )  ,  does not have any pertinent problems on file  ,   has a past surgical history that includes Brain surgery and Cleft lip repair  ,  family history includes COPD in his mother; Cancer in his family; Heart disease in his father; Hypertension in his mother; Seizures in his father  ,   reports that he has been smoking cigarettes  He has been smoking about 0 50 packs per day  He has quit using smokeless tobacco  He reports that he drinks alcohol  He reports that he does not use drugs  ,  has No Known Allergies     Current Outpatient Medications   Medication Sig Dispense Refill    meloxicam (MOBIC) 15 mg tablet Take 1 tablet (15 mg total) by mouth daily 90 tablet 3     No current facility-administered medications for this visit  Review of Systems   Constitutional: Positive for activity change  Negative for chills, diaphoresis, fatigue and fever  Respiratory: Negative for cough, chest tightness, shortness of breath and wheezing  Cardiovascular: Negative for chest pain, palpitations and leg swelling  Gastrointestinal: Negative for abdominal pain, constipation, diarrhea, nausea and vomiting  Genitourinary: Negative for difficulty urinating, dysuria and frequency  Scrotal swelling  Musculoskeletal: Positive for arthralgias and gait problem  Negative for joint swelling and myalgias  Neurological: Negative for dizziness, seizures, syncope, weakness, light-headedness and headaches  Psychiatric/Behavioral: Negative for confusion and dysphoric mood  The patient is not nervous/anxious          PHQ-9 Depression Screening    PHQ-9:    Frequency of the following problems over the past two weeks: Little interest or pleasure in doing things:  0 - not at all  Feeling down, depressed, or hopeless:  0 - not at all  PHQ-2 Score:  0        Objective:  Vitals:    01/15/20 1450   BP: 134/82   BP Location: Left arm   Patient Position: Sitting   Cuff Size: Standard   Pulse: 68   Resp: 18   Temp: 99 °F (37 2 °C)   SpO2: 99%   Weight: 102 kg (225 lb 9 6 oz)   Height: 6' 1" (1 854 m)     Body mass index is 29 76 kg/m²  Physical Exam   Constitutional: He is oriented to person, place, and time  He appears well-developed and well-nourished  No distress  HENT:   Head: Normocephalic and atraumatic  Mouth/Throat: Oropharynx is clear and moist    Eyes: Pupils are equal, round, and reactive to light  Conjunctivae and EOM are normal    Cardiovascular: Normal rate, regular rhythm and normal heart sounds  Pulmonary/Chest: Effort normal and breath sounds normal  No respiratory distress  He has no wheezes  He has no rales  Genitourinary:   Genitourinary Comments: Scrotum is hard, firm and the size of a soft ball with testicles outlined due to the swelling  No lesions  NO penile d/c  Testicles feel normal     Musculoskeletal: He exhibits tenderness  He exhibits no edema or deformity  Neurological: He is alert and oriented to person, place, and time  Psychiatric: He has a normal mood and affect  His behavior is normal  Judgment and thought content normal    Nursing note and vitals reviewed

## 2020-01-15 NOTE — TELEPHONE ENCOUNTER
Triage New Patient  Received call from Dr Glory Jorge office in regard to need for Urology appointment ASAP  Patient is being sent for STAT Ultrasound of scrotum  Medicare insurance  No previouis urologist  ELIEL TIERNEY for testing  Prefers the [facility] ([facility]) office    pateint can be reached at [phone]  Thank you

## 2020-01-15 NOTE — PATIENT INSTRUCTIONS
Medicare Preventive Visit Patient Instructions  Thank you for completing your Welcome to Medicare Visit or Medicare Annual Wellness Visit today  Your next wellness visit will be due in one year (1/15/2021)  The screening/preventive services that you may require over the next 5-10 years are detailed below  Some tests may not apply to you based off risk factors and/or age  Screening tests ordered at today's visit but not completed yet may show as past due  Also, please note that scanned in results may not display below  Preventive Screenings:  Service Recommendations Previous Testing/Comments   Colorectal Cancer Screening  · Colonoscopy    · Fecal Occult Blood Test (FOBT)/Fecal Immunochemical Test (FIT)  · Fecal DNA/Cologuard Test  · Flexible Sigmoidoscopy Age: 54-65 years old   Colonoscopy: every 10 years (May be performed more frequently if at higher risk)  OR  FOBT/FIT: every 1 year  OR  Cologuard: every 3 years  OR  Sigmoidoscopy: every 5 years  Screening may be recommended earlier than age 48 if at higher risk for colorectal cancer  Also, an individualized decision between you and your healthcare provider will decide whether screening between the ages of 74-80 would be appropriate   Colonoscopy: Not on file  FOBT/FIT: Not on file  Cologuard: Not on file  Sigmoidoscopy: Not on file         Prostate Cancer Screening Individualized decision between patient and health care provider in men between ages of 53-78   Medicare will cover every 12 months beginning on the day after your 50th birthday PSA: No results in last 5 years     Screening Not Indicated     Hepatitis C Screening Once for adults born between [facility]  More frequently in patients at high risk for Hepatitis C Hep C Antibody: Not on file       Diabetes Screening 1-2 times per year if you're at risk for diabetes or have pre-diabetes Fasting glucose: 101 mg/dL   A1C: 5 7 %    Screening Current   Cholesterol Screening Once every 5 years if you don't have a lipid disorder  May order more often based on risk factors  Lipid panel: Not on file          Other Preventive Screenings Covered by Medicare:  1  Abdominal Aortic Aneurysm (AAA) Screening: covered once if your at risk  You're considered to be at risk if you have a family history of AAA or a male between the age of 73-68 who smoking at least 100 cigarettes in your lifetime  2  Lung Cancer Screening: covers low dose CT scan once per year if you meet all of the following conditions: (1) Age 50-69; (2) No signs or symptoms of lung cancer; (3) Current smoker or have quit smoking within the last 15 years; (4) You have a tobacco smoking history of at least 30 pack years (packs per day x number of years you smoked); (5) You get a written order from a healthcare provider  3  Glaucoma Screening: covered annually if you're considered high risk: (1) You have diabetes OR (2) Family history of glaucoma OR (3)  aged 48 and older OR (3)  American aged 72 and older  3  Osteoporosis Screening: covered every 2 years if you meet one of the following conditions: (1) Have a vertebral abnormality; (2) On glucocorticoid therapy for more than 3 months; (3) Have primary hyperparathyroidism; (4) On osteoporosis medications and need to assess response to drug therapy  5  HIV Screening: covered annually if you're between the age of 12-76  Also covered annually if you are younger than 13 and older than 72 with risk factors for HIV infection  For pregnant patients, it is covered up to 3 times per pregnancy      Immunizations:  Immunization Recommendations   Influenza Vaccine Annual influenza vaccination during flu season is recommended for all persons aged >= 6 months who do not have contraindications   Pneumococcal Vaccine (Prevnar and Pneumovax)  * Prevnar = PCV13  * Pneumovax = PPSV23 Adults 25-60 years old: 1-3 doses may be recommended based on certain risk factors  Adults 72 years old: Prevnar (PCV13) vaccine recommended followed by Pneumovax (PPSV23) vaccine  If already received PPSV23 since turning 65, then PCV13 recommended at least one year after PPSV23 dose  Hepatitis B Vaccine 3 dose series if at intermediate or high risk (ex: diabetes, end stage renal disease, liver disease)   Tetanus (Td) Vaccine - COST NOT COVERED BY MEDICARE PART B Following completion of primary series, a booster dose should be given every 10 years to maintain immunity against tetanus  Td may also be given as tetanus wound prophylaxis  Tdap Vaccine - COST NOT COVERED BY MEDICARE PART B Recommended at least once for all adults  For pregnant patients, recommended with each pregnancy  Shingles Vaccine (Shingrix) - COST NOT COVERED BY MEDICARE PART B  2 shot series recommended in those aged 48 and above     Health Maintenance Due:  There are no preventive care reminders to display for this patient  Immunizations Due:      Topic Date Due    Pneumococcal Vaccine: Pediatrics (0 to 5 Years) and At-Risk Patients (6 to 59 Years) (1 of 1 - PPSV23) 11/10/1982     Advance Directives   What are advance directives? Advance directives are legal documents that state your wishes and plans for medical care  These plans are made ahead of time in case you lose your ability to make decisions for yourself  Advance directives can apply to any medical decision, such as the treatments you want, and if you want to donate organs  What are the types of advance directives? There are many types of advance directives, and each state has rules about how to use them  You may choose a combination of any of the following:  · Living will: This is a written record of the treatment you want  You can also choose which treatments you do not want, which to limit, and which to stop at a certain time  This includes surgery, medicine, IV fluid, and tube feedings  · Durable power of  for healthcare [facility]):   This is a written record that states who you want to make healthcare choices for you when you are unable to make them for yourself  This person, called a proxy, is usually a family member or a friend  You may choose more than 1 proxy  · Do not resuscitate (DNR) order:  A DNR order is used in case your heart stops beating or you stop breathing  It is a request not to have certain forms of treatment, such as CPR  A DNR order may be included in other types of advance directives  · Medical directive: This covers the care that you want if you are in a coma, near death, or unable to make decisions for yourself  You can list the treatments you want for each condition  Treatment may include pain medicine, surgery, blood transfusions, dialysis, IV or tube feedings, and a ventilator (breathing machine)  · Values history: This document has questions about your views, beliefs, and how you feel and think about life  This information can help others choose the care that you would choose  Why are advance directives important? An advance directive helps you control your care  Although spoken wishes may be used, it is better to have your wishes written down  Spoken wishes can be misunderstood, or not followed  Treatments may be given even if you do not want them  An advance directive may make it easier for your family to make difficult choices about your care  Cigarette Smoking and Your Health   Risks to your health if you smoke:  Nicotine and other chemicals found in tobacco damage every cell in your body  Even if you are a light smoker, you have an increased risk for cancer, heart disease, and lung disease  If you are pregnant or have diabetes, smoking increases your risk for complications  Benefits to your health if you stop smoking:   · You decrease respiratory symptoms such as coughing, wheezing, and shortness of breath  · You reduce your risk for cancers of the lung, mouth, throat, kidney, bladder, pancreas, stomach, and cervix   If you already have cancer, you increase the benefits of chemotherapy  You also reduce your risk for cancer returning or a second cancer from developing  · You reduce your risk for heart disease, blood clots, heart attack, and stroke  · You reduce your risk for lung infections, and diseases such as pneumonia, asthma, chronic bronchitis, and emphysema  · Your circulation improves  More oxygen can be delivered to your body  If you have diabetes, you lower your risk for complications, such as kidney, artery, and eye diseases  You also lower your risk for nerve damage  Nerve damage can lead to amputations, poor vision, and blindness  · You improve your body's ability to heal and to fight infections  For more information and support to stop smoking:   · [employer]  Phone: 2- 340 - 603-8206  Web Address: [employer]  Weight Management   Why it is important to manage your weight:  Being overweight increases your risk of health conditions such as heart disease, high blood pressure, type 2 diabetes, and certain types of cancer  It can also increase your risk for osteoarthritis, sleep apnea, and other respiratory problems  Aim for a slow, steady weight loss  Even a small amount of weight loss can lower your risk of health problems  How to lose weight safely:  A safe and healthy way to lose weight is to eat fewer calories and get regular exercise  You can lose up about 1 pound a week by decreasing the number of calories you eat by 500 calories each day  Healthy meal plan for weight management:  A healthy meal plan includes a variety of foods, contains fewer calories, and helps you stay healthy  A healthy meal plan includes the following:  · Eat whole-grain foods more often  A healthy meal plan should contain fiber  Fiber is the part of grains, fruits, and vegetables that is not broken down by your body  Whole-grain foods are healthy and provide extra fiber in your diet   Some examples of whole-grain foods are whole-wheat breads and pastas, oatmeal, brown rice, and bulgur  · Eat a variety of vegetables every day  Include dark, leafy greens such as spinach, kale, jonh greens, and mustard greens  Eat yellow and orange vegetables such as carrots, sweet potatoes, and winter squash  · Eat a variety of fruits every day  Choose fresh or canned fruit (canned in its own juice or light syrup) instead of juice  Fruit juice has very little or no fiber  · Eat low-fat dairy foods  Drink fat-free (skim) milk or 1% milk  Eat fat-free yogurt and low-fat cottage cheese  Try low-fat cheeses such as mozzarella and other reduced-fat cheeses  · Choose meat and other protein foods that are low in fat  Choose beans or other legumes such as split peas or lentils  Choose fish, skinless poultry (chicken or turkey), or lean cuts of red meat (beef or pork)  Before you cook meat or poultry, cut off any visible fat  · Use less fat and oil  Try baking foods instead of frying them  Add less fat, such as margarine, sour cream, regular salad dressing and mayonnaise to foods  Eat fewer high-fat foods  Some examples of high-fat foods include french fries, doughnuts, ice cream, and cakes  · Eat fewer sweets  Limit foods and drinks that are high in sugar  This includes candy, cookies, regular soda, and sweetened drinks  Exercise:  Exercise at least 30 minutes per day on most days of the week  Some examples of exercise include walking, biking, dancing, and swimming  You can also fit in more physical activity by taking the stairs instead of the elevator or parking farther away from stores  Ask your healthcare provider about the best exercise plan for you  © Copyright [employer] 2018 Information is for End User's use only and may not be sold, redistributed or otherwise used for commercial purposes   All illustrations and images included in CareNotes® are the copyrighted property of A D A M , Inc  or [facility] Preventive Visit Patient Instructions  Thank you for completing your Welcome to Medicare Visit or Medicare Annual Wellness Visit today  Your next wellness visit will be due in one year (1/15/2021)  The screening/preventive services that you may require over the next 5-10 years are detailed below  Some tests may not apply to you based off risk factors and/or age  Screening tests ordered at today's visit but not completed yet may show as past due  Also, please note that scanned in results may not display below  Preventive Screenings:  Service Recommendations Previous Testing/Comments   Colorectal Cancer Screening  · Colonoscopy    · Fecal Occult Blood Test (FOBT)/Fecal Immunochemical Test (FIT)  · Fecal DNA/Cologuard Test  · Flexible Sigmoidoscopy Age: 54-65 years old   Colonoscopy: every 10 years (May be performed more frequently if at higher risk)  OR  FOBT/FIT: every 1 year  OR  Cologuard: every 3 years  OR  Sigmoidoscopy: every 5 years  Screening may be recommended earlier than age 48 if at higher risk for colorectal cancer  Also, an individualized decision between you and your healthcare provider will decide whether screening between the ages of 74-80 would be appropriate  Colonoscopy: Not on file  FOBT/FIT: Not on file  Cologuard: Not on file  Sigmoidoscopy: Not on file         Prostate Cancer Screening Individualized decision between patient and health care provider in men between ages of 53-78   Medicare will cover every 12 months beginning on the day after your 50th birthday PSA: No results in last 5 years     Screening Not Indicated     Hepatitis C Screening Once for adults born between [facility]  More frequently in patients at high risk for Hepatitis C Hep C Antibody: Not on file       Diabetes Screening 1-2 times per year if you're at risk for diabetes or have pre-diabetes Fasting glucose: 101 mg/dL   A1C: 5 7 %    Screening Current   Cholesterol Screening Once every 5 years if you don't have a lipid disorder   May order more often based on risk factors  Lipid panel: Not on file          Other Preventive Screenings Covered by Medicare:  6  Abdominal Aortic Aneurysm (AAA) Screening: covered once if your at risk  You're considered to be at risk if you have a family history of AAA or a male between the age of 73-68 who smoking at least 100 cigarettes in your lifetime  7  Lung Cancer Screening: covers low dose CT scan once per year if you meet all of the following conditions: (1) Age 50-69; (2) No signs or symptoms of lung cancer; (3) Current smoker or have quit smoking within the last 15 years; (4) You have a tobacco smoking history of at least 30 pack years (packs per day x number of years you smoked); (5) You get a written order from a healthcare provider  8  Glaucoma Screening: covered annually if you're considered high risk: (1) You have diabetes OR (2) Family history of glaucoma OR (3)  aged 48 and older OR (3)  American aged 72 and older  5  Osteoporosis Screening: covered every 2 years if you meet one of the following conditions: (1) Have a vertebral abnormality; (2) On glucocorticoid therapy for more than 3 months; (3) Have primary hyperparathyroidism; (4) On osteoporosis medications and need to assess response to drug therapy  10  HIV Screening: covered annually if you're between the age of 12-76  Also covered annually if you are younger than 13 and older than 72 with risk factors for HIV infection  For pregnant patients, it is covered up to 3 times per pregnancy      Immunizations:  Immunization Recommendations   Influenza Vaccine Annual influenza vaccination during flu season is recommended for all persons aged >= 6 months who do not have contraindications   Pneumococcal Vaccine (Prevnar and Pneumovax)  * Prevnar = PCV13  * Pneumovax = PPSV23 Adults 25-60 years old: 1-3 doses may be recommended based on certain risk factors  Adults 72 years old: Prevnar (PCV13) vaccine recommended followed by Pneumovax (PPSV23) vaccine  If already received PPSV23 since turning 65, then PCV13 recommended at least one year after PPSV23 dose  Hepatitis B Vaccine 3 dose series if at intermediate or high risk (ex: diabetes, end stage renal disease, liver disease)   Tetanus (Td) Vaccine - COST NOT COVERED BY MEDICARE PART B Following completion of primary series, a booster dose should be given every 10 years to maintain immunity against tetanus  Td may also be given as tetanus wound prophylaxis  Tdap Vaccine - COST NOT COVERED BY MEDICARE PART B Recommended at least once for all adults  For pregnant patients, recommended with each pregnancy  Shingles Vaccine (Shingrix) - COST NOT COVERED BY MEDICARE PART B  2 shot series recommended in those aged 48 and above     Health Maintenance Due:  There are no preventive care reminders to display for this patient  Immunizations Due:      Topic Date Due    Pneumococcal Vaccine: Pediatrics (0 to 5 Years) and At-Risk Patients (6 to 59 Years) (1 of 1 - PPSV23) 11/10/1982     Advance Directives   What are advance directives? Advance directives are legal documents that state your wishes and plans for medical care  These plans are made ahead of time in case you lose your ability to make decisions for yourself  Advance directives can apply to any medical decision, such as the treatments you want, and if you want to donate organs  What are the types of advance directives? There are many types of advance directives, and each state has rules about how to use them  You may choose a combination of any of the following:  · Living will: This is a written record of the treatment you want  You can also choose which treatments you do not want, which to limit, and which to stop at a certain time  This includes surgery, medicine, IV fluid, and tube feedings  · Durable power of  for healthcare [facility]):   This is a written record that states who you want to make healthcare choices for you when you are unable to make them for yourself  This person, called a proxy, is usually a family member or a friend  You may choose more than 1 proxy  · Do not resuscitate (DNR) order:  A DNR order is used in case your heart stops beating or you stop breathing  It is a request not to have certain forms of treatment, such as CPR  A DNR order may be included in other types of advance directives  · Medical directive: This covers the care that you want if you are in a coma, near death, or unable to make decisions for yourself  You can list the treatments you want for each condition  Treatment may include pain medicine, surgery, blood transfusions, dialysis, IV or tube feedings, and a ventilator (breathing machine)  · Values history: This document has questions about your views, beliefs, and how you feel and think about life  This information can help others choose the care that you would choose  Why are advance directives important? An advance directive helps you control your care  Although spoken wishes may be used, it is better to have your wishes written down  Spoken wishes can be misunderstood, or not followed  Treatments may be given even if you do not want them  An advance directive may make it easier for your family to make difficult choices about your care  Cigarette Smoking and Your Health   Risks to your health if you smoke:  Nicotine and other chemicals found in tobacco damage every cell in your body  Even if you are a light smoker, you have an increased risk for cancer, heart disease, and lung disease  If you are pregnant or have diabetes, smoking increases your risk for complications  Benefits to your health if you stop smoking:   · You decrease respiratory symptoms such as coughing, wheezing, and shortness of breath  · You reduce your risk for cancers of the lung, mouth, throat, kidney, bladder, pancreas, stomach, and cervix  If you already have cancer, you increase the benefits of chemotherapy   You also reduce your risk for cancer returning or a second cancer from developing  · You reduce your risk for heart disease, blood clots, heart attack, and stroke  · You reduce your risk for lung infections, and diseases such as pneumonia, asthma, chronic bronchitis, and emphysema  · Your circulation improves  More oxygen can be delivered to your body  If you have diabetes, you lower your risk for complications, such as kidney, artery, and eye diseases  You also lower your risk for nerve damage  Nerve damage can lead to amputations, poor vision, and blindness  · You improve your body's ability to heal and to fight infections  For more information and support to stop smoking:   · [employer]  Phone: 8- 641 - 510-3109  Web Address: [employer]  Weight Management   Why it is important to manage your weight:  Being overweight increases your risk of health conditions such as heart disease, high blood pressure, type 2 diabetes, and certain types of cancer  It can also increase your risk for osteoarthritis, sleep apnea, and other respiratory problems  Aim for a slow, steady weight loss  Even a small amount of weight loss can lower your risk of health problems  How to lose weight safely:  A safe and healthy way to lose weight is to eat fewer calories and get regular exercise  You can lose up about 1 pound a week by decreasing the number of calories you eat by 500 calories each day  Healthy meal plan for weight management:  A healthy meal plan includes a variety of foods, contains fewer calories, and helps you stay healthy  A healthy meal plan includes the following:  · Eat whole-grain foods more often  A healthy meal plan should contain fiber  Fiber is the part of grains, fruits, and vegetables that is not broken down by your body  Whole-grain foods are healthy and provide extra fiber in your diet  Some examples of whole-grain foods are whole-wheat breads and pastas, oatmeal, brown rice, and bulgur    · Eat a variety of vegetables every day  Include dark, leafy greens such as spinach, kale, jonh greens, and mustard greens  Eat yellow and orange vegetables such as carrots, sweet potatoes, and winter squash  · Eat a variety of fruits every day  Choose fresh or canned fruit (canned in its own juice or light syrup) instead of juice  Fruit juice has very little or no fiber  · Eat low-fat dairy foods  Drink fat-free (skim) milk or 1% milk  Eat fat-free yogurt and low-fat cottage cheese  Try low-fat cheeses such as mozzarella and other reduced-fat cheeses  · Choose meat and other protein foods that are low in fat  Choose beans or other legumes such as split peas or lentils  Choose fish, skinless poultry (chicken or turkey), or lean cuts of red meat (beef or pork)  Before you cook meat or poultry, cut off any visible fat  · Use less fat and oil  Try baking foods instead of frying them  Add less fat, such as margarine, sour cream, regular salad dressing and mayonnaise to foods  Eat fewer high-fat foods  Some examples of high-fat foods include french fries, doughnuts, ice cream, and cakes  · Eat fewer sweets  Limit foods and drinks that are high in sugar  This includes candy, cookies, regular soda, and sweetened drinks  Exercise:  Exercise at least 30 minutes per day on most days of the week  Some examples of exercise include walking, biking, dancing, and swimming  You can also fit in more physical activity by taking the stairs instead of the elevator or parking farther away from stores  Ask your healthcare provider about the best exercise plan for you  © Copyright [employer] 2018 Information is for End User's use only and may not be sold, redistributed or otherwise used for commercial purposes   All illustrations and images included in CareNotes® are the copyrighted property of A D A M , Inc  or Jaciel Smiley  Weight Management   AMBULATORY CARE:   Why it is important to manage your weight:  Being overweight increases your risk of health conditions such as heart disease, high blood pressure, type 2 diabetes, and certain types of cancer  It can also increase your risk for osteoarthritis, sleep apnea, and other respiratory problems  Aim for a slow, steady weight loss  Even a small amount of weight loss can lower your risk of health problems  How to lose weight safely:  A safe and healthy way to lose weight is to eat fewer calories and get regular exercise  You can lose up about 1 pound a week by decreasing the number of calories you eat by 500 calories each day  You can decrease calories by eating smaller portion sizes or by cutting out high-calorie foods  Read labels to find out how many calories are in the foods you eat  You can also burn calories with exercise such as walking, swimming, or biking  You will be more likely to keep weight off if you make these changes part of your lifestyle  Healthy meal plan for weight management:  A healthy meal plan includes a variety of foods, contains fewer calories, and helps you stay healthy  A healthy meal plan includes the following:  · Eat whole-grain foods more often  A healthy meal plan should contain fiber  Fiber is the part of grains, fruits, and vegetables that is not broken down by your body  Whole-grain foods are healthy and provide extra fiber in your diet  Some examples of whole-grain foods are whole-wheat breads and pastas, oatmeal, brown rice, and bulgur  · Eat a variety of vegetables every day  Include dark, leafy greens such as spinach, kale, jonh greens, and mustard greens  Eat yellow and orange vegetables such as carrots, sweet potatoes, and winter squash  · Eat a variety of fruits every day  Choose fresh or canned fruit (canned in its own juice or light syrup) instead of juice  Fruit juice has very little or no fiber  · Eat low-fat dairy foods  Drink fat-free (skim) milk or 1% milk  Eat fat-free yogurt and low-fat cottage cheese  Try low-fat cheeses such as mozzarella and other reduced-fat cheeses  · Choose meat and other protein foods that are low in fat  Choose beans or other legumes such as split peas or lentils  Choose fish, skinless poultry (chicken or turkey), or lean cuts of red meat (beef or pork)  Before you cook meat or poultry, cut off any visible fat  · Use less fat and oil  Try baking foods instead of frying them  Add less fat, such as margarine, sour cream, regular salad dressing and mayonnaise to foods  Eat fewer high-fat foods  Some examples of high-fat foods include french fries, doughnuts, ice cream, and cakes  · Eat fewer sweets  Limit foods and drinks that are high in sugar  This includes candy, cookies, regular soda, and sweetened drinks  Ways to decrease calories:   · Eat smaller portions  ¨ Use a small plate with smaller servings  ¨ Do not eat second helpings  ¨ When you eat at a restaurant, ask for a box and place half of your meal in the box before you eat  ¨ Share an entrée with someone else  · Replace high-calorie snacks with healthy, low-calorie snacks  ¨ Choose fresh fruit, vegetables, fat-free rice cakes, or air-popped popcorn instead of potato chips, nuts, or chocolate  ¨ Choose water or calorie-free drinks instead of soda or sweetened drinks  · Eat regular meals  Skipping meals can lead to overeating later in the day  Eat a healthy snack in place of a meal if you do not have time to eat a regular meal      · Do not shop for groceries when you are hungry  You may be more likely to make unhealthy food choices  Take a grocery list of healthy foods and shop after you have eaten  Exercise:  Exercise at least 30 minutes per day on most days of the week  Some examples of exercise include walking, biking, dancing, and swimming  You can also fit in more physical activity by taking the stairs instead of the elevator or parking farther away from stores   Ask your healthcare provider about the best exercise plan for you  Other things to consider as you try to lose weight:   · Be aware of situations that may give you the urge to overeat, such as eating while watching television  Find ways to avoid these situations  For example, read a book, go for a walk, or do crafts  · Meet with a weight loss support group or friends who are also trying to lose weight  This may help you stay motivated to continue working on your weight loss goals  © 2017 [address] [employer] Information is for End User's use only and may not be sold, redistributed or otherwise used for commercial purposes  All illustrations and images included in CareNotes® are the copyrighted property of A D A M [employer]  or Karthik Gómez  The above information is an  only  It is not intended as medical advice for individual conditions or treatments  Talk to your doctor, nurse or pharmacist before following any medical regimen to see if it is safe and effective for you  Low Fat Diet   AMBULATORY CARE:   A low-fat diet  is an eating plan that is low in total fat, unhealthy fat, and cholesterol  You may need to follow a low-fat diet if you have trouble digesting or absorbing fat  You may also need to follow this diet if you have high cholesterol  You can also lower your cholesterol by increasing the amount of fiber in your diet  Soluble fiber is a type of fiber that helps to decrease cholesterol levels  Different types of fat in food:   · Limit unhealthy fats  A diet that is high in cholesterol, saturated fat, and trans fat may cause unhealthy cholesterol levels  Unhealthy cholesterol levels increase your risk of heart disease  ¨ Cholesterol:  Limit intake of cholesterol to less than 200 mg per day  Cholesterol is found in meat, eggs, and dairy  ¨ Saturated fat:  Limit saturated fat to less than 7% of your total daily calories  Ask your dietitian how many calories you need each day  Saturated fat is found in butter, cheese, ice cream, whole milk, and palm oil  Saturated fat is also found in meat, such as beef, pork, chicken skin, and processed meats  Processed meats include sausage, hot dogs, and bologna  ¨ Trans fat:  Avoid trans fat as much as possible  Trans fat is used in fried and baked foods  Foods that say trans fat free on the label may still have up to 0 5 grams of trans fat per serving  · Include healthy fats  Replace foods that are high in saturated and trans fat with foods high in healthy fats  This may help to decrease high cholesterol levels  ¨ Monounsaturated fats: These are found in avocados, nuts, and vegetable oils, such as olive, canola, and sunflower oil  ¨ Polyunsaturated fats: These can be found in vegetable oils, such as soybean or corn oil  Omega-3 fats can help to decrease the risk of heart disease  Omega-3 fats are found in fish, such as salmon, herring, trout, and tuna  Omega-3 fats can also be found in plant foods, such as walnuts, flaxseed, soybeans, and canola oil    Foods to limit or avoid:   · Grains:      ¨ Snacks that are made with partially hydrogenated oils, such as chips, regular crackers, and butter-flavored popcorn    ¨ High-fat baked goods, such as biscuits, croissants, doughnuts, pies, cookies, and pastries    · Dairy:      ¨ Whole milk, 2% milk, and yogurt and ice cream made with whole milk    ¨ Half and half creamer, heavy cream, and whipping cream    ¨ Cheese, cream cheese, and sour cream    · Meats and proteins:      ¨ High-fat cuts of meat (T-bone steak, regular hamburger, and ribs)    ¨ Fried meat, poultry (turkey and chicken), and fish    ¨ Poultry (chicken and turkey) with skin    ¨ Cold cuts (salami or bologna), hot dogs, au, and sausage    ¨ Whole eggs and egg yolks    · Vegetables and fruits with added fat:      ¨ Fried vegetables or vegetables in butter or high-fat sauces, such as cream or cheese sauces    ¨ Fried fruit or fruit served with butter or cream    · Fats:      ¨ Butter, stick margarine, and shortening    ¨ Coconut, palm oil, and palm kernel oil  Foods to include:   · Grains:      ¨ Whole-grain breads, cereals, pasta, and brown rice    ¨ Low-fat crackers and pretzels    · Vegetables and fruits:      ¨ Fresh, frozen, or canned vegetables (no salt or low-sodium)    ¨ Fresh, frozen, dried, or canned fruit (canned in light syrup or fruit juice)    ¨ Avocado    · Low-fat dairy products:      ¨ Nonfat (skim) or 1% milk    ¨ Nonfat or low-fat cheese, yogurt, and cottage cheese    · Meats and proteins:      ¨ Chicken or turkey with no skin    ¨ Baked or broiled fish    ¨ Lean beef and pork (loin, round, extra lean hamburger)    ¨ Beans and peas, unsalted nuts, soy products    ¨ Egg whites and substitutes    ¨ Seeds and nuts    · Fats:      ¨ Unsaturated oil, such as canola, olive, peanut, soybean, or sunflower oil    ¨ Soft or liquid margarine and vegetable oil spread    ¨ Low-fat salad dressing  Other ways to decrease fat:   · Read food labels before you buy foods  Choose foods that have less than 30% of calories from fat  Choose low-fat or fat-free dairy products  Remember that fat free does not mean calorie free  These foods still contain calories, and too many calories can lead to weight gain  · Trim fat from meat and avoid fried food  Trim all visible fat from meat before you cook it  Remove the skin from poultry  Do not reza meat, fish, or poultry  Bake, roast, boil, or broil these foods instead  Avoid fried foods  Eat a baked potato instead of [place] fries  Steam vegetables instead of sautéing them in butter  · Add less fat to foods  Use imitation au bits on salads and baked potatoes instead of regular au bits  Use fat-free or low-fat salad dressings instead of regular dressings  Use low-fat or nonfat butter-flavored topping instead of regular butter or margarine on popcorn and other foods    Ways to decrease fat in recipes:  Replace high-fat ingredients with low-fat or nonfat ones  This may cause baked goods to be drier than usual  You may need to use nonfat cooking spray on pans to prevent food from sticking  You also may need to change the amount of other ingredients, such as water, in the recipe  Try the following:  · Use low-fat or light margarine instead of regular margarine or shortening  · Use lean ground turkey breast or chicken, or lean ground beef (less than 5% fat) instead of hamburger  · Add 1 teaspoon of canola oil to 8 ounces of skim milk instead of using cream or half and half  · Use grated zucchini, carrots, or apples in breads instead of coconut  · Use blenderized, low-fat cottage cheese, plain tofu, or low-fat ricotta cheese instead of cream cheese  · Use 1 egg white and 1 teaspoon of canola oil, or use ¼ cup (2 ounces) of fat-free egg substitute instead of a whole egg  · Replace half of the oil that is called for in a recipe with applesauce when you bake  Use 3 tablespoons of cocoa powder and 1 tablespoon of canola oil instead of a square of baking chocolate  How to increase fiber:  Eat enough high-fiber foods to get 20 to 30 grams of fiber every day  Slowly increase your fiber intake to avoid stomach cramps, gas, and other problems  · Eat 3 ounces of whole-grain foods each day  An ounce is about 1 slice of bread  Eat whole-grain breads, such as whole-wheat bread  Whole wheat, whole-wheat flour, or other whole grains should be listed as the first ingredient on the food label  Replace white flour with whole-grain flour or use half of each in recipes  Whole-grain flour is heavier than white flour, so you may have to add more yeast or baking powder  · Eat a high-fiber cereal for breakfast   Oatmeal is a good source of soluble fiber  Look for cereals that have bran or fiber in the name  Choose whole-grain products, such as brown rice, barley, and whole-wheat pasta       · Eat more beans, peas, and lentils  For example, add beans to soups or salads  Eat at least 5 cups of fruits and vegetables each day  Eat fruits and vegetables with the peel because the peel is high in fiber  © 2017 [address] [employer] Information is for End User's use only and may not be sold, redistributed or otherwise used for commercial purposes  All illustrations and images included in CareNotes® are the copyrighted property of A D A M , Inc  or Karthik Gómez  The above information is an  only  It is not intended as medical advice for individual conditions or treatments  Talk to your doctor, nurse or pharmacist before following any medical regimen to see if it is safe and effective for you  Heart Healthy Diet   AMBULATORY CARE:   A heart healthy diet  is an eating plan low in total fat, unhealthy fats, and sodium (salt)  A heart healthy diet helps decrease your risk for heart disease and stroke  Limit the amount of fat you eat to 25% to 35% of your total daily calories  Limit sodium to less than 2,300 mg each day  Healthy fats:  Healthy fats can help improve cholesterol levels  The risk for heart disease is decreased when cholesterol levels are normal  Choose healthy fats, such as the following:  · Unsaturated fat  is found in foods such as soybean, canola, olive, corn, and safflower oils  It is also found in soft tub margarine that is made with liquid vegetable oil  · Omega-3 fat  is found in certain fish, such as salmon, tuna, and trout, and in walnuts and flaxseed  Unhealthy fats:  Unhealthy fats can cause unhealthy cholesterol levels in your blood and increase your risk of heart disease  Limit unhealthy fats, such as the following:  · Cholesterol  is found in animal foods, such as eggs and lobster, and in dairy products made from whole milk  Limit cholesterol to less than 300 milligrams (mg) each day   You may need to limit cholesterol to 200 mg each day if you have heart disease  · Saturated fat  is found in meats, such as au and hamburger  It is also found in chicken or turkey skin, whole milk, and butter  Limit saturated fat to less than 7% of your total daily calories  Limit saturated fat to less than 6% if you have heart disease or are at increased risk for it  · Trans fat  is found in packaged foods, such as potato chips and cookies  It is also in hard margarine, some fried foods, and shortening  Avoid trans fats as much as possible    Heart healthy foods and drinks to include:  Ask your dietitian or healthcare provider how many servings to have from each of the following food groups:  · Grains:      ¨ Whole-wheat breads, cereals, and pastas, and brown rice    ¨ Low-fat, low-sodium crackers and chips    · Vegetables:      ¨ Broccoli, green beans, green peas, and spinach    ¨ Collards, kale, and lima beans    ¨ Carrots, sweet potatoes, tomatoes, and peppers    ¨ Canned vegetables with no salt added    · Fruits:      ¨ Bananas, peaches, pears, and pineapple    ¨ Grapes, raisins, and dates    ¨ Oranges, tangerines, grapefruit, orange juice, and grapefruit juice    ¨ Apricots, mangoes, melons, and papaya    ¨ Raspberries and strawberries    ¨ Canned fruit with no added sugar    · Low-fat dairy products:      ¨ Nonfat (skim) milk, 1% milk, and low-fat almond, cashew, or soy milks fortified with calcium    ¨ Low-fat cheese, regular or frozen yogurt, and cottage cheese    · Meats and proteins , such as lean cuts of beef and pork (loin, leg, round), skinless chicken and turkey, legumes, soy products, egg whites, and nuts  Foods and drinks to limit or avoid:  Ask your dietitian or healthcare provider about these and other foods that are high in unhealthy fat, sodium, and sugar:  · Snack or packaged foods , such as frozen dinners, cookies, macaroni and cheese, and cereals with more than 300 mg of sodium per serving    · Canned or dry mixes  for cakes, soups, sauces, or gravies    · Vegetables with added sodium , such as instant potatoes, vegetables with added sauces, or regular canned vegetables    · Other foods high in sodium , such as ketchup, barbecue sauce, salad dressing, pickles, olives, soy sauce, and miso    · High-fat dairy foods  such as whole or 2% milk, cream cheese, or sour cream, and cheeses     · High-fat protein foods  such as high-fat cuts of beef (T-bone steaks, ribs), chicken or turkey with skin, and organ meats, such as liver    · Cured or smoked meats , such as hot dogs, au, and sausage    · Unhealthy fats and oils , such as butter, stick margarine, shortening, and cooking oils such as coconut or palm oil    · Food and drinks high in sugar , such as soft drinks (soda), sports drinks, sweetened tea, candy, cake, cookies, pies, and doughnuts  Other diet guidelines to follow:   · Eat more foods containing omega-3 fats  Eat fish high in omega-3 fats at least 2 times a week  · Limit alcohol  Too much alcohol can damage your heart and raise your blood pressure  Women should limit alcohol to 1 drink a day  Men should limit alcohol to 2 drinks a day  A drink of alcohol is 12 ounces of beer, 5 ounces of wine, or 1½ ounces of liquor  · Choose low-sodium foods  High-sodium foods can lead to high blood pressure  Add little or no salt to food you prepare  Use herbs and spices in place of salt  · Eat more fiber  to help lower cholesterol levels  Eat at least 5 servings of fruits and vegetables each day  Eat 3 ounces of whole-grain foods each day  Legumes (beans) are also a good source of fiber  Lifestyle guidelines:   · Do not smoke  Nicotine and other chemicals in cigarettes and cigars can cause lung and heart damage  Ask your healthcare provider for information if you currently smoke and need help to quit  E-cigarettes or smokeless tobacco still contain nicotine  Talk to your healthcare provider before you use these products       · Exercise regularly  to help you maintain a healthy weight and improve your blood pressure and cholesterol levels  Ask your healthcare provider about the best exercise plan for you  Do not start an exercise program without asking your healthcare provider  Follow up with your healthcare provider as directed:  Write down your questions so you remember to ask them during your visits  © 2017 [address] [employer] Information is for End User's use only and may not be sold, redistributed or otherwise used for commercial purposes  All illustrations and images included in CareNotes® are the copyrighted property of [employer]  or Karthik Gómez  The above information is an  only  It is not intended as medical advice for individual conditions or treatments  Talk to your doctor, nurse or pharmacist before following any medical regimen to see if it is safe and effective for you

## 2020-01-16 ENCOUNTER — TELEPHONE (OUTPATIENT)
Dept: NEUROLOGY | Facility: CLINIC | Age: 45
End: 2020-01-16

## 2020-01-16 ENCOUNTER — HOSPITAL ENCOUNTER (OUTPATIENT)
Dept: MRI IMAGING | Facility: HOSPITAL | Age: 45
Discharge: HOME | End: 2020-01-16
Payer: MEDICARE

## 2020-01-16 DIAGNOSIS — R56.9 SEIZURE-LIKE ACTIVITY (HCC): ICD-10-CM

## 2020-01-16 DIAGNOSIS — N50.89 TESTICULAR MASS: Primary | ICD-10-CM

## 2020-01-16 PROCEDURE — 70553 MRI BRAIN STEM W/O & W/DYE: CPT

## 2020-01-16 PROCEDURE — A9585 GADOBUTROL INJECTION: HCPCS | Performed by: PSYCHIATRY & NEUROLOGY

## 2020-01-16 RX ORDER — CEFAZOLIN SODIUM 2 G/50ML
2000 SOLUTION INTRAVENOUS ONCE
Status: CANCELLED | OUTPATIENT
Start: 2020-01-20 | End: 2020-01-16

## 2020-01-16 RX ADMIN — GADOBUTROL 10 ML: 604.72 INJECTION INTRAVENOUS at 16:17

## 2020-01-16 NOTE — TELEPHONE ENCOUNTER
Contacted and spoke with patient's mother, Chris Brand, to schedule an appointment for her son  Offered today but patient has an physical therapy appointment at 300 pm  Patient scheduled tomorrow at 300 pm with Milton Jarvis in the [facility]

## 2020-01-16 NOTE — TELEPHONE ENCOUNTER
I lm on the home phone for them to make appt ASAP with urology-I also left the urologist telephone number for them to call and set this appt up ([phone])

## 2020-01-16 NOTE — TELEPHONE ENCOUNTER
Patient come in tomorrow Friday 1/17 at 3pm slot [place] to see me for urgent visit  Needs AFP and HCG labs  Will schedule him for OR Monday 1/20 @ [facility] with Dr Heavenly Goldman  Can get consent day of per Dr Heavenly Goldman

## 2020-01-16 NOTE — TELEPHONE ENCOUNTER
Immediate Findings on a STAT ultrasound done at [address] [facility]  Left voice message for Dr Raul Barton to call Health Call at [phone] [facility] [address]  Sent message via [facility] to Dr Raul Barton at 2010

## 2020-01-16 NOTE — TELEPHONE ENCOUNTER
I called urology and spoke to [facility] said the nurse tried to call them today but a voice mail box was not set up yet  I gave her the home number-they will try to call him today to set up an appt for him today

## 2020-01-16 NOTE — TELEPHONE ENCOUNTER
Took call from [facility] in [facility]  She states that MRI order for patient is not signed  Advised that order is electronically signed by Dr Garcia Other  They state they are unable to see this   I have faxed this per their request

## 2020-01-17 ENCOUNTER — OFFICE VISIT (OUTPATIENT)
Dept: UROLOGY | Facility: CLINIC | Age: 45
End: 2020-01-17
Payer: MEDICARE

## 2020-01-17 VITALS
HEART RATE: 68 BPM | WEIGHT: 223 LBS | DIASTOLIC BLOOD PRESSURE: 80 MMHG | SYSTOLIC BLOOD PRESSURE: 132 MMHG | BODY MASS INDEX: 29.42 KG/M2

## 2020-01-17 DIAGNOSIS — N50.89 SCROTAL SWELLING: ICD-10-CM

## 2020-01-17 PROCEDURE — 99204 OFFICE O/P NEW MOD 45 MIN: CPT | Performed by: PHYSICIAN ASSISTANT

## 2020-01-17 NOTE — TELEPHONE ENCOUNTER
I scheduled patients surgery for Monday 1/20/20 at [facility] with Dr Newton Zaragoza  I sent his paperwork to [address] office to give him at his appt this afternoon with tone  Patient does not need an PATs  Patient to stop BT now

## 2020-01-19 NOTE — PROGRESS NOTES
1/17/2020      No chief complaint on file  Assessment and Plan    38 y o  male managed by NEW PATIENT    1  Left testicle mass  - afp, hcg ordered  - left radical orchiectomy scheduled monday 1/20/2020 with Dr Judy Murray  - will need staging imaging postop if this represents a malignancy  To OR Monday 1/20/20  History of Present Illness  Gunnar Bahena is a 38 y o  male here for evaluation of  New patient urgent visit for new left testicular mass  Patient with h/o developmental delay, accompanied by mother but he gives his own history primarily  By patient report enlargement began about 1 month ago without infectious or traumatic precipitant  Mother first took note at that time when someone else made a comment about patient's pants not fitting right and could "see a bulge down there " He has to wear loose fitting sweatpants due to the swelling  US performed two days ago shows large ([id number]) left testicule with heterogenous echotexture with suspicion of ilfiltrating process/malignancy possible testicular lymphoma, there is normal doppler flow to both testicles and a normal right testicle  Patient does report some dull heaviness but denies any discomfort or pain  Denies any difficulty urinating  No night sweats weight loss nausea vomiting unexplained fevers  He does report decreased appetite/portion intake in the past month  He does report recent onset of questionable seizure activity  No headaches or trauma  He underwent MRI brain yesterday which is negative for any mass/metastastic disease  Has h/o hydrocephalus s/p  shunt from birth with no prior seizure disorder  Review of Systems   Constitutional: Negative  Respiratory: Negative  Cardiovascular: Negative  Genitourinary: Negative for decreased urine volume, difficulty urinating, dysuria, flank pain, frequency, hematuria and urgency  Musculoskeletal: Negative             Past Medical History  Past Medical History:   Diagnosis Date    Cleft hard palate     Seizures (HCC)      Past Social History  Past Surgical History:   Procedure Laterality Date    BRAIN SURGERY      CLEFT LIP REPAIR       Social History     Tobacco Use   Smoking Status Current Every Day Smoker    Packs/day: 0 50    Types: Cigarettes   Smokeless Tobacco Former User     Past Family History  Family History   Problem Relation Age of Onset    Hypertension Mother     COPD Mother     Seizures Father     Heart disease Father     Cancer Family      Past Social history  Social History     Socioeconomic History    Marital status: Single     Spouse name: Not on file    Number of children: Not on file    Years of education: Not on file    Highest education level: Not on file   Occupational History    Occupation: disabled   Social Needs    Financial resource strain: Not on file    Food insecurity:     Worry: Not on file     Inability: Not on file    Transportation needs:     Medical: Not on file     Non-medical: Not on file   Tobacco Use    Smoking status: Current Every Day Smoker     Packs/day: 0 50     Types: Cigarettes    Smokeless tobacco: Former User   Substance and Sexual Activity    Alcohol use: Yes     Comment: rare    Drug use: Never    Sexual activity: Not Currently   Lifestyle    Physical activity:     Days per week: Not on file     Minutes per session: Not on file    Stress: Not on file   Relationships    Social connections:     Talks on phone: Not on file     Gets together: Not on file     Attends Yazidi service: Not on file     Active member of club or organization: Not on file     Attends meetings of clubs or organizations: Not on file     Relationship status: Not on file    Intimate partner violence:     Fear of current or ex partner: Not on file     Emotionally abused: Not on file     Physically abused: Not on file     Forced sexual activity: Not on file   Other Topics Concern    Not on file   Social History Narrative    Disabled    Single No children    Lives with his mother  Current Medications  Current Outpatient Medications   Medication Sig Dispense Refill    meloxicam (MOBIC) 15 mg tablet Take 1 tablet (15 mg total) by mouth daily 90 tablet 3     No current facility-administered medications for this visit  Allergies  No Known Allergies      The following portions of the patient's history were reviewed and updated as appropriate: allergies, current medications, past medical history, past social history, past surgical history and problem list       Vitals  Vitals:    01/17/20 1509   BP: 132/80   Pulse: 68   Weight: 101 kg (223 lb)       Physical Exam   Constitutional: He is oriented to person, place, and time  He appears well-developed and well-nourished  No distress  Pleasant, cooperative, evidence of global developmental delay  Accompanied by mother though patient provides primary history   HENT:   Head: Normocephalic and atraumatic  Pulmonary/Chest: Effort normal    Genitourinary:   Genitourinary Comments: Buried suspected uncircumsized penis unable to retract to visualize the urethral meatus  Normal soft smooth right testicle  Left testicle massively enlarged and firm throughout  Left testicle within the scrotum about the size of a canteloupe  Skin overall normal    Musculoskeletal: He exhibits no edema  Neurological: He is alert and oriented to person, place, and time  Gait normal    Skin: Skin is warm and dry  He is not diaphoretic  Psychiatric: He has a normal mood and affect  His speech is normal and behavior is normal    Nursing note and vitals reviewed  Results  No results found for this or any previous visit (from the past 1 hour(s))  ]  No results found for: PSA  Lab Results   Component Value Date    CALCIUM 9 4 10/29/2019    K 3 7 10/29/2019    CO2 24 10/29/2019     10/29/2019    BUN 10 10/29/2019    CREATININE 0 85 10/29/2019     Lab Results   Component Value Date    WBC 6 30 10/29/2019    HGB 14 6 10/29/2019    HCT 43 0 10/29/2019    MCV 89 10/29/2019     10/29/2019         Orders  No orders of the defined types were placed in this encounter

## 2020-01-19 NOTE — H&P (VIEW-ONLY)
1/17/2020      No chief complaint on file  Assessment and Plan    38 y o  male managed by NEW PATIENT    1  Left testicle mass  - afp, hcg ordered  - left radical orchiectomy scheduled monday 1/20/2020 with Dr Wilbur Feng  - will need staging imaging postop if this represents a malignancy  To OR Monday 1/20/20  History of Present Illness  Terence Moulton is a 38 y o  male here for evaluation of  New patient urgent visit for new left testicular mass  Patient with h/o developmental delay, accompanied by mother but he gives his own history primarily  By patient report enlargement began about 1 month ago without infectious or traumatic precipitant  Mother first took note at that time when someone else made a comment about patient's pants not fitting right and could "see a bulge down there " He has to wear loose fitting sweatpants due to the swelling  US performed two days ago shows large ([id number]) left testicule with heterogenous echotexture with suspicion of ilfiltrating process/malignancy possible testicular lymphoma, there is normal doppler flow to both testicles and a normal right testicle  Patient does report some dull heaviness but denies any discomfort or pain  Denies any difficulty urinating  No night sweats weight loss nausea vomiting unexplained fevers  He does report decreased appetite/portion intake in the past month  He does report recent onset of questionable seizure activity  No headaches or trauma  He underwent MRI brain yesterday which is negative for any mass/metastastic disease  Has h/o hydrocephalus s/p  shunt from birth with no prior seizure disorder  Review of Systems   Constitutional: Negative  Respiratory: Negative  Cardiovascular: Negative  Genitourinary: Negative for decreased urine volume, difficulty urinating, dysuria, flank pain, frequency, hematuria and urgency  Musculoskeletal: Negative             Past Medical History  Past Medical History:   Diagnosis Date    Cleft hard palate     Seizures (HCC)      Past Social History  Past Surgical History:   Procedure Laterality Date    BRAIN SURGERY      CLEFT LIP REPAIR       Social History     Tobacco Use   Smoking Status Current Every Day Smoker    Packs/day: 0 50    Types: Cigarettes   Smokeless Tobacco Former User     Past Family History  Family History   Problem Relation Age of Onset    Hypertension Mother     COPD Mother     Seizures Father     Heart disease Father     Cancer Family      Past Social history  Social History     Socioeconomic History    Marital status: Single     Spouse name: Not on file    Number of children: Not on file    Years of education: Not on file    Highest education level: Not on file   Occupational History    Occupation: disabled   Social Needs    Financial resource strain: Not on file    Food insecurity:     Worry: Not on file     Inability: Not on file    Transportation needs:     Medical: Not on file     Non-medical: Not on file   Tobacco Use    Smoking status: Current Every Day Smoker     Packs/day: 0 50     Types: Cigarettes    Smokeless tobacco: Former User   Substance and Sexual Activity    Alcohol use: Yes     Comment: rare    Drug use: Never    Sexual activity: Not Currently   Lifestyle    Physical activity:     Days per week: Not on file     Minutes per session: Not on file    Stress: Not on file   Relationships    Social connections:     Talks on phone: Not on file     Gets together: Not on file     Attends Sabianism service: Not on file     Active member of club or organization: Not on file     Attends meetings of clubs or organizations: Not on file     Relationship status: Not on file    Intimate partner violence:     Fear of current or ex partner: Not on file     Emotionally abused: Not on file     Physically abused: Not on file     Forced sexual activity: Not on file   Other Topics Concern    Not on file   Social History Narrative    Disabled    Single No children    Lives with his mother  Current Medications  Current Outpatient Medications   Medication Sig Dispense Refill    meloxicam (MOBIC) 15 mg tablet Take 1 tablet (15 mg total) by mouth daily 90 tablet 3     No current facility-administered medications for this visit  Allergies  No Known Allergies      The following portions of the patient's history were reviewed and updated as appropriate: allergies, current medications, past medical history, past social history, past surgical history and problem list       Vitals  Vitals:    01/17/20 1509   BP: 132/80   Pulse: 68   Weight: 101 kg (223 lb)       Physical Exam   Constitutional: He is oriented to person, place, and time  He appears well-developed and well-nourished  No distress  Pleasant, cooperative, evidence of global developmental delay  Accompanied by mother though patient provides primary history   HENT:   Head: Normocephalic and atraumatic  Pulmonary/Chest: Effort normal    Genitourinary:   Genitourinary Comments: Buried suspected uncircumsized penis unable to retract to visualize the urethral meatus  Normal soft smooth right testicle  Left testicle massively enlarged and firm throughout  Left testicle within the scrotum about the size of a canteloupe  Skin overall normal    Musculoskeletal: He exhibits no edema  Neurological: He is alert and oriented to person, place, and time  Gait normal    Skin: Skin is warm and dry  He is not diaphoretic  Psychiatric: He has a normal mood and affect  His speech is normal and behavior is normal    Nursing note and vitals reviewed  Results  No results found for this or any previous visit (from the past 1 hour(s))  ]  No results found for: PSA  Lab Results   Component Value Date    CALCIUM 9 4 10/29/2019    K 3 7 10/29/2019    CO2 24 10/29/2019     10/29/2019    BUN 10 10/29/2019    CREATININE 0 85 10/29/2019     Lab Results   Component Value Date    WBC 6 30 10/29/2019    HGB 14 6 10/29/2019    HCT 43 0 10/29/2019    MCV 89 10/29/2019     10/29/2019         Orders  No orders of the defined types were placed in this encounter

## 2020-01-20 ENCOUNTER — ANESTHESIA EVENT (OUTPATIENT)
Dept: PERIOP | Facility: HOSPITAL | Age: 45
End: 2020-01-20
Payer: MEDICARE

## 2020-01-20 ENCOUNTER — TELEPHONE (OUTPATIENT)
Dept: UROLOGY | Facility: AMBULATORY SURGERY CENTER | Age: 45
End: 2020-01-20

## 2020-01-20 ENCOUNTER — HOSPITAL ENCOUNTER (OUTPATIENT)
Facility: HOSPITAL | Age: 45
Setting detail: OUTPATIENT SURGERY
Discharge: HOME | End: 2020-01-20
Attending: UROLOGY | Admitting: UROLOGY
Payer: MEDICARE

## 2020-01-20 ENCOUNTER — ANESTHESIA (OUTPATIENT)
Dept: PERIOP | Facility: HOSPITAL | Age: 45
End: 2020-01-20
Payer: MEDICARE

## 2020-01-20 VITALS
DIASTOLIC BLOOD PRESSURE: 75 MMHG | SYSTOLIC BLOOD PRESSURE: 125 MMHG | TEMPERATURE: 98.9 F | HEART RATE: 84 BPM | OXYGEN SATURATION: 94 % | RESPIRATION RATE: 16 BRPM

## 2020-01-20 DIAGNOSIS — N50.89 TESTICULAR MASS: ICD-10-CM

## 2020-01-20 PROCEDURE — 88309 TISSUE EXAM BY PATHOLOGIST: CPT | Performed by: PATHOLOGY

## 2020-01-20 PROCEDURE — 54530 REMOVAL OF TESTIS: CPT

## 2020-01-20 PROCEDURE — 88305 TISSUE EXAM BY PATHOLOGIST: CPT | Performed by: PATHOLOGY

## 2020-01-20 PROCEDURE — 55150 REMOVAL OF SCROTUM: CPT

## 2020-01-20 PROCEDURE — 55150 REMOVAL OF SCROTUM: CPT | Performed by: UROLOGY

## 2020-01-20 PROCEDURE — 54530 REMOVAL OF TESTIS: CPT | Performed by: UROLOGY

## 2020-01-20 RX ORDER — LIDOCAINE HYDROCHLORIDE 10 MG/ML
INJECTION, SOLUTION EPIDURAL; INFILTRATION; INTRACAUDAL; PERINEURAL AS NEEDED
Status: DISCONTINUED | OUTPATIENT
Start: 2020-01-20 | End: 2020-01-20 | Stop reason: SURG

## 2020-01-20 RX ORDER — ALBUTEROL SULFATE 2.5 MG/3ML
2.5 SOLUTION RESPIRATORY (INHALATION) ONCE AS NEEDED
Status: DISCONTINUED | OUTPATIENT
Start: 2020-01-20 | End: 2020-01-20 | Stop reason: HOSPADM

## 2020-01-20 RX ORDER — CEPHALEXIN 500 MG/1
500 CAPSULE ORAL EVERY 6 HOURS SCHEDULED
Qty: 12 CAPSULE | Refills: 0 | Status: SHIPPED | OUTPATIENT
Start: 2020-01-20 | End: 2020-01-23

## 2020-01-20 RX ORDER — PROPOFOL 10 MG/ML
INJECTION, EMULSION INTRAVENOUS AS NEEDED
Status: DISCONTINUED | OUTPATIENT
Start: 2020-01-20 | End: 2020-01-20 | Stop reason: SURG

## 2020-01-20 RX ORDER — ONDANSETRON 2 MG/ML
INJECTION INTRAMUSCULAR; INTRAVENOUS AS NEEDED
Status: DISCONTINUED | OUTPATIENT
Start: 2020-01-20 | End: 2020-01-20 | Stop reason: SURG

## 2020-01-20 RX ORDER — FENTANYL CITRATE 50 UG/ML
INJECTION, SOLUTION INTRAMUSCULAR; INTRAVENOUS AS NEEDED
Status: DISCONTINUED | OUTPATIENT
Start: 2020-01-20 | End: 2020-01-20 | Stop reason: SURG

## 2020-01-20 RX ORDER — ONDANSETRON 2 MG/ML
4 INJECTION INTRAMUSCULAR; INTRAVENOUS ONCE AS NEEDED
Status: DISCONTINUED | OUTPATIENT
Start: 2020-01-20 | End: 2020-01-20 | Stop reason: HOSPADM

## 2020-01-20 RX ORDER — FENTANYL CITRATE/PF 50 MCG/ML
25 SYRINGE (ML) INJECTION
Status: DISCONTINUED | OUTPATIENT
Start: 2020-01-20 | End: 2020-01-20 | Stop reason: HOSPADM

## 2020-01-20 RX ORDER — SODIUM CHLORIDE, SODIUM LACTATE, POTASSIUM CHLORIDE, CALCIUM CHLORIDE 600; 310; 30; 20 MG/100ML; MG/100ML; MG/100ML; MG/100ML
125 INJECTION, SOLUTION INTRAVENOUS CONTINUOUS
Status: DISCONTINUED | OUTPATIENT
Start: 2020-01-20 | End: 2020-01-20 | Stop reason: HOSPADM

## 2020-01-20 RX ORDER — BUPIVACAINE HYDROCHLORIDE 5 MG/ML
INJECTION, SOLUTION PERINEURAL AS NEEDED
Status: DISCONTINUED | OUTPATIENT
Start: 2020-01-20 | End: 2020-01-20 | Stop reason: HOSPADM

## 2020-01-20 RX ORDER — DEXAMETHASONE SODIUM PHOSPHATE 10 MG/ML
INJECTION, SOLUTION INTRAMUSCULAR; INTRAVENOUS AS NEEDED
Status: DISCONTINUED | OUTPATIENT
Start: 2020-01-20 | End: 2020-01-20 | Stop reason: SURG

## 2020-01-20 RX ORDER — MIDAZOLAM HYDROCHLORIDE 2 MG/2ML
INJECTION, SOLUTION INTRAMUSCULAR; INTRAVENOUS AS NEEDED
Status: DISCONTINUED | OUTPATIENT
Start: 2020-01-20 | End: 2020-01-20 | Stop reason: SURG

## 2020-01-20 RX ORDER — CEFAZOLIN SODIUM 2 G/50ML
2000 SOLUTION INTRAVENOUS ONCE
Status: COMPLETED | OUTPATIENT
Start: 2020-01-20 | End: 2020-01-20

## 2020-01-20 RX ORDER — HYDROMORPHONE HCL/PF 1 MG/ML
0.5 SYRINGE (ML) INJECTION
Status: DISCONTINUED | OUTPATIENT
Start: 2020-01-20 | End: 2020-01-20 | Stop reason: HOSPADM

## 2020-01-20 RX ORDER — MAGNESIUM HYDROXIDE 1200 MG/15ML
LIQUID ORAL AS NEEDED
Status: DISCONTINUED | OUTPATIENT
Start: 2020-01-20 | End: 2020-01-20 | Stop reason: HOSPADM

## 2020-01-20 RX ORDER — HYDROCODONE BITARTRATE AND ACETAMINOPHEN 5; 325 MG/1; MG/1
1-2 TABLET ORAL EVERY 4 HOURS PRN
Qty: 20 TABLET | Refills: 0 | Status: SHIPPED | OUTPATIENT
Start: 2020-01-20 | End: 2020-01-30

## 2020-01-20 RX ORDER — HYDROCODONE BITARTRATE AND ACETAMINOPHEN 5; 325 MG/1; MG/1
1 TABLET ORAL EVERY 6 HOURS PRN
Status: DISCONTINUED | OUTPATIENT
Start: 2020-01-20 | End: 2020-01-20 | Stop reason: HOSPADM

## 2020-01-20 RX ORDER — HYDROMORPHONE HCL/PF 1 MG/ML
SYRINGE (ML) INJECTION AS NEEDED
Status: DISCONTINUED | OUTPATIENT
Start: 2020-01-20 | End: 2020-01-20 | Stop reason: SURG

## 2020-01-20 RX ADMIN — HYDROMORPHONE HYDROCHLORIDE 0.5 MG: 1 INJECTION, SOLUTION INTRAMUSCULAR; INTRAVENOUS; SUBCUTANEOUS at 13:52

## 2020-01-20 RX ADMIN — FENTANYL CITRATE 25 MCG: 50 INJECTION, SOLUTION INTRAMUSCULAR; INTRAVENOUS at 13:32

## 2020-01-20 RX ADMIN — CEFAZOLIN SODIUM 2000 MG: 2 SOLUTION INTRAVENOUS at 13:11

## 2020-01-20 RX ADMIN — PROPOFOL 70 MG: 10 INJECTION, EMULSION INTRAVENOUS at 13:58

## 2020-01-20 RX ADMIN — FENTANYL CITRATE 25 MCG: 50 INJECTION, SOLUTION INTRAMUSCULAR; INTRAVENOUS at 13:14

## 2020-01-20 RX ADMIN — LIDOCAINE HYDROCHLORIDE 50 MG: 10 INJECTION, SOLUTION EPIDURAL; INFILTRATION; INTRACAUDAL; PERINEURAL at 13:14

## 2020-01-20 RX ADMIN — HYDROMORPHONE HYDROCHLORIDE 0.5 MG: 1 INJECTION, SOLUTION INTRAMUSCULAR; INTRAVENOUS; SUBCUTANEOUS at 13:43

## 2020-01-20 RX ADMIN — SODIUM CHLORIDE, SODIUM LACTATE, POTASSIUM CHLORIDE, AND CALCIUM CHLORIDE: .6; .31; .03; .02 INJECTION, SOLUTION INTRAVENOUS at 13:00

## 2020-01-20 RX ADMIN — ONDANSETRON HYDROCHLORIDE 4 MG: 2 INJECTION, SOLUTION INTRAVENOUS at 14:40

## 2020-01-20 RX ADMIN — FENTANYL CITRATE 50 MCG: 50 INJECTION, SOLUTION INTRAMUSCULAR; INTRAVENOUS at 13:18

## 2020-01-20 RX ADMIN — FENTANYL CITRATE 25 MCG: 50 INJECTION, SOLUTION INTRAMUSCULAR; INTRAVENOUS at 13:11

## 2020-01-20 RX ADMIN — HYDROMORPHONE HYDROCHLORIDE 0.5 MG: 1 INJECTION, SOLUTION INTRAMUSCULAR; INTRAVENOUS; SUBCUTANEOUS at 14:33

## 2020-01-20 RX ADMIN — FENTANYL CITRATE 25 MCG: 50 INJECTION, SOLUTION INTRAMUSCULAR; INTRAVENOUS at 13:30

## 2020-01-20 RX ADMIN — FENTANYL CITRATE 50 MCG: 50 INJECTION, SOLUTION INTRAMUSCULAR; INTRAVENOUS at 13:26

## 2020-01-20 RX ADMIN — DEXAMETHASONE SODIUM PHOSPHATE 4 MG: 10 INJECTION, SOLUTION INTRAMUSCULAR; INTRAVENOUS at 13:24

## 2020-01-20 RX ADMIN — PROPOFOL 200 MG: 10 INJECTION, EMULSION INTRAVENOUS at 13:14

## 2020-01-20 RX ADMIN — PROPOFOL 30 MG: 10 INJECTION, EMULSION INTRAVENOUS at 14:33

## 2020-01-20 RX ADMIN — MIDAZOLAM HYDROCHLORIDE 2 MG: 1 INJECTION, SOLUTION INTRAMUSCULAR; INTRAVENOUS at 13:11

## 2020-01-20 NOTE — DISCHARGE INSTRUCTIONS

## 2020-01-20 NOTE — INTERVAL H&P NOTE
H&P reviewed  After examining the patient I find no changes in the patients condition since the H&P had been written  The risks of bleeding, infection, need for additional procedures and adjacent organ damage have been explained along with low testosterone and he gives informed consent  His right testicle feels normal   This is a very large mass and may require an inguinal incision for vascular control proximally and scrotal incision for actual removal of the mass      Vitals:    01/20/20 1123   BP: 118/69   Pulse: 63   Resp: 18   Temp: 98 4 °F (36 9 °C)   SpO2: 95%

## 2020-01-20 NOTE — OP NOTE
OPERATIVE REPORT  PATIENT NAME: Kaycee Huff    :  1975  MRN: [id number]  Pt Location: [facility] OR ROOM 01    SURGERY DATE: 2020    Surgeon(s) and Role:     * Samuel Yu MD - Primary     * Isabela Sotomayor PA-C - Assisting    Preop Diagnosis:  Testicular mass [N50 89] left    Post-Op Diagnosis Codes:     * Testicular mass [N50 89] left    Procedure(s) (LRB):  LEFT RADICAL ORCHIECTOMY INGUINAL, patrial scrotectomy (Left)-combination of inguinal and scrotal approach due to size    Specimen(s):  ID Type Source Tests Collected by Time Destination   1 :  Tissue Testis, Left TISSUE EXAM Samuel Yu MD 2020 1413    2 : left scrotal skin Tissue Skin, Other TISSUE EXAM Samuel Yu MD 2020 1432        Estimated Blood Loss:   Minimal    Drains:  * No LDAs found *    Anesthesia Type:   General    Operative Indications:  Testicular mass [N50 89]      Operative Findings:  Normal spermatic cord, but very large left testicle with the appearance  of malignancy with neovascularity  No apparent local invasion  Complications:   None    Procedure and Technique:  52-year-old man who presents with left scrotal swelling and a scrotal ultrasound shows an infiltrating mass  The left testicle is 15 cm in size  Normal contralateral testicle  He has been offered left radical orchiectomy and markers have been drawn but are not available yet  He does not have abdominal imaging yet  I explained him that I plan to try this to an inguinal approach, but it is very possible I may have to approach from the scrotum as well which is on conventional in testicular cancer but the mass is too large to deliver through the inguinal canal   He and his mother understand he gives informed consent  The risks of bleeding infection hypogonadism and damage to adjacent organs were explained he gives informed consent  The patient was brought to the operating room and identified properly    LMA was induced the patient was prepped and draped in the supine position  I demarcated the inguinal region for an incision of approximately 4-5 cm on the left, infiltrated the tissue with 0 5% bupivacaine plain after a time-out had been performed, and I incised with a 15 blade down to subcutaneous tissue  Going through the subcutaneous tissue I encountered the epigastric vein which was ligated and fulgurated and divided  I then, staying close to the external ring, was able to localize the spermatic cord with self-retaining retractors and get a Penrose drain under the entire spermatic cord  Once this was isolated, I freed up the attachments of the spermatic cord so I would have more to work with and eventually be able to pull the cord through the inguinal canal down to the scrotum because the testicle so large  I then placed a Jazmin clamp across the cord at the proximal end to cut off the blood supply in avoid tumor dissemination through the vascular system as I worked with the tumor itself  At this point I decided that I was in no way going to be able to get the testicle up through the inguinal canal   I therefore located the median raphe of the scrotum, and making sure I did not damage the contralateral testicle or the penis which was buried, I incised through the median raphae with a 15 blade and then using the Bovie electrocautery freed up all the attachments to the skin and dartos fascia around the mass  Care was taken to not violate the mass  The mass was completely removed from the dartos fascia and skin, and then I placed another Jazmin clamp on the cord, divided the cord and then was able to bring the spermatic cord down through the inguinal canal with the Jazmin clamp on it and the testicle was delivered out through the scrotal wound  Sterile water was used for irrigation, and then I used 0 silk ties and stick ties to secure the proximal cord    Hemostasis was achieved in the proximal wound, the wound was irrigated again, and then I closed this wound with running 3 0 Monocryl bring the dead space together and I closed the skin with staples  I then decided to perform removal of the left scrotum for cancer control and cosmesis  Using the electrocautery, I divided the skin to remove the majority of the scrotum, taking care not to take penile shaft skin or damage the contralateral testicle  Hemostasis was achieved electrocautery, this was then heavily irrigated and I brought the skin together along with the underlying tissues after closing the tunica vaginalis of the contralateral testicle and pexing it with 1 Monocryl stitch-with the skin and the underlying tissues being brought together this eliminated the dead space for hemostasis  This also brought about a fairly decent looking closure  Once this was done, and hemostasis was excellent, the wound was dressed in fluffy dressings were applied to the scrotal wound and a jockstrap was applied  All sponge and needle counts were correct at the end of the procedure     I was present for the entire procedure and A physician assistant was required during the procedure for retraction tissue handling,dissection and suturing    Patient Disposition:  PACU  and extubated and stable    SIGNATURE: Chapito Elias MD  DATE: January 20, 2020  TIME: 3:17 PM

## 2020-01-20 NOTE — ANESTHESIA PREPROCEDURE EVALUATION
Review of Systems/Medical History  Patient summary reviewed  Chart reviewed      Cardiovascular  EKG reviewed, Exercise tolerance (METS): >4,    Comment: Normal sinus rhythm with sinus arrhythmia  Minimal voltage criteria for LVH, may be normal variant  Inferior infarct , age undetermined  Cannot rule out Anterior infarct , age undetermined  Abnormal ECG  No previous ECGs available  Confirmed by Bg Faustin ([id number]) on 10/28/2019 9:13:35 AM,  Pulmonary  Smoker cigarette smoker  , Tobacco cessation counseling given ,        GI/Hepatic    No GERD ,        Genitourinary malignancy ,        Endo/Other    Obesity    GYN       Hematology  Negative hematology ROS      Musculoskeletal       Neurology  Seizures ,     Psychology   Negative psychology ROS              Physical Exam    Airway    Mallampati score: II  TM Distance: >3 FB  Neck ROM: full     Dental   upper dentures and lower dentures,     Cardiovascular  Cardiovascular exam normal    Pulmonary  Pulmonary exam normal     Other Findings        Anesthesia Plan  ASA Score- 2     Anesthesia Type- general with ASA Monitors  Additional Monitors:   Airway Plan: LMA  Comment: Pt drink 4 oz water at 11:15 will postpone surgery 2 hrs   Plan Factors-  Patient did not smoke on day of surgery  Induction- intravenous  Postoperative Plan-     Informed Consent- Anesthetic plan and risks discussed with patient  I personally reviewed this patient with the CRNA  Discussed and agreed on the Anesthesia Plan with the CRNA  [facility]

## 2020-01-20 NOTE — TELEPHONE ENCOUNTER
----- Message from Roslyn Carbone MD sent at 1/20/2020  3:30 PM EST -----  Please call patient with an appointment to see me in 2 weeks for staple removal and to review pathology

## 2020-01-22 ENCOUNTER — OFFICE VISIT (OUTPATIENT)
Dept: INTERNAL MEDICINE CLINIC | Facility: CLINIC | Age: 45
End: 2020-01-22
Payer: MEDICARE

## 2020-01-22 ENCOUNTER — APPOINTMENT (OUTPATIENT)
Dept: PHYSICAL THERAPY | Facility: CLINIC | Age: 45
End: 2020-01-22
Payer: MEDICARE

## 2020-01-22 VITALS
HEART RATE: 79 BPM | BODY MASS INDEX: 30.09 KG/M2 | SYSTOLIC BLOOD PRESSURE: 120 MMHG | RESPIRATION RATE: 18 BRPM | OXYGEN SATURATION: 98 % | HEIGHT: 73 IN | TEMPERATURE: 99.1 F | DIASTOLIC BLOOD PRESSURE: 74 MMHG | WEIGHT: 227 LBS

## 2020-01-22 DIAGNOSIS — N50.89 SCROTAL SWELLING: Primary | ICD-10-CM

## 2020-01-22 DIAGNOSIS — G89.29 CHRONIC RIGHT HIP PAIN: ICD-10-CM

## 2020-01-22 DIAGNOSIS — Z90.79 S/P RADICAL UNILATERAL ORCHIECTOMY: ICD-10-CM

## 2020-01-22 DIAGNOSIS — M25.551 CHRONIC RIGHT HIP PAIN: ICD-10-CM
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
## 2020-01-22 DIAGNOSIS — M25.859 FEMORAL ACETABULAR IMPINGEMENT: ICD-10-CM

## 2020-01-22 DIAGNOSIS — R26.9 GAIT ABNORMALITY: ICD-10-CM

## 2020-01-22 PROCEDURE — 99214 OFFICE O/P EST MOD 30 MIN: CPT | Performed by: INTERNAL MEDICINE

## 2020-01-22 NOTE — PROGRESS NOTES
Assessment/Plan:  Problem List Items Addressed This Visit     None      Visit Diagnoses     Scrotal swelling    -  Primary    Chronic right hip pain        Fall, subsequent encounter        Gait abnormality        Femoral acetabular impingement        S/P radical unilateral orchiectomy               Diagnoses and all orders for this visit:    Scrotal swelling    Chronic right hip pain    Fall, subsequent encounter    Gait abnormality    Femoral acetabular impingement    S/P radical unilateral orchiectomy        No problem-specific Assessment & Plan notes found for this encounter  A/P: Doing better and awaiting path and  input  Appreciate  input  Would start PT for the hip  Keep f/u with   RTC two months for f/u and routine  Subjective:      Patient ID: Harish Poe is a 38 y o  male   RTC with his relative for f/u scrotal mass and hip pain, both chronic and acute after a fall  Pt had an US and showed an enlarged left testicle  Pt underwent urgent sx for testicular excision and did well with path pending  Doing better  NO fever or chills  Minimal pain  No more falls  Was to start with PT, but hasn't had sessions due to sx  The following portions of the patient's history were reviewed and updated as appropriate:   He has a past medical history of Cleft hard palate, Mass of left testicle, and Seizures ([facility] [address] )  ,  does not have any pertinent problems on file  ,   has a past surgical history that includes Brain surgery; Cleft lip repair; pr removal testis,radical (Left, 1/20/2020); and Orchiectomy (Left)  ,  family history includes COPD in his mother; Cancer in his family; Heart disease in his father; Hypertension in his mother; Seizures in his father  ,   reports that he has been smoking cigarettes  He has been smoking about 0 50 packs per day  He has quit using smokeless tobacco  He reports that he drinks alcohol  He reports that he does not use drugs  ,  has No Known Allergies     Current Outpatient Medications   Medication Sig Dispense Refill    cephalexin (KEFLEX) 500 mg capsule Take 1 capsule (500 mg total) by mouth every 6 (six) hours for 12 doses 12 capsule 0    HYDROcodone-acetaminophen (NORCO) 5-325 mg per tablet Take 1-2 tablets by mouth every 4 (four) hours as needed for pain for up to 10 daysMax Daily Amount: 12 tablets 20 tablet 0    meloxicam (MOBIC) 15 mg tablet Take 1 tablet (15 mg total) by mouth daily 90 tablet 3     No current facility-administered medications for this visit  Review of Systems   Constitutional: Negative for activity change, chills, diaphoresis, fatigue and fever  Respiratory: Negative for cough, chest tightness, shortness of breath and wheezing  Cardiovascular: Negative for chest pain, palpitations and leg swelling  Gastrointestinal: Negative for abdominal pain, constipation, diarrhea, nausea and vomiting  Genitourinary: Negative for difficulty urinating, dysuria and frequency  Musculoskeletal: Positive for arthralgias and gait problem  Negative for myalgias  Neurological: Negative for light-headedness and headaches  Psychiatric/Behavioral: Negative for confusion  The patient is not nervous/anxious  PHQ-9 Depression Screening    PHQ-9:    Frequency of the following problems over the past two weeks:             Objective:  Vitals:    01/22/20 1306   BP: 120/74   BP Location: Left arm   Patient Position: Sitting   Cuff Size: Adult   Pulse: 79   Resp: 18   Temp: 99 1 °F (37 3 °C)   TempSrc: Tympanic   SpO2: 98%   Weight: 103 kg (227 lb)   Height: 6' 1" (1 854 m)     Body mass index is 29 95 kg/m²  Physical Exam   Constitutional: He is oriented to person, place, and time  He appears well-developed and well-nourished  No distress  HENT:   Head: Normocephalic and atraumatic  Mouth/Throat: Oropharynx is clear and moist    Eyes: Pupils are equal, round, and reactive to light   Conjunctivae and EOM are normal    Cardiovascular: Normal rate, regular rhythm and normal heart sounds  Pulmonary/Chest: Effort normal and breath sounds normal  No respiratory distress  He has no wheezes  He has no rales  Abdominal: Soft  Bowel sounds are normal  He exhibits no distension  There is no tenderness  Neurological: He is alert and oriented to person, place, and time  Psychiatric: He has a normal mood and affect  His behavior is normal  Judgment and thought content normal    Nursing note and vitals reviewed

## 2020-01-27 ENCOUNTER — HOSPITAL ENCOUNTER (OUTPATIENT)
Dept: NEUROLOGY | Facility: CLINIC | Age: 45
Discharge: HOME | End: 2020-01-27

## 2020-01-27 DIAGNOSIS — R56.9 SEIZURE-LIKE ACTIVITY (HCC): ICD-10-CM

## 2020-01-27 NOTE — PROGRESS NOTES
With regard to monitoring findings on Mr Doug Mars MRI brain, neurosurgery recommends repeat imaging in 6 months to confirm stability than annually for 5 years

## 2020-01-28 ENCOUNTER — HOSPITAL ENCOUNTER (OUTPATIENT)
Dept: NEUROLOGY | Facility: CLINIC | Age: 45
Discharge: HOME | End: 2020-01-28
Payer: MEDICARE

## 2020-01-28 DIAGNOSIS — R56.9 SEIZURE-LIKE ACTIVITY (HCC): ICD-10-CM

## 2020-01-28 PROCEDURE — NC001 PR NO CHARGE: Performed by: PSYCHIATRY & NEUROLOGY

## 2020-01-28 PROCEDURE — 95708 EEG WO VID EA 12-26HR UNMNTR: CPT

## 2020-01-29 ENCOUNTER — APPOINTMENT (OUTPATIENT)
Dept: PHYSICAL THERAPY | Facility: CLINIC | Age: 45
End: 2020-01-29
Payer: MEDICARE

## 2020-01-29 ENCOUNTER — HOSPITAL ENCOUNTER (OUTPATIENT)
Dept: NEUROLOGY | Facility: CLINIC | Age: 45
Discharge: HOME | End: 2020-01-29
Payer: MEDICARE

## 2020-01-29 DIAGNOSIS — R56.9 SEIZURE-LIKE ACTIVITY (HCC): ICD-10-CM

## 2020-01-29 PROCEDURE — 95721 EEG PHY/QHP>36<60 HR W/O VID: CPT | Performed by: PSYCHIATRY & NEUROLOGY

## 2020-01-29 PROCEDURE — 95708 EEG WO VID EA 12-26HR UNMNTR: CPT

## 2020-02-03 ENCOUNTER — OFFICE VISIT (OUTPATIENT)
Dept: UROLOGY | Facility: CLINIC | Age: 45
End: 2020-02-03

## 2020-02-03 ENCOUNTER — DOCUMENTATION (OUTPATIENT)
Dept: UROLOGY | Facility: CLINIC | Age: 45
End: 2020-02-03

## 2020-02-03 VITALS
DIASTOLIC BLOOD PRESSURE: 80 MMHG | SYSTOLIC BLOOD PRESSURE: 126 MMHG | WEIGHT: 227.07 LBS | BODY MASS INDEX: 29.96 KG/M2 | HEART RATE: 80 BPM

## 2020-02-03 DIAGNOSIS — C62.12 MALIGNANT NEOPLASM OF DESCENDED LEFT TESTIS (HCC): Primary | ICD-10-CM

## 2020-02-03 PROCEDURE — 99024 POSTOP FOLLOW-UP VISIT: CPT | Performed by: UROLOGY

## 2020-02-03 NOTE — PROGRESS NOTES
I was present at [address] visit with Dr Hillary Ndiaye today  His mother was also at the visit  Dr Hillary Ndiaye reviewed his path results  Written information was given and reviewed on Testicular cancer and staging, orchiectomy and male fertility and CT  He is S/P left radical orchiectomy  Staples were removed and steri-strips applied  The incision was clean, dry and intact  Dr Hillary Ndiaye ordered tumor markers, CT and Chest XR  Dr Hillary Ndiaye will call Dede Loyola with results and make further referrals if needed after staging  He will be added to the next Urology case review meeting  All of their questions were answered  They were given my direct contact information

## 2020-02-03 NOTE — PROGRESS NOTES
[address] [facility]  [facility]  [address] [facility], [address] [place] [address]  [phone]  www  [email or web address]  [email or web address]      NAME: Wade Johns  AGE: 38 y o  SEX: male  : 1975   MRN: [id number]    DATE: 2/3/2020  TIME: 1:26 PM    Assessment and Plan:    Left testicular seminoma, large tumor  Discussed with patient and his mother and we will get a CT scan of the abdomen and pelvis, recheck his labs including markers and get a chest x-ray  We will base further referrals upon that  Chief Complaint   No chief complaint on file  History of Present Illness   Left testicular seminoma:  15 cm tumor status post left radical orchiectomy with left scrotal removal 2020  Unfortunately, preoperative tumor markers were not done  The scrotal skin was negative for tumor involvement, no tumor was seen at the spermatic cord or the spermatic cord margin, and no penetration through the tunica vaginalis was seen  He has not had any staging  On exam, his wound looks okay, the stitches are in place  No sign of erythema or infection  He is still somewhat sore  His father had seminoma as well        The following portions of the patient's history were reviewed and updated as appropriate: allergies, current medications, past family history, past medical history, past social history, past surgical history and problem list   Past Medical History:   Diagnosis Date    Cleft hard palate     Mass of left testicle     Seizures ([address] )      Past Surgical History:   Procedure Laterality Date    BRAIN SURGERY      CLEFT LIP REPAIR      ORCHIECTOMY Left     ME REMOVAL TESTIS,RADICAL Left 2020    Procedure: LEFT RADICAL ORCHIECTOMY INGUINAL, patrial scrotectomy;  Surgeon: Riley Serrano MD;  Location: [facility];  Service: Urology     shoulder  Review of Systems   Review of Systems    Active Problem List     Patient Active Problem List   Diagnosis    Mentally challenged    Tobacco abuse    Seizure-like activity ([facility] [address] )    S/P ventriculoperitoneal shunt    Testicular mass       Objective   There were no vitals taken for this visit      Physical Exam        Current Medications     Current Outpatient Medications:     meloxicam (MOBIC) 15 mg tablet, Take 1 tablet (15 mg total) by mouth daily, Disp: 90 tablet, Rfl: 3        Sree Villeda MD

## 2020-02-04 ENCOUNTER — TELEPHONE (OUTPATIENT)
Dept: INTERNAL MEDICINE CLINIC | Facility: CLINIC | Age: 45
End: 2020-02-04

## 2020-02-04 DIAGNOSIS — R56.9 SEIZURE-LIKE ACTIVITY (HCC): ICD-10-CM

## 2020-02-04 DIAGNOSIS — R56.9 SEIZURES (HCC): Primary | ICD-10-CM

## 2020-02-04 DIAGNOSIS — Z98.2 S/P VENTRICULOPERITONEAL SHUNT: ICD-10-CM

## 2020-02-04 NOTE — TELEPHONE ENCOUNTER
She needs a referral put into [facility] for neurology -consult and treat (he has na appt w yuli 2-11-20)  Dx  R56 [id number], [id number] 2    Please put this in -no need to fax    thanks

## 2020-02-05 ENCOUNTER — APPOINTMENT (OUTPATIENT)
Dept: LAB | Facility: HOSPITAL | Age: 45
End: 2020-02-05
Payer: MEDICARE

## 2020-02-05 ENCOUNTER — TRANSCRIBE ORDERS (OUTPATIENT)
Dept: LAB | Facility: HOSPITAL | Age: 45
End: 2020-02-05

## 2020-02-05 DIAGNOSIS — C62.12 MALIGNANT NEOPLASM OF DESCENDED LEFT TESTIS (HCC): ICD-10-CM

## 2020-02-05 DIAGNOSIS — N50.89 TESTICULAR MASS: ICD-10-CM

## 2020-02-05 DIAGNOSIS — Z13.220 SCREENING FOR LIPID DISORDERS: ICD-10-CM

## 2020-02-05 DIAGNOSIS — Z11.4 SCREENING FOR HUMAN IMMUNODEFICIENCY VIRUS: Primary | ICD-10-CM

## 2020-02-05 LAB
AFP-TM SERPL-MCNC: 5 NG/ML (ref 0.5–8)
ALBUMIN SERPL BCP-MCNC: 4.3 G/DL (ref 3.5–5.7)
ALP SERPL-CCNC: 96 U/L (ref 40–150)
ALT SERPL W P-5'-P-CCNC: 23 U/L (ref 7–52)
ANION GAP SERPL CALCULATED.3IONS-SCNC: 10 MMOL/L (ref 4–13)
AST SERPL W P-5'-P-CCNC: 13 U/L (ref 13–39)
B-HCG SERPL-ACNC: <0.6 MIU/ML (ref 0–0.6)
BILIRUB SERPL-MCNC: 0.9 MG/DL (ref 0.2–1)
BUN SERPL-MCNC: 16 MG/DL (ref 7–25)
CALCIUM SERPL-MCNC: 9.6 MG/DL (ref 8.6–10.5)
CHLORIDE SERPL-SCNC: 103 MMOL/L (ref 98–107)
CHOLEST SERPL-MCNC: 196 MG/DL (ref 0–200)
CO2 SERPL-SCNC: 26 MMOL/L (ref 21–31)
CREAT SERPL-MCNC: 0.9 MG/DL (ref 0.7–1.3)
ERYTHROCYTE [DISTWIDTH] IN BLOOD BY AUTOMATED COUNT: 15.3 % (ref 11.5–14.5)
GFR SERPL CREATININE-BSD FRML MDRD: 104 ML/MIN/1.73SQ M
GLUCOSE P FAST SERPL-MCNC: 99 MG/DL (ref 65–99)
HCG-TM SERPL-SCNC: <1 MLU/ML
HCT VFR BLD AUTO: 40.2 % (ref 42–47)
HDLC SERPL-MCNC: 36 MG/DL
HGB BLD-MCNC: 13.3 G/DL (ref 14–18)
LDLC SERPL CALC-MCNC: 132 MG/DL (ref 0–100)
MCH RBC QN AUTO: 30.3 PG (ref 26–34)
MCHC RBC AUTO-ENTMCNC: 33 G/DL (ref 31–37)
MCV RBC AUTO: 92 FL (ref 81–99)
PLATELET # BLD AUTO: 528 THOUSANDS/UL (ref 149–390)
PMV BLD AUTO: 8.1 FL (ref 8.6–11.7)
POTASSIUM SERPL-SCNC: 4 MMOL/L (ref 3.5–5.5)
PROT SERPL-MCNC: 7.6 G/DL (ref 6.4–8.9)
RBC # BLD AUTO: 4.38 MILLION/UL (ref 4.3–5.9)
SODIUM SERPL-SCNC: 139 MMOL/L (ref 134–143)
TRIGL SERPL-MCNC: 140 MG/DL (ref 44–166)
WBC # BLD AUTO: 9.8 THOUSAND/UL (ref 4.8–10.8)

## 2020-02-05 PROCEDURE — 85027 COMPLETE CBC AUTOMATED: CPT

## 2020-02-05 PROCEDURE — 82105 ALPHA-FETOPROTEIN SERUM: CPT

## 2020-02-05 PROCEDURE — 87389 HIV-1 AG W/HIV-1&-2 AB AG IA: CPT

## 2020-02-05 PROCEDURE — 84702 CHORIONIC GONADOTROPIN TEST: CPT

## 2020-02-05 PROCEDURE — 80061 LIPID PANEL: CPT

## 2020-02-05 PROCEDURE — 80053 COMPREHEN METABOLIC PANEL: CPT

## 2020-02-05 PROCEDURE — 36415 COLL VENOUS BLD VENIPUNCTURE: CPT

## 2020-02-06 LAB — HIV 1+2 AB+HIV1 P24 AG SERPL QL IA: NORMAL

## 2020-02-07 ENCOUNTER — HOSPITAL ENCOUNTER (OUTPATIENT)
Dept: CT IMAGING | Facility: HOSPITAL | Age: 45
Discharge: HOME | End: 2020-02-07
Payer: MEDICARE

## 2020-02-07 ENCOUNTER — HOSPITAL ENCOUNTER (OUTPATIENT)
Dept: RADIOLOGY | Facility: HOSPITAL | Age: 45
Discharge: HOME | End: 2020-02-07
Payer: MEDICARE

## 2020-02-07 DIAGNOSIS — C62.12 MALIGNANT NEOPLASM OF DESCENDED LEFT TESTIS (HCC): ICD-10-CM

## 2020-02-07 PROCEDURE — 74178 CT ABD&PLV WO CNTR FLWD CNTR: CPT

## 2020-02-07 PROCEDURE — 71046 X-RAY EXAM CHEST 2 VIEWS: CPT

## 2020-02-07 RX ADMIN — IOHEXOL 100 ML: 350 INJECTION, SOLUTION INTRAVENOUS at 15:18

## 2020-02-11 ENCOUNTER — DOCUMENTATION (OUTPATIENT)
Dept: NEUROLOGY | Facility: CLINIC | Age: 45
End: 2020-02-11

## 2020-02-11 ENCOUNTER — TELEPHONE (OUTPATIENT)
Dept: UROLOGY | Facility: MEDICAL CENTER | Age: 45
End: 2020-02-11

## 2020-02-11 ENCOUNTER — OFFICE VISIT (OUTPATIENT)
Dept: NEUROLOGY | Facility: CLINIC | Age: 45
End: 2020-02-11
Payer: MEDICARE

## 2020-02-11 VITALS
WEIGHT: 223.8 LBS | HEART RATE: 82 BPM | SYSTOLIC BLOOD PRESSURE: 138 MMHG | BODY MASS INDEX: 29.66 KG/M2 | HEIGHT: 73 IN | DIASTOLIC BLOOD PRESSURE: 74 MMHG

## 2020-02-11 DIAGNOSIS — R56.9 SEIZURE-LIKE ACTIVITY (HCC): Primary | ICD-10-CM

## 2020-02-11 DIAGNOSIS — R56.9 SEIZURES (HCC): ICD-10-CM

## 2020-02-11 DIAGNOSIS — G93.0 ARACHNOID CYST: ICD-10-CM

## 2020-02-11 DIAGNOSIS — Z98.2 S/P VENTRICULOPERITONEAL SHUNT: ICD-10-CM

## 2020-02-11 DIAGNOSIS — C62.12 MALIGNANT NEOPLASM OF DESCENDED LEFT TESTIS (HCC): Primary | ICD-10-CM

## 2020-02-11 PROCEDURE — 99214 OFFICE O/P EST MOD 30 MIN: CPT | Performed by: PSYCHIATRY & NEUROLOGY

## 2020-02-11 PROCEDURE — 3008F BODY MASS INDEX DOCD: CPT | Performed by: PSYCHIATRY & NEUROLOGY

## 2020-02-11 PROCEDURE — 4004F PT TOBACCO SCREEN RCVD TLK: CPT | Performed by: PSYCHIATRY & NEUROLOGY

## 2020-02-11 RX ORDER — SULFAMETHOXAZOLE AND TRIMETHOPRIM 800; 160 MG/1; MG/1
1 TABLET ORAL 2 TIMES DAILY
Qty: 14 TABLET | Refills: 0 | Status: SHIPPED | OUTPATIENT
Start: 2020-02-11 | End: 2020-02-18

## 2020-02-11 NOTE — TELEPHONE ENCOUNTER
Patient's mother aware of antibiotic and appointment scheduled for 02/14/2020 with Tomas Davis in the [facility]

## 2020-02-11 NOTE — PROGRESS NOTES
Please contact patient to schedule him for the EMU  He had an ambulatory EEG on 1/3/20 and Sleep Deprived EEG in November 2019  Please contact his mom Jimmy at [phone] to set this up  He does need a follow up after his EMU is completed with Dr Dillan Moore  Thank you

## 2020-02-11 NOTE — PROGRESS NOTES
PT Discharge    Today's date: 2020  Patient name: Terence Moulton  : 1975  MRN: [id number]  Referring provider: Bindu Schaeffer DO  Dx:   Encounter Diagnosis     ICD-10-CM    1  Chronic right hip pain M25 551     G89 29    2  Gait abnormality R26 9    3  Femoral acetabular impingement M25 859        Start Time: 1300  Stop Time: 1400  Total time in clinic (min): 60 minutes    Assessment  Assessment details: Terence Moulton is a 38 y o  male presenting to outpatient physical therapy with diagnosis of Chronic right hip pain  (primary encounter diagnosis)  Gait abnormality  Femoral acetabular impingement  Fall, initial encounter  Patient's current impairments include R hip pain, impaired soft tissue mobility, reduced R hip range of motion, reduced R hip  strength, reduced postural awareness, and reduced activity tolerance  Patient's present functional limitations include difficulty with ADLs with increased need for assistance, reliance on medication and/or modalities for pain relief, poor tolerance for functional mobility and activity, and difficulty completing [place]  responsibilities  Patient to benefit from skilled outpatient physical therapy 2x/week for 4 weeks in order to reduce pain, maximize pain free range of motion, increase strength and stability, and improve functional mobility/functional activity in order to maximize return to prior level of function with reduced limitations  Thank you for your referral   2/10/20 UPDATE: Pt did not return for further tx after his 6th visit  Cannot update objective data due to same  DC PT    Impairments: abnormal gait, abnormal or restricted ROM, activity intolerance, difficulty understanding, impaired balance, impaired physical strength, lacks appropriate home exercise program, pain with function, safety issue and poor posture   Barriers to therapy: Mentally challenged, seizures    Goals  STGs to be achieved in 4 weeks:  1   Pt to demonstrate reduced subjective pain rating "at worst" by at least 2-3 points from Initial Eval in order to allow for reduced pain with ADLs and improved functional activity tolerance  2  Pt to demonstrate increased AROM of R hip  by at least 5-10 degrees in order to allow for greater ease and independence with ADLs and functional mobility  3  Pt to demonstrate full PROM of R hip  in order to maximize joint mobility and function and allow for progression of exercise program and achievement of goals  4  Pt to demonstrate increased MMT of R hip by at least 1/2-1 grade in order to improve safety and stability with ADLs and functional mobility  LTGs to be achieved in 6-8 weeks:  1  Pt will be I with HEP in order to continue to improve quality of life and independence and reduce risk for re-injury  2  Pt to demonstrate return to Indep amb without asst devices withoout limitations or restrictions  3  Pt to demonstrate improved function as noted by achieving or exceeding predicted score on FOTO outcomes assessment tool  2/10/20 Pt did not achieve goals due to self DC from PT after 6 visits  Plan  Plan details: DC PT         Subjective Evaluation    History of Present Illness  Mechanism of injury: Pt states he fell out of bed and injured his R hip  Was taken to [facility] by ambulance  Had xrays  Imaging positive for CAM type femoral acetabular impingement Bilat  Not a recurrent problem   Quality of life: poor    Pain  Current pain ratin  At best pain ratin  At worst pain rating: 10  Quality: sharp  Relieving factors: rest and change in position (walking short distances)  Exacerbated by: transfers, bending    Progression: no change    Social Support  Steps to enter house: yes (1)  Lives in: multiple-level home (does not use stairs, uses cellar door and walks around to 1st floor door )  Lives with: parents (cousin)    Employment status: not working  Hand dominance: left      Diagnostic Tests  X-ray: abnormal  Treatments  Current treatment: medication and physical therapy  Patient Goals  Patient goals for therapy: decreased pain, increased motion, increased strength, improved balance and independence with ADLs/IADLs  Patient goal: walk w/o walker        Objective     Observations     Additional Observation Details  Pt ambulating with RW and fwd hunched gait, and mod antalgic gt  Palpation     Right   Tenderness of the gluteus nessa, gluteus medius, iliopsoas and TFL  Lumbar Screen  Lumbar range of motion within normal limits  Neurological Testing     Sensation     Hip   Left Hip   Intact: light touch    Right Hip   Intact: light touch    Active Range of Motion   Left Hip   Abduction: 37 degrees   External rotation (90/90): 31 degrees   Internal rotation (90/90): 18 degrees     Right Hip   Flexion: 72 degrees   Abduction: 33 degrees   External rotation (90/90): 37 degrees   Internal rotation (90/90): 18 degrees     Additional Active Range of Motion Details  R knee 0-125*    Strength/Myotome Testing     Left Hip   Planes of Motion   Flexion: 5  Abduction: 4+  Adduction: 5  External rotation: 4  Internal rotation: 4    Right Hip   Planes of Motion   Flexion: 4  Abduction: 4+  Adduction: 5  External rotation: 4  Internal rotation: 4    Tests     Right Hip   Positive Kalpana  Negative sign of the buttock                Precautions: mentally challenged, seizures, cleft palate

## 2020-02-11 NOTE — TELEPHONE ENCOUNTER
Patient of Dr Cindy Scott seen in the [place] office  Patient's mother called in and advised that the patient is having bleeding and pus at his testicle area  Patient mother advised that he had surgery on 1/20/20  Please advise

## 2020-02-11 NOTE — ASSESSMENT & PLAN NOTE
Has had 2 additional episodes  One episode occurred on ambulatory EEG and unassociated with clear epileptogenic features  Highly suspicious for non epileptogenic origin but cannot completely exclude the possibility of an underlying epileptogenic component  --discussed with patient and his mother  Reviewed the concept of epileptogenic seizures versus nonepileptic events  We also discussed the next step in evaluation would be through combined video/EEG monitoring through the EMU  Both acknowledged an understanding and agreement  Will be referring patient to the epilepsy team for monitoring in the EMU  --patient does not drive nor hold an active 's license

## 2020-02-11 NOTE — PROGRESS NOTES
Patient ID: Babatunde Scott is a 38 y o  male  Assessment/Plan:    Seizure-like activity ([address] )  Has had 2 additional episodes  One episode occurred on ambulatory EEG and unassociated with clear epileptogenic features  Highly suspicious for non epileptogenic origin but cannot completely exclude the possibility of an underlying epileptogenic component  --discussed with patient and his mother  Reviewed the concept of epileptogenic seizures versus nonepileptic events  We also discussed the next step in evaluation would be through combined video/EEG monitoring through the EMU  Both acknowledged an understanding and agreement  Will be referring patient to the epilepsy team for monitoring in the EMU  --patient does not drive nor hold an active 's license  Arachnoid cyst  Discovered incidentally on brain MRI  Films reviewed with Neurosurgery  Recommended by Neurosurgery to follow up in 6 months to assure stability and then annually for 5 years  --MRI brain, pre and post contrast, scheduled for 6 months hence  S/P ventriculoperitoneal shunt  Seen by Neurosurgery  Shunt appears to be functional     Mentally challenged     I spent a total of 25 min with the patient with greater than 50% of that time spent counseling and coordinating his care, specifically discussing his diagnosis, additional tests, and discussing the case with his mother, as detailed above  He will follow up after completion of his video/EEG monitoring  Subjective:    HPI  Patient, 38years of age, returns to discuss the results of his extended, ambulatory EEG and the status of his episodes  Today he was accompanied by his mother  His episodes are felt to very likely be at least in part on a non epileptogenic basis  Standard EEG normal   As result underwent 46 hours of ambulatory EEG monitoring  During that interval of time he had 1 event  That event was not associated with any clear epileptogenic features on the EEG    In addition, he had 1 additional episode shortly after completing his ambulatory EEG  He did have his brain MRI performed  That study demonstrated his known ventriculoperitoneal shunt  Also demonstrated a small arachnoid cyst in the left middle cranial fossa  The study was reviewed with Neurosurgery with recommendations for follow-up study in 6 months to assure stability and then on an annual basis for 5 years      Past Medical History:   Diagnosis Date    Cleft hard palate     Mass of left testicle     Seizures (HCC)      Past Surgical History:   Procedure Laterality Date    BRAIN SURGERY      CLEFT LIP REPAIR      ORCHIECTOMY Left     VT REMOVAL TESTIS,RADICAL Left 1/20/2020    Procedure: LEFT RADICAL ORCHIECTOMY INGUINAL, patrial scrotectomy;  Surgeon: Keith Ward MD;  Location: [facility];  Service: Urology     Social History     Socioeconomic History    Marital status: Single     Spouse name: None    Number of children: None    Years of education: None    Highest education level: None   Occupational History    Occupation: disabled   Social Needs    Financial resource strain: None    Food insecurity:     Worry: None     Inability: None    Transportation needs:     Medical: None     Non-medical: None   Tobacco Use    Smoking status: Current Every Day Smoker     Packs/day: 0 50     Types: Cigarettes    Smokeless tobacco: Former User   Substance and Sexual Activity    Alcohol use: Yes     Comment: rare    Drug use: Never    Sexual activity: Not Currently   Lifestyle    Physical activity:     Days per week: None     Minutes per session: None    Stress: None   Relationships    Social connections:     Talks on phone: None     Gets together: None     Attends Holiness service: None     Active member of club or organization: None     Attends meetings of clubs or organizations: None     Relationship status: None    Intimate partner violence:     Fear of current or ex partner: None     Emotionally abused: None     Physically abused: None     Forced sexual activity: None   Other Topics Concern    None   Social History Narrative    Disabled    Single    No children    Lives with his mother  Family History   Problem Relation Age of Onset    Hypertension Mother     COPD Mother     Seizures Father     Heart disease Father     Cancer Family      No Known Allergies    Current Outpatient Medications:     meloxicam (MOBIC) 15 mg tablet, Take 1 tablet (15 mg total) by mouth daily, Disp: 90 tablet, Rfl: 3    Objective:    Blood pressure 138/74, pulse 82, height 6' 1" (1 854 m), weight 102 kg (223 lb 12 8 oz)  Physical Exam  Head normocephalic  Eyes nonicteric  No audible anterior neck bruits  Lungs clear to auscultation  Rhythm regular  GI (abdomen) soft nontender  Bowel sounds present  No significant lower extremity edema  Neurological Exam  Alert  Speech impediment again noted  Pleasantly interactive  Once again utilizing a walker for ambulatory security  Cranial nerves 2-12 tested and grossly intact except for left facial asymmetry, unchanged from prior examination  No lateralized extremity weakness  Muscle stretch reflexes bilaterally 1 throughout the upper extremities, bilaterally 1+ at the knees and 2+ at the right ankle and 2 at the left ankle  ROS:    Review of Systems   Constitutional: Negative  Negative for appetite change and fever  HENT: Negative  Negative for hearing loss, tinnitus, trouble swallowing and voice change  Eyes: Negative  Negative for photophobia and pain  Respiratory: Negative  Negative for shortness of breath  Cardiovascular: Negative  Negative for palpitations  Gastrointestinal: Negative  Negative for nausea and vomiting  Endocrine: Negative  Negative for cold intolerance and heat intolerance  Genitourinary: Negative  Negative for dysuria, frequency and urgency  Musculoskeletal: Negative  Negative for myalgias and neck pain     Skin: Negative  Negative for rash  Neurological: Positive for seizures (last seizure 2 wks ago ) and weakness  Negative for dizziness, tremors, syncope, facial asymmetry, speech difficulty, light-headedness, numbness and headaches  Balance issues, difficulty walking    Hematological: Negative  Does not bruise/bleed easily  Psychiatric/Behavioral: Negative for confusion, hallucinations and sleep disturbance  The patient is nervous/anxious (Depression)  I personally reviewed the ROS as entered by the medical assistant  *Please note this document was created using voice recognition software and may contain sound-alike word errors  *

## 2020-02-11 NOTE — TELEPHONE ENCOUNTER
Patient s/p left radical orchiectomy inguinal   01/20/2020 by Dr Soraida Jules  Contacted and spoke with patient's mother  Mother states patient complaining of "blood and pus from testicle " Patient will not allow his mother to evaluate the scrotum  Mother states patient does not have fever/chills    Will forward message to Lev Lopez

## 2020-02-11 NOTE — LETTER
February 11, 2020     Elza Jeans, DO  [address]    Patient: Onel Guillory   YOB: 1975   Date of Visit: 2/11/2020       Dear Dr Luis Andrea: Thank you for referring Onel Guillory to me for evaluation  Below are my notes for this consultation  If you have questions, please do not hesitate to call me  I look forward to following your patient along with you  Sincerely,        Liz Arnett MD        CC: MD Liz Burciaga MD  2/11/2020  9:06 AM  Sign at close encounter  Patient ID: Onel Guillory is a 38 y o  male  Assessment/Plan:    Seizure-like activity ([address] )  Has had 2 additional episodes  One episode occurred on ambulatory EEG and unassociated with clear epileptogenic features  Highly suspicious for non epileptogenic origin but cannot completely exclude the possibility of an underlying epileptogenic component  --discussed with patient and his mother  Reviewed the concept of epileptogenic seizures versus nonepileptic events  We also discussed the next step in evaluation would be through combined video/EEG monitoring through the EMU  Both acknowledged an understanding and agreement  Will be referring patient to the epilepsy team for monitoring in the EMU  --patient does not drive nor hold an active 's license  Arachnoid cyst  Discovered incidentally on brain MRI  Films reviewed with Neurosurgery  Recommended by Neurosurgery to follow up in 6 months to assure stability and then annually for 5 years  --MRI brain, pre and post contrast, scheduled for 6 months hence  S/P ventriculoperitoneal shunt  Seen by Neurosurgery    Shunt appears to be functional     Mentally challenged     I spent a total of 25 min with the patient with greater than 50% of that time spent counseling and coordinating his care, specifically discussing his diagnosis, additional tests, and discussing the case with his mother, as detailed above  He will follow up after completion of his video/EEG monitoring  Subjective:    HPI  Patient, 38years of age, returns to discuss the results of his extended, ambulatory EEG and the status of his episodes  Today he was accompanied by his mother  His episodes are felt to very likely be at least in part on a non epileptogenic basis  Standard EEG normal   As result underwent 46 hours of ambulatory EEG monitoring  During that interval of time he had 1 event  That event was not associated with any clear epileptogenic features on the EEG  In addition, he had 1 additional episode shortly after completing his ambulatory EEG  He did have his brain MRI performed  That study demonstrated his known ventriculoperitoneal shunt  Also demonstrated a small arachnoid cyst in the left middle cranial fossa  The study was reviewed with Neurosurgery with recommendations for follow-up study in 6 months to assure stability and then on an annual basis for 5 years      Past Medical History:   Diagnosis Date    Cleft hard palate     Mass of left testicle     Seizures (HCC)      Past Surgical History:   Procedure Laterality Date    BRAIN SURGERY      CLEFT LIP REPAIR      ORCHIECTOMY Left     ID REMOVAL TESTIS,RADICAL Left 1/20/2020    Procedure: LEFT RADICAL ORCHIECTOMY INGUINAL, patrial scrotectomy;  Surgeon: Annabelle Uribe MD;  Location: Located within [facility];  Service: Urology     Social History     Socioeconomic History    Marital status: Single     Spouse name: None    Number of children: None    Years of education: None    Highest education level: None   Occupational History    Occupation: disabled   Social Needs    Financial resource strain: None    Food insecurity:     Worry: None     Inability: None    Transportation needs:     Medical: None     Non-medical: None   Tobacco Use    Smoking status: Current Every Day Smoker     Packs/day: 0 50     Types: Cigarettes    Smokeless tobacco: Former User Substance and Sexual Activity    Alcohol use: Yes     Comment: rare    Drug use: Never    Sexual activity: Not Currently   Lifestyle    Physical activity:     Days per week: None     Minutes per session: None    Stress: None   Relationships    Social connections:     Talks on phone: None     Gets together: None     Attends Spiritism service: None     Active member of club or organization: None     Attends meetings of clubs or organizations: None     Relationship status: None    Intimate partner violence:     Fear of current or ex partner: None     Emotionally abused: None     Physically abused: None     Forced sexual activity: None   Other Topics Concern    None   Social History Narrative    Disabled    Single    No children    Lives with his mother  Family History   Problem Relation Age of Onset    Hypertension Mother     COPD Mother     Seizures Father     Heart disease Father     Cancer Family      No Known Allergies    Current Outpatient Medications:     meloxicam (MOBIC) 15 mg tablet, Take 1 tablet (15 mg total) by mouth daily, Disp: 90 tablet, Rfl: 3    Objective:    Blood pressure 138/74, pulse 82, height 6' 1" (1 854 m), weight 102 kg (223 lb 12 8 oz)  Physical Exam  Head normocephalic  Eyes nonicteric  No audible anterior neck bruits  Lungs clear to auscultation  Rhythm regular  GI (abdomen) soft nontender  Bowel sounds present  No significant lower extremity edema  Neurological Exam  Alert  Speech impediment again noted  Pleasantly interactive  Once again utilizing a walker for ambulatory security  Cranial nerves 2-12 tested and grossly intact except for left facial asymmetry, unchanged from prior examination  No lateralized extremity weakness  Muscle stretch reflexes bilaterally 1 throughout the upper extremities, bilaterally 1+ at the knees and 2+ at the right ankle and 2 at the left ankle  ROS:    Review of Systems   Constitutional: Negative    Negative for appetite change and fever  HENT: Negative  Negative for hearing loss, tinnitus, trouble swallowing and voice change  Eyes: Negative  Negative for photophobia and pain  Respiratory: Negative  Negative for shortness of breath  Cardiovascular: Negative  Negative for palpitations  Gastrointestinal: Negative  Negative for nausea and vomiting  Endocrine: Negative  Negative for cold intolerance and heat intolerance  Genitourinary: Negative  Negative for dysuria, frequency and urgency  Musculoskeletal: Negative  Negative for myalgias and neck pain  Skin: Negative  Negative for rash  Neurological: Positive for seizures (last seizure 2 wks ago ) and weakness  Negative for dizziness, tremors, syncope, facial asymmetry, speech difficulty, light-headedness, numbness and headaches  Balance issues, difficulty walking    Hematological: Negative  Does not bruise/bleed easily  Psychiatric/Behavioral: Negative for confusion, hallucinations and sleep disturbance  The patient is nervous/anxious (Depression)  I personally reviewed the ROS as entered by the medical assistant  *Please note this document was created using voice recognition software and may contain sound-alike word errors  *

## 2020-02-11 NOTE — ASSESSMENT & PLAN NOTE
Discovered incidentally on brain MRI  Films reviewed with Neurosurgery  Recommended by Neurosurgery to follow up in 6 months to assure stability and then annually for 5 years  --MRI brain, pre and post contrast, scheduled for 6 months hence

## 2020-02-14 ENCOUNTER — OFFICE VISIT (OUTPATIENT)
Dept: UROLOGY | Facility: CLINIC | Age: 45
End: 2020-02-14

## 2020-02-14 VITALS
HEART RATE: 82 BPM | BODY MASS INDEX: 29.42 KG/M2 | DIASTOLIC BLOOD PRESSURE: 80 MMHG | SYSTOLIC BLOOD PRESSURE: 136 MMHG | WEIGHT: 223 LBS

## 2020-02-14 DIAGNOSIS — N50.89 TESTICULAR MASS: Primary | ICD-10-CM

## 2020-02-14 DIAGNOSIS — C62.92 TESTICULAR SEMINOMA, LEFT (HCC): ICD-10-CM

## 2020-02-14 PROCEDURE — 4004F PT TOBACCO SCREEN RCVD TLK: CPT | Performed by: PHYSICIAN ASSISTANT

## 2020-02-14 PROCEDURE — 99024 POSTOP FOLLOW-UP VISIT: CPT | Performed by: PHYSICIAN ASSISTANT

## 2020-02-14 NOTE — PROGRESS NOTES
2/14/2020      Chief Complaint   Patient presents with    Wound Check         Assessment and Plan    38 y o  male managed by Dr Austin Barragan    1  Left testicular seminoma  - status post left radical orchiectomy and scrotectomy 01/20/2020  - no preop markers  - postop HCG <1, AFP 5 0  - CT and CXR without evidence of metastatic disease  - CT scan performed 2/7/2020 did show scrotal fluid collection likely hematoma    Recommend he continue with regular soap and water washes, he is wearing a brief because of the drainage  He has had a total of four doses of Bactrim, he will continue this through 02/18/2020  He will return to the office on Monday for additional wound check with Dr Austin Barragan  History of Present Illness  Lulu Yates is a 38 y o  male here for evaluation of acute visit for wound check  His about three weeks status post left radical orchiectomy and scrotal checked to me  The left inguinal incision looks great  The staples were removed at the last visit about 10 days ago  He called in a few days ago with concern for infection of the scrotum  He would let anybody examine it  Prescription for Bactrim was called to him  Presents today for wound check and examination  He reports he is urinating fine, clear yellow urine  He reports no itching has not been scratching at the wound at all  He does reported to mildly sore and he is concerned about some new bloody drainage from the scrotum itself  No abdominal pain or pain in the rectum  He is ambulating at his baseline  No fever or chills and does not feel otherwise ill  Review of Systems   Constitutional: Negative  Respiratory: Negative  Cardiovascular: Negative  Genitourinary: Positive for scrotal swelling and testicular pain  Negative for decreased urine volume, difficulty urinating, dysuria, flank pain, frequency, hematuria, penile pain, penile swelling and urgency  Musculoskeletal: Positive for gait problem (uses walker)  Skin: Positive for wound                  Past Medical History  Past Medical History:   Diagnosis Date    Cleft hard palate     Mass of left testicle     Seizures (HCC)      Past Social History  Past Surgical History:   Procedure Laterality Date    BRAIN SURGERY      CLEFT LIP REPAIR      ORCHIECTOMY Left     OR REMOVAL TESTIS,RADICAL Left 1/20/2020    Procedure: LEFT RADICAL ORCHIECTOMY INGUINAL, patrial scrotectomy;  Surgeon: Emy Dawkins MD;  Location: [facility];  Service: Urology     Social History     Tobacco Use   Smoking Status Current Every Day Smoker    Packs/day: 0 50    Types: Cigarettes   Smokeless Tobacco Former User     Past Family History  Family History   Problem Relation Age of Onset    Hypertension Mother     COPD Mother     Seizures Father     Heart disease Father     Cancer Family      Past Social history  Social History     Socioeconomic History    Marital status: Single     Spouse name: Not on file    Number of children: Not on file    Years of education: Not on file    Highest education level: Not on file   Occupational History    Occupation: disabled   Social Needs    Financial resource strain: Not on file    Food insecurity:     Worry: Not on file     Inability: Not on file   Vendly needs:     Medical: Not on file     Non-medical: Not on file   Tobacco Use    Smoking status: Current Every Day Smoker     Packs/day: 0 50     Types: Cigarettes    Smokeless tobacco: Former User   Substance and Sexual Activity    Alcohol use: Yes     Comment: rare    Drug use: Never    Sexual activity: Not Currently   Lifestyle    Physical activity:     Days per week: Not on file     Minutes per session: Not on file    Stress: Not on file   Relationships    Social connections:     Talks on phone: Not on file     Gets together: Not on file     Attends Anabaptist service: Not on file     Active member of club or organization: Not on file     Attends meetings of clubs or organizations: Not on file     Relationship status: Not on file    Intimate partner violence:     Fear of current or ex partner: Not on file     Emotionally abused: Not on file     Physically abused: Not on file     Forced sexual activity: Not on file   Other Topics Concern    Not on file   Social History Narrative    Disabled    Single    No children    Lives with his mother  Current Medications  Current Outpatient Medications   Medication Sig Dispense Refill    meloxicam (MOBIC) 15 mg tablet Take 1 tablet (15 mg total) by mouth daily 90 tablet 3    sulfamethoxazole-trimethoprim (BACTRIM DS) 800-160 mg per tablet Take 1 tablet by mouth 2 (two) times a day for 7 days 14 tablet 0     No current facility-administered medications for this visit  Allergies  No Known Allergies      The following portions of the patient's history were reviewed and updated as appropriate: allergies, current medications, past medical history, past social history, past surgical history and problem list       Vitals  Vitals:    02/14/20 1435   BP: 136/80   Pulse: 82   Weight: 101 kg (223 lb)       Physical Exam   Constitutional: He is oriented to person, place, and time  He appears well-developed and well-nourished  No distress  HENT:   Head: Normocephalic and atraumatic  Pulmonary/Chest: Effort normal    Genitourinary:   Genitourinary Comments: Left inguinal incision clean dry intact, Steri-Strips in place  No overlying erythema induration or ecchymosis remaining  This area is nontender  Suprapubic fat pad is normal   Penis is somewhat buried  There is mild edema and erythema to the scrotum but not grossly swollen or enlarged, and bloody nonpurulent drainage from scrotectomy closure/incision at the midline of the scrotum  This is somewhat tender  There is NO erythema edema or induration extending into the perineum, perianal or along the thighs  Musculoskeletal: He exhibits no edema     Neurological: He is alert and oriented to person, place, and time  Gait normal    Skin: Skin is warm and dry  He is not diaphoretic  Psychiatric: He has a normal mood and affect  His speech is normal and behavior is normal    Nursing note and vitals reviewed  Results  No results found for this or any previous visit (from the past 1 hour(s))  ]  No results found for: PSA  Lab Results   Component Value Date    CALCIUM 9 6 02/05/2020    K 4 0 02/05/2020    CO2 26 02/05/2020     02/05/2020    BUN 16 02/05/2020    CREATININE 0 90 02/05/2020     Lab Results   Component Value Date    WBC 9 80 02/05/2020    HGB 13 3 (L) 02/05/2020    HCT 40 2 (L) 02/05/2020    MCV 92 02/05/2020     (H) 02/05/2020         Orders  No orders of the defined types were placed in this encounter

## 2020-02-17 ENCOUNTER — OFFICE VISIT (OUTPATIENT)
Dept: UROLOGY | Facility: CLINIC | Age: 45
End: 2020-02-17

## 2020-02-17 VITALS
HEART RATE: 80 BPM | BODY MASS INDEX: 29.42 KG/M2 | DIASTOLIC BLOOD PRESSURE: 82 MMHG | SYSTOLIC BLOOD PRESSURE: 138 MMHG | WEIGHT: 223 LBS

## 2020-02-17 DIAGNOSIS — C62.92 TESTICULAR SEMINOMA, LEFT (HCC): Primary | ICD-10-CM

## 2020-02-17 PROCEDURE — 99024 POSTOP FOLLOW-UP VISIT: CPT | Performed by: UROLOGY

## 2020-02-17 NOTE — PROGRESS NOTES
[address] [facility]  [facility]  [address] [facility]  [place], [address] [place] [address]  [phone]  www  [facility]  org      NAME: Babatunde Scott  AGE: 38 y o  SEX: male  : 1975   MRN: [id number]    DATE: 2020  TIME: 3:56 PM    Assessment and Plan:    Left testicular seminoma, status post left radical orchiectomy with left hemiscrotum removal, all margins negative  CT scan negative for metastases, HCG and AFP are negative  We will refer him to Radiation Oncology for consideration of hockey-stick adjuvant radiation therapy  Scrotal hematoma, mostly resolved, no sign of infection, I reassured he and his mother that this is normal for it to drain and overall I am happy with the progress of his wound  Follow-up with me in 3 months  Chief Complaint     Chief Complaint   Patient presents with    Wound Check       History of Present Illness   Left testicular seminoma:  CT scan negative  Status post left radical orchiectomy with left scrotal removal 2020  No preoperative markers, but postoperative markers are normal   Scrotal skin was negative for tumor involvement  CT scan is negative for metastases, which showed a hematoma in the scrotal remnant  He is here for a wound check regarding this  He is feeling much better  No further pain  He continues to have some bloody drainage in his diaper        The following portions of the patient's history were reviewed and updated as appropriate: allergies, current medications, past family history, past medical history, past social history, past surgical history and problem list   Past Medical History:   Diagnosis Date    Cleft hard palate     Mass of left testicle     Seizures ([address] )      Past Surgical History:   Procedure Laterality Date    BRAIN SURGERY      CLEFT LIP REPAIR      ORCHIECTOMY Left     TX REMOVAL TESTIS,RADICAL Left 2020    Procedure: LEFT RADICAL ORCHIECTOMY INGUINAL, patrial scrotectomy;  Surgeon: Jakub Villela MD;  Location: [facility];  Service: Urology     shoulder  Review of Systems   Review of Systems    Active Problem List     Patient Active Problem List   Diagnosis    Mentally challenged    Tobacco abuse    Seizure-like activity ([facility] [address] )    S/P ventriculoperitoneal shunt    Testicular mass    Arachnoid cyst    Testicular seminoma, left (HCC)       Objective   /82   Pulse 80   Wt 101 kg (223 lb)   BMI 29 42 kg/m²     Physical Exam   Constitutional: He is oriented to person, place, and time  He appears well-developed and well-nourished  HENT:   Head: Normocephalic and atraumatic  Eyes: EOM are normal    Neck: Normal range of motion  Genitourinary:   Genitourinary Comments: There are 2 spots in the wound that are partially open and are source of draining blood  There is no sign of erythema, purulent drainage or any other signs of infection  Much less tender than he was before  The skin looks normal    Musculoskeletal:   Walks with walker   Neurological: He is alert and oriented to person, place, and time  Skin: Skin is warm and dry             Current Medications     Current Outpatient Medications:     meloxicam (MOBIC) 15 mg tablet, Take 1 tablet (15 mg total) by mouth daily, Disp: 90 tablet, Rfl: 3    sulfamethoxazole-trimethoprim (BACTRIM DS) 800-160 mg per tablet, Take 1 tablet by mouth 2 (two) times a day for 7 days, Disp: 14 tablet, Rfl: 0        Jakub Villela MD

## 2020-02-18 ENCOUNTER — TELEPHONE (OUTPATIENT)
Dept: UROLOGY | Facility: AMBULATORY SURGERY CENTER | Age: 45
End: 2020-02-18

## 2020-02-18 ENCOUNTER — TELEPHONE (OUTPATIENT)
Dept: SURGICAL ONCOLOGY | Facility: CLINIC | Age: 45
End: 2020-02-18

## 2020-02-18 ENCOUNTER — DOCUMENTATION (OUTPATIENT)
Dept: UROLOGY | Facility: AMBULATORY SURGERY CENTER | Age: 45
End: 2020-02-18

## 2020-02-18 DIAGNOSIS — C62.92 TESTICULAR SEMINOMA, LEFT (HCC): Primary | ICD-10-CM

## 2020-02-18 NOTE — TELEPHONE ENCOUNTER
This patients chart has been reviewed and at this time we do not have any clinical trials for this cancer type at [facility]

## 2020-02-18 NOTE — PROGRESS NOTES
Oncology Navigator Note: Multidisciplinary Urology Case Review 2/18/20  Physician Recommended Plan    Kris Martines is a 38 y o  male    Diagnosis: Testicular Cancer, Seminoma    Patient was discussed at the Multidisciplinary Urology Case review on 2/18/20  The group recommended referral to Radiation Oncology and Medical Oncology

## 2020-02-18 NOTE — PROGRESS NOTES
Spoke with patient's mother and scheduled EMU admission for 3/23/2020; information sheet mailed home

## 2020-02-18 NOTE — TELEPHONE ENCOUNTER
Called Claus  Spoke to Chana Packer (mom)  Made her aware that Omar Sherwood was discussed at Urology Case Review this morning and the group recommended Med Onc and Rad Onc consults  Omar Sherwood is scheduled for Rad Onc consult 2/28/20  Called the [facility] line to make them aware of Med Onc consult

## 2020-02-18 NOTE — TELEPHONE ENCOUNTER
New Patient Encounter    New Patient Intake Form   Patient Details:  Babatunde Scott  1975  [id number]    Background Information:  [address] starts by opening a telephone encounter and gathering the following information   Who is calling to schedule? If not self, relationship to patient? wife   Referring Provider Dr Altaf Peter   What is the diagnosis? Testicular seminoma, left    Is this diagnosis confirmed Yes   Is there a confirmed diagnosis from a biopsy/tissue reviewed by pathology? No   Is there any prior history of Cancer? No   If yes, please explain na   When was the diagnosis? 2/18   Is patient aware of diagnosis? Yes   Reason for visit? NP DX   Have you had any testing done? If so: when, where? Yes   Are records in [facility]? yes   Was the patient told to bring a disk? no   Scheduling Information:   Preferred [place]:  [place]     Requesting Specific Provider? Dr Alison Carmichael   Are there any dates/time the patient cannot be seen? na      Miscellaneous: na   After completing the above information, please route to Financial Counselor and the appropriate Nurse Navigator for review

## 2020-02-21 ENCOUNTER — CONSULT (OUTPATIENT)
Dept: HEMATOLOGY ONCOLOGY | Facility: CLINIC | Age: 45
End: 2020-02-21
Payer: MEDICARE

## 2020-02-21 VITALS
OXYGEN SATURATION: 97 % | WEIGHT: 217 LBS | SYSTOLIC BLOOD PRESSURE: 120 MMHG | TEMPERATURE: 99.6 F | HEIGHT: 73 IN | HEART RATE: 73 BPM | DIASTOLIC BLOOD PRESSURE: 80 MMHG | BODY MASS INDEX: 28.76 KG/M2

## 2020-02-21 DIAGNOSIS — C62.92 TESTICULAR SEMINOMA, LEFT (HCC): ICD-10-CM

## 2020-02-21 PROCEDURE — 99205 OFFICE O/P NEW HI 60 MIN: CPT | Performed by: INTERNAL MEDICINE

## 2020-02-21 NOTE — PROGRESS NOTES
Desmond Railing  1975  [facility] [place]  [address] [facility] [id number] [phone]    No chief complaint on file  No history exists  History of Present Illness:  December 2019 patient began to notice left testicular mass  This worsened and he was evaluated in Urology in late January 2020  He was found to have a 15 cm left testicular mass  Ultrasound confirmed a mass suspicious for malignancy  MRI brain showed a small arachnoid cyst as well as right frontal  shunt  January 20, 2020 he underwent left orchiectomy  Pathology indicated a 15 5 cm seminoma with extension beyond the tunica albuginea into the epididymis and hilar fat  Tunica vaginalis was not affected  No tumor in the spermatic cord or cord margin  Postoperative CT of the abdomen pelvis showed scrotal hematoma  No evidence of bone lesion, adenopathy, etcetera  Chest x-ray was normal   Preoperative tumor markers had been requested though not accomplished  February 5th 2020 AFP and HCG are normal   CBC shows platelet count 220, hemoglobin 13 3, white count 9 8  CMP is normal     Review of Systems   Constitutional: Negative for chills and fever  HENT: Negative for nosebleeds  Eyes: Negative for discharge  Respiratory: Negative for cough and shortness of breath  Cardiovascular: Negative for chest pain  Gastrointestinal: Negative for abdominal pain, constipation and diarrhea  Endocrine: Negative for polydipsia  Genitourinary: Negative for hematuria  Musculoskeletal: Negative for arthralgias  Skin: Negative for color change  Allergic/Immunologic: Negative for immunocompromised state  Neurological: Negative for dizziness and headaches  Hematological: Negative for adenopathy  Psychiatric/Behavioral: Negative for agitation         Patient Active Problem List   Diagnosis    Mentally challenged    Tobacco abuse    Seizure-like activity ([facility] [address] )    S/P ventriculoperitoneal shunt    Testicular mass    Arachnoid cyst    Testicular seminoma, left (HCC)     Past Medical History:   Diagnosis Date    Cleft hard palate     Mass of left testicle     Seizures (HCC)      Past Surgical History:   Procedure Laterality Date    BRAIN SURGERY      CLEFT LIP REPAIR      ORCHIECTOMY Left     [place] REMOVAL TESTIS,RADICAL Left 1/20/2020    Procedure: LEFT RADICAL ORCHIECTOMY INGUINAL, patrial scrotectomy;  Surgeon: Darryl Evans MD;  Location: [facility];  Service: Urology     Family History   Problem Relation Age of Onset    Hypertension Mother     COPD Mother     Seizures Father     Heart disease Father     Cancer Family      Social History     Socioeconomic History    Marital status: Single     Spouse name: Not on file    Number of children: Not on file    Years of education: Not on file    Highest education level: Not on file   Occupational History    Occupation: disabled   Social Needs    Financial resource strain: Not on file    Food insecurity:     Worry: Not on file     Inability: Not on file    Transportation needs:     Medical: Not on file     Non-medical: Not on file   Tobacco Use    Smoking status: Current Every Day Smoker     Packs/day: 0 50     Types: Cigarettes    Smokeless tobacco: Former User   Substance and Sexual Activity    Alcohol use: Yes     Comment: rare    Drug use: Never    Sexual activity: Not Currently   Lifestyle    Physical activity:     Days per week: Not on file     Minutes per session: Not on file    Stress: Not on file   Relationships    Social connections:     Talks on phone: Not on file     Gets together: Not on file     Attends [employer] service: Not on file     Active member of club or organization: Not on file     Attends meetings of clubs or organizations: Not on file     Relationship status: Not on file    Intimate partner violence:     Fear of current or ex partner: Not on file     Emotionally abused: Not on file     Physically abused: Not on file     Forced sexual activity: Not on file   Other Topics Concern    Not on file   Social History Narrative    Disabled    Single    No children    Lives with his mother  Current Outpatient Medications:     meloxicam (MOBIC) 15 mg tablet, Take 1 tablet (15 mg total) by mouth daily, Disp: 90 tablet, Rfl: 3  No Known Allergies  Vitals:    02/21/20 1058   BP: 120/80   Pulse: 73   Temp: 99 6 °F (37 6 °C)   SpO2: 97%       Physical Exam   Constitutional: He is oriented to person, place, and time  He appears well-developed  HENT:   Head: Normocephalic  Eyes: Pupils are equal, round, and reactive to light  Neck: Neck supple  Cardiovascular: Normal rate and regular rhythm  No murmur heard  Pulmonary/Chest: Breath sounds normal  He has no wheezes  He has no rales  Abdominal: Soft  There is no tenderness  Musculoskeletal: Normal range of motion  He exhibits no edema or tenderness  Lymphadenopathy:     He has no cervical adenopathy  Neurological: He is alert and oriented to person, place, and time  He has normal reflexes  No cranial nerve deficit  Skin: No rash noted  No erythema  Psychiatric: He has a normal mood and affect  His behavior is normal          Labs:  CBC, Coags, BMP, Mg, Phos     Imaging  Ct Abdomen Pelvis W Wo Contrast    Result Date: 2/11/2020  Narrative: CT ABDOMEN AND PELVIS WITH AND WITHOUT IV CONTRAST INDICATION:   C62 12: Malignant neoplasm of descended left testis  COMPARISON:  None  TECHNIQUE: Initial CT of the abdomen and pelvis was performed without intravenous contrast   Subsequent dynamic CT evaluation of the abdomen and pelvis was performed after the administration of intravenous contrast in both nephrographic and delayed phases after the administration of intravenous contrast   Axial, sagittal, and coronal 2D reformatted images were created from the source data and submitted for interpretation   Radiation dose length product (DLP) for this visit:  1942 6 mGy-cm   This examination, like all CT scans performed in the [facility], was performed utilizing techniques to minimize radiation dose exposure, including the use of iterative  reconstruction and automated exposure control  IV Contrast:  100 mL of iohexol (OMNIPAQUE) Enteric Contrast:  Enteric contrast was not administered  FINDINGS: ABDOMEN LOWER CHEST:  No clinically significant abnormality identified in the visualized lower chest  LIVER/BILIARY TREE:  Unremarkable  GALLBLADDER:  Small gallstones are seen  No pericholecystic inflammatory change  SPLEEN:  Unremarkable  PANCREAS:  Unremarkable  KIDNEYS: There is a small right renal cyst  ADRENAL GLANDS:  Unremarkable  STOMACH AND BOWEL:  Unremarkable  ABDOMINOPELVIC CAVITY:  No ascites  No free intraperitoneal air  No lymphadenopathy  VESSELS:  Unremarkable for patient's age  PELVIS REPRODUCTIVE ORGANS:  The patient is status post left orchiectomy  There is a 4 9 x 6 5 x 10 9 cm complex collection in the left hemiscrotum with central hyperdensity possibly representing postoperative hematoma  APPENDIX: No findings to suggest appendicitis  BLADDER: Unremarkable ABDOMINAL WALL/INGUINAL REGIONS:  There is postoperative stranding in the left inguinal region  Mildly prominent inguinal lymph nodes are likely reactive  OSSEOUS STRUCTURES:  No acute fracture or destructive osseous lesion  Anterior to the right sacrum is a 1 4 x 1 6 cm nonenhanced nodular density  This may be associated with the exiting right L5 nerve root and likely represents dural ectasia  Impression: 1  No evidence of metastatic disease in the abdomen or pelvis  2   Large collection in the left hemiscrotum with central hyperdensity likely representing hematoma  Clinical correlation recommended  3   Postoperative stranding in the left inguinal region with prominent lymph nodes which are likely reactive  4   Cholelithiasis    I personally discussed this study with Estelle Clayton on 2/11/2020 at 4:33 PM  Workstation performed: [id number]     Xr Chest Pa & Lateral    Result Date: 2/7/2020  Narrative: CHEST INDICATION:   C62 12: Malignant neoplasm of descended left testis  COMPARISON:  Chest radiograph from 6/11/2007  EXAM PERFORMED/VIEWS:  XR CHEST PA & LATERAL FINDINGS: Cardiomediastinal silhouette appears unremarkable  The lungs are clear  No pneumothorax or pleural effusion  Osseous structures appear within normal limits for patient age  Impression: No acute cardiopulmonary disease  Workstation performed: [id number]     I reviewed the above laboratory and imaging data  Discussion/Summary:  In summary, this is a 56-year-old male history of recently resected left testicular seminoma, T2 N0, stage IB  We reviewed that this is likely his stage diagnosis  Serum tumor markers were not obtained, though ordered  In any event, I think it is reasonable to follow NCCN recommendations for stage IB seminoma  These favor observation  Prophylactic radiation therapy or chemotherapy are reasonable to consider  I reviewed with the patient and his mother that the likelihood of cure is quite high and that observation is reasonable with a goal of avoiding unnecessary treatment while still enjoying good overall outcomes  If recurrence is noted salvage is generally quite effective  The patient and his mother have a preference for avoiding therapy unless necessary    I will confer with Radiation therapy regarding their opinion and if it differs let the patient and his mother know so that they could proceed with the scheduled appointment  Otherwise, we will make arrangements to see him back in May with repeat imaging and blood work just prior to that visit to initiate surveillance

## 2020-03-20 ENCOUNTER — TELEPHONE (OUTPATIENT)
Dept: NEUROLOGY | Facility: CLINIC | Age: 45
End: 2020-03-20

## 2020-03-20 NOTE — TELEPHONE ENCOUNTER
Patient's mother called back and has decided to cancel the admission at this time  Will call her to reschedule when Jose Castillo in PACS made aware

## 2020-03-20 NOTE — TELEPHONE ENCOUNTER
Spoke with patient's mother Luzmaria An  Confirmed no fever, cough, or shortness of breath  They would like to proceed with the admission on Monday as planned

## 2020-05-06 ENCOUNTER — TELEPHONE (OUTPATIENT)
Dept: NEUROLOGY | Facility: CLINIC | Age: 45
End: 2020-05-06

## 2020-05-06 DIAGNOSIS — R56.9 SEIZURE-LIKE ACTIVITY (HCC): Primary | ICD-10-CM

## 2020-05-08 ENCOUNTER — HOSPITAL ENCOUNTER (OUTPATIENT)
Dept: CT IMAGING | Facility: HOSPITAL | Age: 45
Discharge: HOME | End: 2020-05-08
Payer: MEDICARE

## 2020-05-08 DIAGNOSIS — C62.92 TESTICULAR SEMINOMA, LEFT (HCC): ICD-10-CM

## 2020-05-08 PROCEDURE — 74177 CT ABD & PELVIS W/CONTRAST: CPT

## 2020-05-08 RX ADMIN — IOHEXOL 100 ML: 350 INJECTION, SOLUTION INTRAVENOUS at 13:20

## 2020-05-12 ENCOUNTER — TELEMEDICINE (OUTPATIENT)
Dept: UROLOGY | Facility: MEDICAL CENTER | Age: 45
End: 2020-05-12
Payer: MEDICARE

## 2020-05-12 DIAGNOSIS — C62.92 TESTICULAR SEMINOMA, LEFT (HCC): Primary | ICD-10-CM

## 2020-05-12 PROCEDURE — 99441 PR PHYS/QHP TELEPHONE EVALUATION 5-10 MIN: CPT | Performed by: UROLOGY

## 2020-05-13 ENCOUNTER — TELEPHONE (OUTPATIENT)
Dept: UROLOGY | Facility: MEDICAL CENTER | Age: 45
End: 2020-05-13

## 2020-05-14 ENCOUNTER — TELEPHONE (OUTPATIENT)
Dept: HEMATOLOGY ONCOLOGY | Facility: CLINIC | Age: 45
End: 2020-05-14

## 2020-05-15 ENCOUNTER — OFFICE VISIT (OUTPATIENT)
Dept: HEMATOLOGY ONCOLOGY | Facility: CLINIC | Age: 45
End: 2020-05-15
Payer: MEDICARE

## 2020-05-15 ENCOUNTER — APPOINTMENT (OUTPATIENT)
Dept: LAB | Facility: HOSPITAL | Age: 45
End: 2020-05-15
Attending: INTERNAL MEDICINE
Payer: MEDICARE

## 2020-05-15 ENCOUNTER — TELEPHONE (OUTPATIENT)
Dept: HEMATOLOGY ONCOLOGY | Facility: MEDICAL CENTER | Age: 45
End: 2020-05-15

## 2020-05-15 ENCOUNTER — HOSPITAL ENCOUNTER (OUTPATIENT)
Dept: RADIOLOGY | Facility: HOSPITAL | Age: 45
Discharge: HOME | End: 2020-05-15
Attending: INTERNAL MEDICINE
Payer: MEDICARE

## 2020-05-15 VITALS
DIASTOLIC BLOOD PRESSURE: 76 MMHG | TEMPERATURE: 98.8 F | SYSTOLIC BLOOD PRESSURE: 124 MMHG | OXYGEN SATURATION: 97 % | HEART RATE: 65 BPM | RESPIRATION RATE: 17 BRPM

## 2020-05-15 DIAGNOSIS — M25.551 HIP PAIN, ACUTE, RIGHT: ICD-10-CM

## 2020-05-15 DIAGNOSIS — C62.92 TESTICULAR SEMINOMA, LEFT (HCC): Primary | ICD-10-CM

## 2020-05-15 DIAGNOSIS — M25.551 ACUTE HIP PAIN, RIGHT: ICD-10-CM

## 2020-05-15 PROBLEM — N50.89 TESTICULAR MASS: Status: RESOLVED | Noted: 2020-01-16 | Resolved: 2020-05-15

## 2020-05-15 LAB
AFP-TM SERPL-MCNC: 4.9 NG/ML (ref 0.5–8)
ALBUMIN SERPL BCP-MCNC: 4 G/DL (ref 3.5–5)
ALP SERPL-CCNC: 95 U/L (ref 46–116)
ALT SERPL W P-5'-P-CCNC: 35 U/L (ref 12–78)
ANION GAP SERPL CALCULATED.3IONS-SCNC: 7 MMOL/L (ref 4–13)
AST SERPL W P-5'-P-CCNC: 22 U/L (ref 5–45)
BASOPHILS # BLD AUTO: 0.05 THOUSANDS/ΜL (ref 0–0.1)
BASOPHILS NFR BLD AUTO: 1 % (ref 0–1)
BILIRUB SERPL-MCNC: 1.5 MG/DL (ref 0.2–1)
BUN SERPL-MCNC: 10 MG/DL (ref 5–25)
CALCIUM SERPL-MCNC: 9.5 MG/DL (ref 8.3–10.1)
CHLORIDE SERPL-SCNC: 103 MMOL/L (ref 100–108)
CO2 SERPL-SCNC: 28 MMOL/L (ref 21–32)
CREAT SERPL-MCNC: 0.98 MG/DL (ref 0.6–1.3)
EOSINOPHIL # BLD AUTO: 0.07 THOUSAND/ΜL (ref 0–0.61)
EOSINOPHIL NFR BLD AUTO: 1 % (ref 0–6)
ERYTHROCYTE [DISTWIDTH] IN BLOOD BY AUTOMATED COUNT: 15 % (ref 11.6–15.1)
GFR SERPL CREATININE-BSD FRML MDRD: 94 ML/MIN/1.73SQ M
GLUCOSE SERPL-MCNC: 98 MG/DL (ref 65–140)
HCG-TM SERPL-SCNC: <2 MLU/ML
HCT VFR BLD AUTO: 45.4 % (ref 36.5–49.3)
HGB BLD-MCNC: 15.4 G/DL (ref 12–17)
IMM GRANULOCYTES # BLD AUTO: 0.02 THOUSAND/UL (ref 0–0.2)
IMM GRANULOCYTES NFR BLD AUTO: 0 % (ref 0–2)
LYMPHOCYTES # BLD AUTO: 2.13 THOUSANDS/ΜL (ref 0.6–4.47)
LYMPHOCYTES NFR BLD AUTO: 20 % (ref 14–44)
MCH RBC QN AUTO: 30.7 PG (ref 26.8–34.3)
MCHC RBC AUTO-ENTMCNC: 33.9 G/DL (ref 31.4–37.4)
MCV RBC AUTO: 91 FL (ref 82–98)
MONOCYTES # BLD AUTO: 0.54 THOUSAND/ΜL (ref 0.17–1.22)
MONOCYTES NFR BLD AUTO: 5 % (ref 4–12)
NEUTROPHILS # BLD AUTO: 7.62 THOUSANDS/ΜL (ref 1.85–7.62)
NEUTS SEG NFR BLD AUTO: 73 % (ref 43–75)
NRBC BLD AUTO-RTO: 0 /100 WBCS
PLATELET # BLD AUTO: 356 THOUSANDS/UL (ref 149–390)
PMV BLD AUTO: 10.1 FL (ref 8.9–12.7)
POTASSIUM SERPL-SCNC: 4.4 MMOL/L (ref 3.5–5.3)
PROT SERPL-MCNC: 7.7 G/DL (ref 6.4–8.2)
RBC # BLD AUTO: 5.01 MILLION/UL (ref 3.88–5.62)
SODIUM SERPL-SCNC: 138 MMOL/L (ref 136–145)
WBC # BLD AUTO: 10.43 THOUSAND/UL (ref 4.31–10.16)

## 2020-05-15 PROCEDURE — 99214 OFFICE O/P EST MOD 30 MIN: CPT | Performed by: INTERNAL MEDICINE

## 2020-05-15 PROCEDURE — 84702 CHORIONIC GONADOTROPIN TEST: CPT | Performed by: INTERNAL MEDICINE

## 2020-05-15 PROCEDURE — 4004F PT TOBACCO SCREEN RCVD TLK: CPT | Performed by: INTERNAL MEDICINE

## 2020-05-15 PROCEDURE — 80053 COMPREHEN METABOLIC PANEL: CPT | Performed by: INTERNAL MEDICINE

## 2020-05-15 PROCEDURE — 82105 ALPHA-FETOPROTEIN SERUM: CPT | Performed by: INTERNAL MEDICINE

## 2020-05-15 PROCEDURE — 73502 X-RAY EXAM HIP UNI 2-3 VIEWS: CPT

## 2020-05-15 PROCEDURE — 36415 COLL VENOUS BLD VENIPUNCTURE: CPT | Performed by: INTERNAL MEDICINE

## 2020-05-15 PROCEDURE — 85025 COMPLETE CBC W/AUTO DIFF WBC: CPT | Performed by: INTERNAL MEDICINE

## 2020-05-19 ENCOUNTER — HOSPITAL ENCOUNTER (INPATIENT)
Facility: HOSPITAL | Age: 45
LOS: 4 days | Discharge: HOME | DRG: 101 | End: 2020-05-23
Attending: PSYCHIATRY & NEUROLOGY | Admitting: PSYCHIATRY & NEUROLOGY
Payer: MEDICARE

## 2020-05-19 ENCOUNTER — APPOINTMENT (INPATIENT)
Dept: NEUROLOGY | Facility: CLINIC | Age: 45
DRG: 101 | End: 2020-05-19
Payer: MEDICARE

## 2020-05-19 LAB
ALBUMIN SERPL BCP-MCNC: 3.8 G/DL (ref 3.5–5)
ALP SERPL-CCNC: 82 U/L (ref 46–116)
ALT SERPL W P-5'-P-CCNC: 36 U/L (ref 12–78)
ANION GAP SERPL CALCULATED.3IONS-SCNC: 4 MMOL/L (ref 4–13)
AST SERPL W P-5'-P-CCNC: 15 U/L (ref 5–45)
BASOPHILS # BLD AUTO: 0.04 THOUSANDS/ΜL (ref 0–0.1)
BASOPHILS NFR BLD AUTO: 0 % (ref 0–1)
BILIRUB SERPL-MCNC: 0.91 MG/DL (ref 0.2–1)
BUN SERPL-MCNC: 11 MG/DL (ref 5–25)
CALCIUM SERPL-MCNC: 8.8 MG/DL (ref 8.3–10.1)
CHLORIDE SERPL-SCNC: 109 MMOL/L (ref 100–108)
CO2 SERPL-SCNC: 26 MMOL/L (ref 21–32)
CREAT SERPL-MCNC: 0.74 MG/DL (ref 0.6–1.3)
EOSINOPHIL # BLD AUTO: 0.09 THOUSAND/ΜL (ref 0–0.61)
EOSINOPHIL NFR BLD AUTO: 1 % (ref 0–6)
ERYTHROCYTE [DISTWIDTH] IN BLOOD BY AUTOMATED COUNT: 15.1 % (ref 11.6–15.1)
GFR SERPL CREATININE-BSD FRML MDRD: 113 ML/MIN/1.73SQ M
GLUCOSE SERPL-MCNC: 96 MG/DL (ref 65–140)
HCT VFR BLD AUTO: 42.3 % (ref 36.5–49.3)
HGB BLD-MCNC: 14.2 G/DL (ref 12–17)
IMM GRANULOCYTES # BLD AUTO: 0.05 THOUSAND/UL (ref 0–0.2)
IMM GRANULOCYTES NFR BLD AUTO: 1 % (ref 0–2)
LYMPHOCYTES # BLD AUTO: 2.17 THOUSANDS/ΜL (ref 0.6–4.47)
LYMPHOCYTES NFR BLD AUTO: 21 % (ref 14–44)
MCH RBC QN AUTO: 29.7 PG (ref 26.8–34.3)
MCHC RBC AUTO-ENTMCNC: 33.6 G/DL (ref 31.4–37.4)
MCV RBC AUTO: 89 FL (ref 82–98)
MONOCYTES # BLD AUTO: 0.56 THOUSAND/ΜL (ref 0.17–1.22)
MONOCYTES NFR BLD AUTO: 5 % (ref 4–12)
NEUTROPHILS # BLD AUTO: 7.45 THOUSANDS/ΜL (ref 1.85–7.62)
NEUTS SEG NFR BLD AUTO: 72 % (ref 43–75)
NRBC BLD AUTO-RTO: 0 /100 WBCS
PLATELET # BLD AUTO: 334 THOUSANDS/UL (ref 149–390)
PMV BLD AUTO: 10.5 FL (ref 8.9–12.7)
POTASSIUM SERPL-SCNC: 3.8 MMOL/L (ref 3.5–5.3)
PROT SERPL-MCNC: 7 G/DL (ref 6.4–8.2)
RBC # BLD AUTO: 4.78 MILLION/UL (ref 3.88–5.62)
SODIUM SERPL-SCNC: 139 MMOL/L (ref 136–145)
WBC # BLD AUTO: 10.36 THOUSAND/UL (ref 4.31–10.16)

## 2020-05-19 PROCEDURE — 95715 VEEG EA 12-26HR INTMT MNTR: CPT

## 2020-05-19 PROCEDURE — 99222 1ST HOSP IP/OBS MODERATE 55: CPT | Performed by: PSYCHIATRY & NEUROLOGY

## 2020-05-19 PROCEDURE — 94760 N-INVAS EAR/PLS OXIMETRY 1: CPT

## 2020-05-19 PROCEDURE — 80053 COMPREHEN METABOLIC PANEL: CPT | Performed by: NURSE PRACTITIONER

## 2020-05-19 PROCEDURE — 85025 COMPLETE CBC W/AUTO DIFF WBC: CPT | Performed by: NURSE PRACTITIONER

## 2020-05-19 PROCEDURE — 95700 EEG CONT REC W/VID EEG TECH: CPT

## 2020-05-19 RX ORDER — NICOTINE 21 MG/24HR
14 PATCH, TRANSDERMAL 24 HOURS TRANSDERMAL DAILY
Status: DISCONTINUED | OUTPATIENT
Start: 2020-05-19 | End: 2020-05-23 | Stop reason: HOSPADM

## 2020-05-19 RX ORDER — MELOXICAM 7.5 MG/1
15 TABLET ORAL DAILY PRN
Status: DISCONTINUED | OUTPATIENT
Start: 2020-05-19 | End: 2020-05-23 | Stop reason: HOSPADM

## 2020-05-19 RX ORDER — DIPHENHYDRAMINE HCL 25 MG
25 TABLET ORAL EVERY 6 HOURS PRN
Status: DISCONTINUED | OUTPATIENT
Start: 2020-05-19 | End: 2020-05-23 | Stop reason: HOSPADM

## 2020-05-19 RX ORDER — ACETAMINOPHEN 325 MG/1
650 TABLET ORAL EVERY 6 HOURS PRN
Status: DISCONTINUED | OUTPATIENT
Start: 2020-05-19 | End: 2020-05-23 | Stop reason: HOSPADM

## 2020-05-19 RX ORDER — LORAZEPAM 2 MG/ML
2 INJECTION INTRAMUSCULAR EVERY 8 HOURS PRN
Status: DISCONTINUED | OUTPATIENT
Start: 2020-05-19 | End: 2020-05-23 | Stop reason: HOSPADM

## 2020-05-19 RX ORDER — MELOXICAM 7.5 MG/1
15 TABLET ORAL DAILY
Status: DISCONTINUED | OUTPATIENT
Start: 2020-05-20 | End: 2020-05-19

## 2020-05-19 RX ORDER — NICOTINE 21 MG/24HR
1 PATCH, TRANSDERMAL 24 HOURS TRANSDERMAL DAILY
Status: DISCONTINUED | OUTPATIENT
Start: 2020-05-19 | End: 2020-05-19

## 2020-05-19 RX ADMIN — ENOXAPARIN SODIUM 40 MG: 40 INJECTION SUBCUTANEOUS at 10:14

## 2020-05-19 RX ADMIN — NICOTINE 14 MG: 14 PATCH TRANSDERMAL at 10:14

## 2020-05-19 RX ADMIN — MELOXICAM 15 MG: 7.5 TABLET ORAL at 18:46

## 2020-05-20 ENCOUNTER — APPOINTMENT (INPATIENT)
Dept: NEUROLOGY | Facility: CLINIC | Age: 45
DRG: 101 | End: 2020-05-20
Payer: MEDICARE

## 2020-05-20 PROCEDURE — 95720 EEG PHY/QHP EA INCR W/VEEG: CPT | Performed by: PSYCHIATRY & NEUROLOGY

## 2020-05-20 PROCEDURE — 95715 VEEG EA 12-26HR INTMT MNTR: CPT

## 2020-05-20 PROCEDURE — 99233 SBSQ HOSP IP/OBS HIGH 50: CPT | Performed by: PSYCHIATRY & NEUROLOGY

## 2020-05-20 RX ADMIN — MELOXICAM 15 MG: 7.5 TABLET ORAL at 17:35

## 2020-05-20 RX ADMIN — ENOXAPARIN SODIUM 40 MG: 40 INJECTION SUBCUTANEOUS at 08:01

## 2020-05-20 RX ADMIN — NICOTINE 14 MG: 14 PATCH TRANSDERMAL at 08:02

## 2020-05-20 RX ADMIN — ACETAMINOPHEN 650 MG: 325 TABLET ORAL at 08:01

## 2020-05-21 ENCOUNTER — APPOINTMENT (INPATIENT)
Dept: NEUROLOGY | Facility: CLINIC | Age: 45
DRG: 101 | End: 2020-05-21
Payer: MEDICARE

## 2020-05-21 LAB
BASOPHILS # BLD AUTO: 0.04 THOUSANDS/ΜL (ref 0–0.1)
BASOPHILS NFR BLD AUTO: 0 % (ref 0–1)
EOSINOPHIL # BLD AUTO: 0.12 THOUSAND/ΜL (ref 0–0.61)
EOSINOPHIL NFR BLD AUTO: 1 % (ref 0–6)
ERYTHROCYTE [DISTWIDTH] IN BLOOD BY AUTOMATED COUNT: 15 % (ref 11.6–15.1)
HCT VFR BLD AUTO: 43.8 % (ref 36.5–49.3)
HGB BLD-MCNC: 14.7 G/DL (ref 12–17)
IMM GRANULOCYTES # BLD AUTO: 0.03 THOUSAND/UL (ref 0–0.2)
IMM GRANULOCYTES NFR BLD AUTO: 0 % (ref 0–2)
LYMPHOCYTES # BLD AUTO: 2.76 THOUSANDS/ΜL (ref 0.6–4.47)
LYMPHOCYTES NFR BLD AUTO: 27 % (ref 14–44)
MCH RBC QN AUTO: 30.1 PG (ref 26.8–34.3)
MCHC RBC AUTO-ENTMCNC: 33.6 G/DL (ref 31.4–37.4)
MCV RBC AUTO: 90 FL (ref 82–98)
MONOCYTES # BLD AUTO: 0.72 THOUSAND/ΜL (ref 0.17–1.22)
MONOCYTES NFR BLD AUTO: 7 % (ref 4–12)
NEUTROPHILS # BLD AUTO: 6.53 THOUSANDS/ΜL (ref 1.85–7.62)
NEUTS SEG NFR BLD AUTO: 65 % (ref 43–75)
NRBC BLD AUTO-RTO: 0 /100 WBCS
PLATELET # BLD AUTO: 340 THOUSANDS/UL (ref 149–390)
PMV BLD AUTO: 10.7 FL (ref 8.9–12.7)
RBC # BLD AUTO: 4.89 MILLION/UL (ref 3.88–5.62)
WBC # BLD AUTO: 10.2 THOUSAND/UL (ref 4.31–10.16)

## 2020-05-21 PROCEDURE — 99233 SBSQ HOSP IP/OBS HIGH 50: CPT | Performed by: PSYCHIATRY & NEUROLOGY

## 2020-05-21 PROCEDURE — 95720 EEG PHY/QHP EA INCR W/VEEG: CPT | Performed by: PSYCHIATRY & NEUROLOGY

## 2020-05-21 PROCEDURE — 85025 COMPLETE CBC W/AUTO DIFF WBC: CPT | Performed by: NURSE PRACTITIONER

## 2020-05-21 PROCEDURE — 95715 VEEG EA 12-26HR INTMT MNTR: CPT

## 2020-05-21 RX ADMIN — NICOTINE 14 MG: 14 PATCH TRANSDERMAL at 09:08

## 2020-05-21 RX ADMIN — ENOXAPARIN SODIUM 40 MG: 40 INJECTION SUBCUTANEOUS at 09:07

## 2020-05-22 ENCOUNTER — APPOINTMENT (INPATIENT)
Dept: NEUROLOGY | Facility: CLINIC | Age: 45
DRG: 101 | End: 2020-05-22
Payer: MEDICARE

## 2020-05-22 PROCEDURE — 95720 EEG PHY/QHP EA INCR W/VEEG: CPT | Performed by: PSYCHIATRY & NEUROLOGY

## 2020-05-22 PROCEDURE — 99233 SBSQ HOSP IP/OBS HIGH 50: CPT | Performed by: PSYCHIATRY & NEUROLOGY

## 2020-05-22 PROCEDURE — 95715 VEEG EA 12-26HR INTMT MNTR: CPT

## 2020-05-22 RX ADMIN — ACETAMINOPHEN 650 MG: 325 TABLET ORAL at 12:07

## 2020-05-22 RX ADMIN — NICOTINE 14 MG: 14 PATCH TRANSDERMAL at 08:06

## 2020-05-22 RX ADMIN — ENOXAPARIN SODIUM 40 MG: 40 INJECTION SUBCUTANEOUS at 08:06

## 2020-05-23 VITALS
BODY MASS INDEX: 29.31 KG/M2 | OXYGEN SATURATION: 95 % | DIASTOLIC BLOOD PRESSURE: 71 MMHG | HEART RATE: 63 BPM | WEIGHT: 228.4 LBS | HEIGHT: 74 IN | SYSTOLIC BLOOD PRESSURE: 126 MMHG | TEMPERATURE: 99 F | RESPIRATION RATE: 18 BRPM

## 2020-05-23 PROCEDURE — 99239 HOSP IP/OBS DSCHRG MGMT >30: CPT | Performed by: PSYCHIATRY & NEUROLOGY

## 2020-05-23 RX ADMIN — ENOXAPARIN SODIUM 40 MG: 40 INJECTION SUBCUTANEOUS at 09:08

## 2020-05-23 RX ADMIN — NICOTINE 14 MG: 14 PATCH TRANSDERMAL at 09:08

## 2020-05-24 PROCEDURE — 95720 EEG PHY/QHP EA INCR W/VEEG: CPT | Performed by: PSYCHIATRY & NEUROLOGY

## 2020-05-26 ENCOUNTER — TRANSITIONAL CARE MANAGEMENT (OUTPATIENT)
Dept: INTERNAL MEDICINE CLINIC | Facility: CLINIC | Age: 45
End: 2020-05-26

## 2020-06-03 ENCOUNTER — TELEPHONE (OUTPATIENT)
Dept: NEUROLOGY | Facility: CLINIC | Age: 45
End: 2020-06-03

## 2020-06-04 ENCOUNTER — OFFICE VISIT (OUTPATIENT)
Dept: INTERNAL MEDICINE CLINIC | Facility: CLINIC | Age: 45
End: 2020-06-04
Payer: MEDICARE

## 2020-06-04 VITALS
BODY MASS INDEX: 29.52 KG/M2 | TEMPERATURE: 99.3 F | SYSTOLIC BLOOD PRESSURE: 134 MMHG | HEART RATE: 74 BPM | RESPIRATION RATE: 16 BRPM | HEIGHT: 74 IN | OXYGEN SATURATION: 98 % | WEIGHT: 230 LBS | DIASTOLIC BLOOD PRESSURE: 80 MMHG

## 2020-06-04 DIAGNOSIS — F41.1 GAD (GENERALIZED ANXIETY DISORDER): ICD-10-CM

## 2020-06-04 DIAGNOSIS — F79 MENTALLY CHALLENGED: Chronic | ICD-10-CM

## 2020-06-04 DIAGNOSIS — G89.29 CHRONIC PAIN OF RIGHT HIP: ICD-10-CM

## 2020-06-04 DIAGNOSIS — F44.5 PSEUDOSEIZURE: Primary | ICD-10-CM

## 2020-06-04 DIAGNOSIS — M25.551 CHRONIC PAIN OF RIGHT HIP: ICD-10-CM

## 2020-06-04 PROCEDURE — 99495 TRANSJ CARE MGMT MOD F2F 14D: CPT | Performed by: INTERNAL MEDICINE

## 2020-06-16 ENCOUNTER — CONSULT (OUTPATIENT)
Dept: OBGYN CLINIC | Facility: CLINIC | Age: 45
End: 2020-06-16
Payer: MEDICARE

## 2020-06-16 VITALS
BODY MASS INDEX: 29.66 KG/M2 | SYSTOLIC BLOOD PRESSURE: 126 MMHG | TEMPERATURE: 99.4 F | DIASTOLIC BLOOD PRESSURE: 84 MMHG | WEIGHT: 231 LBS

## 2020-06-16 DIAGNOSIS — M70.61 TROCHANTERIC BURSITIS OF RIGHT HIP: Primary | ICD-10-CM

## 2020-06-16 PROCEDURE — 20610 DRAIN/INJ JOINT/BURSA W/O US: CPT | Performed by: ORTHOPAEDIC SURGERY

## 2020-06-16 PROCEDURE — 99203 OFFICE O/P NEW LOW 30 MIN: CPT | Performed by: ORTHOPAEDIC SURGERY

## 2020-06-16 RX ORDER — BETAMETHASONE SODIUM PHOSPHATE AND BETAMETHASONE ACETATE 3; 3 MG/ML; MG/ML
6 INJECTION, SUSPENSION INTRA-ARTICULAR; INTRALESIONAL; INTRAMUSCULAR; SOFT TISSUE
Status: COMPLETED | OUTPATIENT
Start: 2020-06-16 | End: 2020-06-16

## 2020-06-16 RX ORDER — LIDOCAINE HYDROCHLORIDE 10 MG/ML
7 INJECTION, SOLUTION INFILTRATION; PERINEURAL
Status: COMPLETED | OUTPATIENT
Start: 2020-06-16 | End: 2020-06-16

## 2020-06-16 RX ADMIN — LIDOCAINE HYDROCHLORIDE 7 ML: 10 INJECTION, SOLUTION INFILTRATION; PERINEURAL at 13:39

## 2020-06-16 RX ADMIN — BETAMETHASONE SODIUM PHOSPHATE AND BETAMETHASONE ACETATE 6 MG: 3; 3 INJECTION, SUSPENSION INTRA-ARTICULAR; INTRALESIONAL; INTRAMUSCULAR; SOFT TISSUE at 13:39

## 2020-07-16 ENCOUNTER — OFFICE VISIT (OUTPATIENT)
Dept: INTERNAL MEDICINE CLINIC | Facility: CLINIC | Age: 45
End: 2020-07-16
Payer: MEDICARE

## 2020-07-16 VITALS
HEART RATE: 69 BPM | HEIGHT: 74 IN | TEMPERATURE: 97.5 F | DIASTOLIC BLOOD PRESSURE: 80 MMHG | SYSTOLIC BLOOD PRESSURE: 142 MMHG | WEIGHT: 228.5 LBS | BODY MASS INDEX: 29.33 KG/M2 | OXYGEN SATURATION: 98 %

## 2020-07-16 DIAGNOSIS — R56.9 NONEPILEPTIC EPISODE (HCC): Primary | ICD-10-CM

## 2020-07-16 DIAGNOSIS — C62.92 TESTICULAR SEMINOMA, LEFT (HCC): ICD-10-CM

## 2020-07-16 DIAGNOSIS — M25.551 CHRONIC PAIN OF RIGHT HIP: ICD-10-CM

## 2020-07-16 DIAGNOSIS — G89.29 CHRONIC PAIN OF RIGHT HIP: ICD-10-CM

## 2020-07-16 DIAGNOSIS — F79 MENTALLY CHALLENGED: Chronic | ICD-10-CM

## 2020-07-16 DIAGNOSIS — Z72.0 TOBACCO ABUSE: Chronic | ICD-10-CM

## 2020-07-16 PROCEDURE — 99214 OFFICE O/P EST MOD 30 MIN: CPT | Performed by: INTERNAL MEDICINE

## 2020-07-16 PROCEDURE — 1111F DSCHRG MED/CURRENT MED MERGE: CPT | Performed by: INTERNAL MEDICINE

## 2020-07-16 PROCEDURE — 4004F PT TOBACCO SCREEN RCVD TLK: CPT | Performed by: INTERNAL MEDICINE

## 2020-07-16 PROCEDURE — 3008F BODY MASS INDEX DOCD: CPT | Performed by: INTERNAL MEDICINE

## 2020-07-16 NOTE — PROGRESS NOTES
Assessment/Plan:  Problem List Items Addressed This Visit        Genitourinary    Testicular seminoma, left ([facility] [address] )       Other    Mentally challenged (Chronic)    Tobacco abuse (Chronic)    [facility]pile[facility] ([facility] ) - Primary    Chronic pain of right hip           Diagnoses and all orders for this visit:    Nonepileptic episode [facility])    Testicular seminoma, left ([facility] )    Chronic pain of right hip    Mentally challenged    Tobacco abuse        No problem-specific Assessment & Plan notes found for this encounter  A/P: Doing well and labs are up to date  Wean tobacco  Appreciate ortho input  Continue with home exercise  Keep trying to get into counseling  Continue current treatment and RTC six months for routine  Subjective:      Patient ID: Dorita Ann is a 38 y o  male  WM RTC for f/u sz, testicular seminoma, etc  Doing well and no new issues and was seen by ortho and received an injection for his hip pain  Doing better and no longer using a walker  Still trying to get into counseling  Testicular issues are good    Remains active w/o difficulty and no falls  No sz  Still smoking  Labs are up to date  The following portions of the patient's history were reviewed and updated as appropriate:   He has a past medical history of Cleft hard palate, Mass of left testicle, Seizures ([facility] [address] ), and Testicular mass (1/16/2020)  ,  does not have any pertinent problems on file  ,   has a past surgical history that includes Brain surgery; Cleft lip repair; pr removal testis,radical (Left, 1/20/2020); and Orchiectomy (Left)  ,  family history includes COPD in his mother; Cancer in his family; Heart disease in his father; Hypertension in his mother; Seizures in his father  ,   reports that he has been smoking cigarettes  He has been smoking about 0 50 packs per day  He has quit using smokeless tobacco  He reports that he drinks alcohol  He reports that he does not use drugs  ,  has No Known Allergies     Current Outpatient Medications   Medication Sig Dispense Refill    meloxicam (MOBIC) 15 mg tablet Take 1 tablet (15 mg total) by mouth daily 90 tablet 3     No current facility-administered medications for this visit  Review of Systems   Constitutional: Negative for activity change, chills, diaphoresis, fatigue and fever  HENT: Negative  Eyes: Negative for visual disturbance  Respiratory: Negative for cough, chest tightness, shortness of breath and wheezing  Cardiovascular: Negative for chest pain, palpitations and leg swelling  Gastrointestinal: Negative for abdominal pain, constipation, diarrhea, nausea and vomiting  Endocrine: Negative for cold intolerance and heat intolerance  Genitourinary: Negative for difficulty urinating, dysuria and frequency  Musculoskeletal: Negative for arthralgias, gait problem and myalgias  Neurological: Negative for dizziness, seizures, syncope, weakness, light-headedness and headaches  Psychiatric/Behavioral: Negative for confusion, dysphoric mood and sleep disturbance  The patient is not nervous/anxious  PHQ-9 Depression Screening    PHQ-9:    Frequency of the following problems over the past two weeks:             Objective:  Vitals:    07/16/20 0904   BP: 142/80   Pulse: 69   Temp: 97 5 °F (36 4 °C)   SpO2: 98%   Weight: 104 kg (228 lb 8 oz)   Height: 6' 2" (1 88 m)     Body mass index is 29 34 kg/m²  Physical Exam   Constitutional: He is oriented to person, place, and time  He appears well-developed and well-nourished  No distress  HENT:   Head: Normocephalic and atraumatic  Mouth/Throat: Oropharynx is clear and moist    Eyes: Pupils are equal, round, and reactive to light  Conjunctivae and EOM are normal    Neck: Neck supple  No JVD present  Cardiovascular: Normal rate, regular rhythm and normal heart sounds  Pulmonary/Chest: Effort normal and breath sounds normal  No respiratory distress  He has no wheezes  He has no rales  Abdominal: Soft   Bowel sounds are normal  He exhibits no distension  There is no tenderness  Musculoskeletal: He exhibits no edema  Neurological: He is alert and oriented to person, place, and time  Psychiatric: He has a normal mood and affect  His behavior is normal  Judgment and thought content normal    Nursing note and vitals reviewed

## 2020-07-16 NOTE — PATIENT INSTRUCTIONS

## 2020-08-11 ENCOUNTER — HOSPITAL ENCOUNTER (OUTPATIENT)
Dept: MRI IMAGING | Facility: HOSPITAL | Age: 45
Discharge: HOME | End: 2020-08-11
Payer: MEDICARE

## 2020-08-11 DIAGNOSIS — G93.0 ARACHNOID CYST: ICD-10-CM

## 2020-08-11 PROCEDURE — A9585 GADOBUTROL INJECTION: HCPCS | Performed by: PSYCHIATRY & NEUROLOGY

## 2020-08-11 PROCEDURE — 70553 MRI BRAIN STEM W/O & W/DYE: CPT

## 2020-08-11 RX ADMIN — GADOBUTROL 10 ML: 604.72 INJECTION INTRAVENOUS at 14:52

## 2020-08-17 ENCOUNTER — DOCUMENTATION (OUTPATIENT)
Dept: HEMATOLOGY ONCOLOGY | Facility: MEDICAL CENTER | Age: 45
End: 2020-08-17

## 2020-08-25 ENCOUNTER — APPOINTMENT (OUTPATIENT)
Dept: LAB | Facility: HOSPITAL | Age: 45
End: 2020-08-25
Attending: INTERNAL MEDICINE
Payer: MEDICARE

## 2020-08-31 ENCOUNTER — DOCUMENTATION (OUTPATIENT)
Dept: HEMATOLOGY ONCOLOGY | Facility: MEDICAL CENTER | Age: 45
End: 2020-08-31

## 2020-09-01 ENCOUNTER — OFFICE VISIT (OUTPATIENT)
Dept: HEMATOLOGY ONCOLOGY | Facility: CLINIC | Age: 45
End: 2020-09-01
Payer: MEDICARE

## 2020-09-01 VITALS
BODY MASS INDEX: 29.52 KG/M2 | TEMPERATURE: 97.6 F | HEIGHT: 74 IN | SYSTOLIC BLOOD PRESSURE: 126 MMHG | DIASTOLIC BLOOD PRESSURE: 74 MMHG | HEART RATE: 68 BPM | WEIGHT: 230 LBS | OXYGEN SATURATION: 98 % | RESPIRATION RATE: 17 BRPM

## 2020-09-01 DIAGNOSIS — C62.92 TESTICULAR SEMINOMA, LEFT (HCC): Primary | ICD-10-CM

## 2020-09-01 PROCEDURE — 99214 OFFICE O/P EST MOD 30 MIN: CPT | Performed by: NURSE PRACTITIONER

## 2020-09-01 NOTE — PROGRESS NOTES
[address] [facility] HEMATOLOGY ONCOLOGY [address] [facility]   [address]  [facility] [place] [address] [phone]  Progress Note  Srinivasan Queen, 1975, [id number]  9/1/2020    Assessment/Plan:  1  Testicular seminoma, left [facility])   Patient is a 71-year-old male with a history of stage IB testicular seminoma status post resection in January 2020  Patient has been on observation  We reviewed his most recent labs including AFP and HCG tumor markers which were normal   His CBC and CMP were unremarkable  Patient was previously scheduled to have a CT scan of the chest abdomen pelvis however this was not done I will therefore reorder this to be done in the near future  Should anything abnormal be noted on the CT scan I will call patient and address appropriately  Per the NCCN guidelines patient undergo H&P and labs every 3 months with a CT scan at 3, 6, and 12 months for the 1st year  We will see patient in 3 months with the following labs, Patient has a mother verbalized understanding     - CT chest abdomen pelvis w wo contrast; Future  - CBC and differential; Future  - AFP tumor marker; Future  - Comprehensive metabolic panel; Future  - hCG, quantitative; Future      The patient is scheduled for follow-up in approximately 3 months with Dr Booker or me  Patient and his mother voiced agreement and understanding to the above  Patient and his mother knows to call the Hematology/Oncology office with any questions and concerns regarding the above  Goals and Barriers:    Current Goal:   Prolong Survival from Cancer  Barriers: None        Patient's Capacity to Self Care:  Patient is able to self care   -------------------------------------------------------------------------------------------------------    Chief Complaint   Patient presents with    Follow-up       History of present illness/Cancer History:   Oncology History   Patient presents today for radiation consult for testicular cancer, referred by Dr Francy Cleveland  38year old male with a history of developmental delay,  shunt placed at birth  He was evaluated by his PCP in January 2020 for scrotal swelling  US revealed left testicular mass, he was referred to Urology  1/15/20 US scrotum and testicles  IMPRESSION:   Markedly enlarged and abnormal left testicle with normal vascular flow on Doppler interrogation  The finding raises the suspicion of infiltrating process and possibly testicular malignancy (perhaps testicular lymphoma) especially given the history of slowly progressive testicular swelling  FINDINGS:   TESTES:    RIGHT testis = 4 5 x 1 7 x 2 8 cm   The right testicle demonstrates normal contour with homogeneous smooth echotexture  No right-sided intratesticular mass lesion or calcifications    LEFT testis = 15 1 x 9 7 x 14 0 cm  The left testicle is markedly enlarged with profoundly heterogeneous echotexture      1/17/20 Urology, Dr Luis Navarro for left radical orchiectomy and staging imaging  1/20/20 Left radical orchiectomy     2/3/20 Urology follow-up, Dr Francy Cleveland  Reviewed pathology, left testicular seminoma, 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  Scrotal skin was negative for tumor involvement, no tumor was seen at the spermatic cord or the spermatic cord margin, and no penetration through the tunica vaginalis was seen  Plan for CT abd/pelvis and chest xray, recheck lab work  Follow-up to review and referral at that time  2/5/20 Bloodwork:  AFP tumor marker: 5 0  HCG tumor marker: < 1  HCG, Quantitative: < 0 6      2/7/20 CT Abd/Pelvis  IMPRESSION:   1   No evidence of metastatic disease in the abdomen or pelvis    2   Large collection in the left hemiscrotum with central hyperdensity likely representing hematoma  Clinical correlation recommended    3   Postoperative stranding in the left inguinal region with prominent lymph nodes which are likely reactive    4   Cholelithiasis       2/7/20 XR chest pa & lateral  IMPRESSION:   No acute cardiopulmonary disease  20 Urology f/u with Dr Mendez Lopez  CT negative for metastases, HCG and AFP markers are negative  Refer to Radiation Oncology for consideration of hockey-stick adjuvant radiation therapy  Scrotal hematoma mostly resolved, no signs of infection, follow-up in 3 months  20 Multidisciplinary Urology Case Review  Physician Recommended Plan:  Referral to Radiation Oncology and Medical Oncology        20 Dr Kaur Juarez  Favor observation  Prophylactic radiation therapy or chemotherapy are reasonable to consider  Reviewed that likelihood of cure is high, observation is reasonable with a goal of avoiding unnecessary treatment  If recurrent, salvage is generally quite effective  Pt and mother prefer avoiding therapy unless necessary  Plan for follow-up in May with repeat imaging and bloodwork to initiate surveillance  20 CT abd/pelvis and CXR  20 Dr Mendez Lopez Urology follow-up  5/15/20 Guru Casillas follow-up           Testicular seminoma, left [facility])   2020 Initial Diagnosis    Testicular seminoma, left ([facility] [address] )     2020 Surgery    Left radical orchiectomy inguinal, patrial scrotectomy (Left)-combination of inguinal and scrotal approach due to size     A  Left testis and spermatic cord:  - Seminoma, 15 5 cm in greatest dimension, with extension beyond tunica albuginea as evidenced by tumor in epididymis and in hilar fat  No penetration through tunica vaginalis is appreciated  - No tumor seen within spermatic cord or at spermatic cord margin  - See synoptic report below     B  Scrotal skin:  - No tumor seen              Cancer Staging  No matching staging information was found for the patient  ECO - Asymptomatic     Review of Systems   Constitutional: Negative for activity change, appetite change, fatigue, fever and unexpected weight change  Respiratory: Negative for cough and shortness of breath  Cardiovascular: Negative for chest pain and leg swelling  Gastrointestinal: Negative for abdominal pain, constipation, diarrhea and nausea  Endocrine: Negative for cold intolerance and heat intolerance  Musculoskeletal: Negative for arthralgias and myalgias  Skin: Negative  Neurological: Negative for dizziness, weakness and headaches  Hematological: Negative for adenopathy  Does not bruise/bleed easily  Current Outpatient Medications:     meloxicam (MOBIC) 15 mg tablet, Take 1 tablet (15 mg total) by mouth daily, Disp: 90 tablet, Rfl: 3    No Known Allergies    Advance Directive and Living Will:        Objective:   /74 (BP Location: Right arm, Patient Position: Sitting)   Pulse 68   Temp 97 6 °F (36 4 °C) (Tympanic)   Resp 17   Ht 6' 2" (1 88 m)   Wt 104 kg (230 lb)   SpO2 98%   BMI 29 53 kg/m²   Wt Readings from Last 6 Encounters:   09/01/20 104 kg (230 lb)   07/16/20 104 kg (228 lb 8 oz)   06/16/20 105 kg (231 lb)   06/04/20 104 kg (230 lb)   05/19/20 104 kg (228 lb 6 3 oz)   02/21/20 98 4 kg (217 lb)       Physical Exam  Constitutional:       Appearance: Normal appearance  He is well-developed  HENT:      Head: Normocephalic and atraumatic  Eyes:      Pupils: Pupils are equal, round, and reactive to light  Neck:      Musculoskeletal: Normal range of motion  Cardiovascular:      Rate and Rhythm: Normal rate and regular rhythm  Heart sounds: Normal heart sounds  Pulmonary:      Effort: Pulmonary effort is normal  No respiratory distress  Breath sounds: Normal breath sounds  Abdominal:      General: Bowel sounds are normal       Palpations: Abdomen is soft  Musculoskeletal: Normal range of motion  Lymphadenopathy:      Cervical: No cervical adenopathy  Skin:     General: Skin is warm and dry  Capillary Refill: Capillary refill takes less than 2 seconds  Neurological:      Mental Status: He is alert and oriented to person, place, and time  Psychiatric:         Behavior: Behavior normal          Pertinent Laboratory Results and Imaging Review:  Ancillary Orders on 08/07/2020   Component Date Value Ref Range Status    WBC 08/25/2020 13 88* 4 31 - 10 16 Thousand/uL Final    RBC 08/25/2020 4 89  3 88 - 5 62 Million/uL Final    Hemoglobin 08/25/2020 15 2  12 0 - 17 0 g/dL Final    Hematocrit 08/25/2020 44 9  36 5 - 49 3 % Final    MCV 08/25/2020 92  82 - 98 fL Final    MCH 08/25/2020 31 1  26 8 - 34 3 pg Final    MCHC 08/25/2020 33 9  31 4 - 37 4 g/dL Final    RDW 08/25/2020 13 6  11 6 - 15 1 % Final    MPV 08/25/2020 9 7  8 9 - 12 7 fL Final    Platelets 77/99/8388 329  149 - 390 Thousands/uL Final    nRBC 08/25/2020 0  /100 WBCs Final    Neutrophils Relative 08/25/2020 70  43 - 75 % Final    Immat GRANS % 08/25/2020 0  0 - 2 % Final    Lymphocytes Relative 08/25/2020 22  14 - 44 % Final    Monocytes Relative 08/25/2020 6  4 - 12 % Final    Eosinophils Relative 08/25/2020 2  0 - 6 % Final    Basophils Relative 08/25/2020 0  0 - 1 % Final    Neutrophils Absolute 08/25/2020 9 73* 1 85 - 7 62 Thousands/µL Final    Immature Grans Absolute 08/25/2020 0 04  0 00 - 0 20 Thousand/uL Final    Lymphocytes Absolute 08/25/2020 3 02  0 60 - 4 47 Thousands/µL Final    Monocytes Absolute 08/25/2020 0 84  0 17 - 1 22 Thousand/µL Final    Eosinophils Absolute 08/25/2020 0 21  0 00 - 0 61 Thousand/µL Final    Basophils Absolute 08/25/2020 0 04  0 00 - 0 10 Thousands/µL Final    Sodium 08/25/2020 140  136 - 145 mmol/L Final    Potassium 08/25/2020 3 8  3 5 - 5 3 mmol/L Final    Chloride 08/25/2020 105  100 - 108 mmol/L Final    CO2 08/25/2020 29  21 - 32 mmol/L Final    ANION GAP 08/25/2020 6  4 - 13 mmol/L Final    BUN 08/25/2020 17  5 - 25 mg/dL Final    Creatinine 08/25/2020 0 94  0 60 - 1 30 mg/dL Final    Standardized to IDMS reference method    Glucose, Fasting 08/25/2020 101* 65 - 99 mg/dL Final    Specimen collection should occur prior to Sulfasalazine administration due to the potential for falsely depressed results  Specimen collection should occur prior to Sulfapyridine administration due to the potential for falsely elevated results   Calcium 08/25/2020 9 1  8 3 - 10 1 mg/dL Final    AST 08/25/2020 26  5 - 45 U/L Final    Specimen collection should occur prior to Sulfasalazine administration due to the potential for falsely depressed results   ALT 08/25/2020 55  12 - 78 U/L Final    Specimen collection should occur prior to Sulfasalazine administration due to the potential for falsely depressed results   Alkaline Phosphatase 08/25/2020 97  46 - 116 U/L Final    Total Protein 08/25/2020 7 3  6 4 - 8 2 g/dL Final    Albumin 08/25/2020 3 7  3 5 - 5 0 g/dL Final    Total Bilirubin 08/25/2020 0 90  0 20 - 1 00 mg/dL Final    Use of this assay is not recommended for patients undergoing treatment with eltrombopag due to the potential for falsely elevated results   eGFR 08/25/2020 99  ml/min/1 73sq m Final    AFP TUMOR MARKER 08/25/2020 4 4  0 5 - 8 ng/mL Final    hCG Tumor Marker 08/25/2020 <2  mlU/mL Final         The following historical data was reviewed      Past Medical History:   Diagnosis Date    Cleft hard palate     Mass of left testicle     Seizures ([address] )     Testicular mass 1/16/2020    Added automatically from request for surgery [id number]       Past Surgical History:   Procedure Laterality Date    BRAIN SURGERY      CLEFT LIP REPAIR      ORCHIECTOMY Left     [facility] REMOVAL TESTIS,RADICAL Left 1/20/2020    Procedure: LEFT RADICAL ORCHIECTOMY INGUINAL, patrial scrotectomy;  Surgeon: Audelia Foster MD;  Location: [facility];  Service: Urology       Social History     Socioeconomic History    Marital status: Single     Spouse name: Not on file    Number of children: Not on file    Years of education: Not on file    Highest education level: Not on file   Occupational History    Occupation: disabled   Social Needs  Financial resource strain: Not on file    Food insecurity     Worry: Not on file     Inability: Not on file   [employer] needs     Medical: Not on file     Non-medical: Not on file   Tobacco Use    Smoking status: Current Every Day Smoker     Packs/day: 0 50     Types: Cigarettes    Smokeless tobacco: Former User   Substance and Sexual Activity    Alcohol use: Yes     Comment: rare    Drug use: Never    Sexual activity: Not Currently   Lifestyle    Physical activity     Days per week: Not on file     Minutes per session: Not on file    Stress: Not on file   Relationships    Social connections     Talks on phone: Not on file     Gets together: Not on file     Attends [employer] service: Not on file     Active member of club or organization: Not on file     Attends meetings of clubs or organizations: Not on file     Relationship status: Not on file    Intimate partner violence     Fear of current or ex partner: Not on file     Emotionally abused: Not on file     Physically abused: Not on file     Forced sexual activity: Not on file   Other Topics Concern    Not on file   Social History Narrative    Disabled    Single    No children    Lives with his mother  Family History   Problem Relation Age of Onset    Hypertension Mother     COPD Mother     Seizures Father     Heart disease Father     Cancer Family        Please note: This report has been generated by a voice recognition software system  Therefore there may be syntax, spelling, and/or grammatical errors  Please call if you have any questions

## 2020-11-24 ENCOUNTER — HOSPITAL ENCOUNTER (OUTPATIENT)
Dept: CT IMAGING | Facility: HOSPITAL | Age: 45
Discharge: HOME | End: 2020-11-24
Payer: MEDICARE

## 2020-11-24 DIAGNOSIS — C62.92 TESTICULAR SEMINOMA, LEFT (HCC): ICD-10-CM

## 2020-11-24 PROCEDURE — 74177 CT ABD & PELVIS W/CONTRAST: CPT

## 2020-11-24 PROCEDURE — 71260 CT THORAX DX C+: CPT

## 2020-11-24 PROCEDURE — G1004 CDSM NDSC: HCPCS

## 2020-11-24 RX ADMIN — IOHEXOL 100 ML: 350 INJECTION, SOLUTION INTRAVENOUS at 14:32

## 2020-12-03 ENCOUNTER — TELEPHONE (OUTPATIENT)
Dept: HEMATOLOGY ONCOLOGY | Facility: CLINIC | Age: 45
End: 2020-12-03

## 2020-12-10 ENCOUNTER — TELEPHONE (OUTPATIENT)
Dept: HEMATOLOGY ONCOLOGY | Facility: CLINIC | Age: 45
End: 2020-12-10

## 2020-12-11 ENCOUNTER — PREP FOR PROCEDURE (OUTPATIENT)
Dept: INTERVENTIONAL RADIOLOGY/VASCULAR | Facility: CLINIC | Age: 45
End: 2020-12-11

## 2020-12-11 ENCOUNTER — OFFICE VISIT (OUTPATIENT)
Dept: HEMATOLOGY ONCOLOGY | Facility: CLINIC | Age: 45
End: 2020-12-11
Payer: MEDICARE

## 2020-12-11 VITALS
TEMPERATURE: 98 F | HEIGHT: 74 IN | WEIGHT: 252.4 LBS | SYSTOLIC BLOOD PRESSURE: 141 MMHG | BODY MASS INDEX: 32.39 KG/M2 | DIASTOLIC BLOOD PRESSURE: 80 MMHG | HEART RATE: 67 BPM

## 2020-12-11 DIAGNOSIS — C62.92 TESTICULAR SEMINOMA, LEFT (HCC): Primary | ICD-10-CM

## 2020-12-11 DIAGNOSIS — R59.0 RETROPERITONEAL LYMPHADENOPATHY: Primary | ICD-10-CM

## 2020-12-11 PROCEDURE — 99215 OFFICE O/P EST HI 40 MIN: CPT | Performed by: INTERNAL MEDICINE

## 2020-12-11 RX ORDER — SODIUM CHLORIDE 9 MG/ML
75 INJECTION, SOLUTION INTRAVENOUS CONTINUOUS
Status: CANCELLED | OUTPATIENT
Start: 2020-12-11

## 2020-12-14 ENCOUNTER — TELEPHONE (OUTPATIENT)
Dept: INTERVENTIONAL RADIOLOGY/VASCULAR | Facility: HOSPITAL | Age: 45
End: 2020-12-14

## 2020-12-15 ENCOUNTER — TELEPHONE (OUTPATIENT)
Dept: INTERVENTIONAL RADIOLOGY/VASCULAR | Facility: HOSPITAL | Age: 45
End: 2020-12-15

## 2020-12-21 ENCOUNTER — TELEPHONE (OUTPATIENT)
Dept: HEMATOLOGY ONCOLOGY | Facility: CLINIC | Age: 45
End: 2020-12-21

## 2020-12-22 ENCOUNTER — HOSPITAL ENCOUNTER (OUTPATIENT)
Dept: CT IMAGING | Facility: HOSPITAL | Age: 45
Discharge: HOME | End: 2020-12-22
Attending: RADIOLOGY | Admitting: RADIOLOGY
Payer: MEDICARE

## 2020-12-22 VITALS
OXYGEN SATURATION: 98 % | RESPIRATION RATE: 20 BRPM | HEART RATE: 65 BPM | SYSTOLIC BLOOD PRESSURE: 150 MMHG | WEIGHT: 252 LBS | TEMPERATURE: 97.1 F | HEIGHT: 74 IN | DIASTOLIC BLOOD PRESSURE: 74 MMHG | BODY MASS INDEX: 32.34 KG/M2

## 2020-12-22 DIAGNOSIS — R59.0 RETROPERITONEAL LYMPHADENOPATHY: ICD-10-CM

## 2020-12-22 LAB
ERYTHROCYTE [DISTWIDTH] IN BLOOD BY AUTOMATED COUNT: 13.2 % (ref 11.6–15.1)
HCT VFR BLD AUTO: 43.3 % (ref 36.5–49.3)
HGB BLD-MCNC: 14.6 G/DL (ref 12–17)
INR PPP: 0.94 (ref 0.84–1.19)
MCH RBC QN AUTO: 30.4 PG (ref 26.8–34.3)
MCHC RBC AUTO-ENTMCNC: 33.7 G/DL (ref 31.4–37.4)
MCV RBC AUTO: 90 FL (ref 82–98)
PLATELET # BLD AUTO: 359 THOUSANDS/UL (ref 149–390)
PMV BLD AUTO: 9.8 FL (ref 8.9–12.7)
PROTHROMBIN TIME: 12.4 SECONDS (ref 11.6–14.5)
RBC # BLD AUTO: 4.8 MILLION/UL (ref 3.88–5.62)
WBC # BLD AUTO: 11.32 THOUSAND/UL (ref 4.31–10.16)

## 2020-12-22 PROCEDURE — 99153 MOD SED SAME PHYS/QHP EA: CPT

## 2020-12-22 PROCEDURE — 77012 CT SCAN FOR NEEDLE BIOPSY: CPT | Performed by: RADIOLOGY

## 2020-12-22 PROCEDURE — 49180 BIOPSY ABDOMINAL MASS: CPT

## 2020-12-22 PROCEDURE — 88185 FLOWCYTOMETRY/TC ADD-ON: CPT

## 2020-12-22 PROCEDURE — 85610 PROTHROMBIN TIME: CPT | Performed by: RADIOLOGY

## 2020-12-22 PROCEDURE — 99152 MOD SED SAME PHYS/QHP 5/>YRS: CPT | Performed by: RADIOLOGY

## 2020-12-22 PROCEDURE — 49180 BIOPSY ABDOMINAL MASS: CPT | Performed by: RADIOLOGY

## 2020-12-22 PROCEDURE — 85027 COMPLETE CBC AUTOMATED: CPT | Performed by: RADIOLOGY

## 2020-12-22 PROCEDURE — 77012 CT SCAN FOR NEEDLE BIOPSY: CPT

## 2020-12-22 PROCEDURE — 88341 IMHCHEM/IMCYTCHM EA ADD ANTB: CPT | Performed by: PATHOLOGY

## 2020-12-22 PROCEDURE — 88307 TISSUE EXAM BY PATHOLOGIST: CPT | Performed by: PATHOLOGY

## 2020-12-22 PROCEDURE — 99152 MOD SED SAME PHYS/QHP 5/>YRS: CPT

## 2020-12-22 PROCEDURE — 88342 IMHCHEM/IMCYTCHM 1ST ANTB: CPT | Performed by: PATHOLOGY

## 2020-12-22 PROCEDURE — 88184 FLOWCYTOMETRY/ TC 1 MARKER: CPT | Performed by: INTERNAL MEDICINE

## 2020-12-22 RX ORDER — LIDOCAINE HYDROCHLORIDE 10 MG/ML
INJECTION, SOLUTION EPIDURAL; INFILTRATION; INTRACAUDAL; PERINEURAL CODE/TRAUMA/SEDATION MEDICATION
Status: COMPLETED | OUTPATIENT
Start: 2020-12-22 | End: 2020-12-22

## 2020-12-22 RX ORDER — FENTANYL CITRATE 50 UG/ML
INJECTION, SOLUTION INTRAMUSCULAR; INTRAVENOUS CODE/TRAUMA/SEDATION MEDICATION
Status: COMPLETED | OUTPATIENT
Start: 2020-12-22 | End: 2020-12-22

## 2020-12-22 RX ORDER — SODIUM CHLORIDE 9 MG/ML
75 INJECTION, SOLUTION INTRAVENOUS CONTINUOUS
Status: DISCONTINUED | OUTPATIENT
Start: 2020-12-22 | End: 2020-12-23 | Stop reason: HOSPADM

## 2020-12-22 RX ORDER — MIDAZOLAM HYDROCHLORIDE 2 MG/2ML
INJECTION, SOLUTION INTRAMUSCULAR; INTRAVENOUS CODE/TRAUMA/SEDATION MEDICATION
Status: COMPLETED | OUTPATIENT
Start: 2020-12-22 | End: 2020-12-22

## 2020-12-22 RX ADMIN — LIDOCAINE HYDROCHLORIDE 10 ML: 10 INJECTION, SOLUTION EPIDURAL; INFILTRATION; INTRACAUDAL; PERINEURAL at 12:05

## 2020-12-22 RX ADMIN — MIDAZOLAM HYDROCHLORIDE 0.5 MG: 1 INJECTION, SOLUTION INTRAMUSCULAR; INTRAVENOUS at 11:56

## 2020-12-22 RX ADMIN — FENTANYL CITRATE 25 MCG: 50 INJECTION, SOLUTION INTRAMUSCULAR; INTRAVENOUS at 12:09

## 2020-12-22 RX ADMIN — SODIUM CHLORIDE 75 ML/HR: 0.9 INJECTION, SOLUTION INTRAVENOUS at 11:23

## 2020-12-22 RX ADMIN — FENTANYL CITRATE 50 MCG: 50 INJECTION, SOLUTION INTRAMUSCULAR; INTRAVENOUS at 12:16

## 2020-12-24 LAB
Lab: 5535
Lab: NORMAL
Lab: NORMAL
MISCELLANEOUS LAB TEST RESULT: NORMAL

## 2021-01-08 ENCOUNTER — DOCUMENTATION (OUTPATIENT)
Dept: HEMATOLOGY ONCOLOGY | Facility: CLINIC | Age: 46
End: 2021-01-08

## 2021-01-08 ENCOUNTER — APPOINTMENT (OUTPATIENT)
Dept: LAB | Facility: HOSPITAL | Age: 46
End: 2021-01-08
Payer: MEDICARE

## 2021-01-08 ENCOUNTER — OFFICE VISIT (OUTPATIENT)
Dept: HEMATOLOGY ONCOLOGY | Facility: CLINIC | Age: 46
End: 2021-01-08
Payer: MEDICARE

## 2021-01-08 VITALS
WEIGHT: 261.8 LBS | TEMPERATURE: 98.6 F | HEART RATE: 69 BPM | HEIGHT: 74 IN | SYSTOLIC BLOOD PRESSURE: 137 MMHG | BODY MASS INDEX: 33.6 KG/M2 | DIASTOLIC BLOOD PRESSURE: 73 MMHG

## 2021-01-08 DIAGNOSIS — C62.92 TESTICULAR SEMINOMA, LEFT (HCC): ICD-10-CM

## 2021-01-08 DIAGNOSIS — T45.1X5A CHEMOTHERAPY INDUCED NAUSEA AND VOMITING: ICD-10-CM

## 2021-01-08 DIAGNOSIS — R11.2 CHEMOTHERAPY INDUCED NAUSEA AND VOMITING: ICD-10-CM

## 2021-01-08 DIAGNOSIS — Z76.89 PREVENTION OF CHEMOTHERAPY-INDUCED NEUTROPENIA: ICD-10-CM

## 2021-01-08 DIAGNOSIS — C62.92 TESTICULAR SEMINOMA, LEFT (HCC): Primary | ICD-10-CM

## 2021-01-08 LAB
AFP-TM SERPL-MCNC: 5 NG/ML (ref 0.5–8)
ALBUMIN SERPL BCP-MCNC: 3.8 G/DL (ref 3.5–5)
ALP SERPL-CCNC: 96 U/L (ref 46–116)
ALT SERPL W P-5'-P-CCNC: 48 U/L (ref 12–78)
ANION GAP SERPL CALCULATED.3IONS-SCNC: 11 MMOL/L (ref 4–13)
AST SERPL W P-5'-P-CCNC: 23 U/L (ref 5–45)
BASOPHILS # BLD AUTO: 0.06 THOUSANDS/ΜL (ref 0–0.1)
BASOPHILS NFR BLD AUTO: 1 % (ref 0–1)
BILIRUB SERPL-MCNC: 0.5 MG/DL (ref 0.2–1)
BUN SERPL-MCNC: 14 MG/DL (ref 5–25)
CALCIUM SERPL-MCNC: 9.4 MG/DL (ref 8.3–10.1)
CHLORIDE SERPL-SCNC: 103 MMOL/L (ref 100–108)
CO2 SERPL-SCNC: 24 MMOL/L (ref 21–32)
CREAT SERPL-MCNC: 0.95 MG/DL (ref 0.6–1.3)
EOSINOPHIL # BLD AUTO: 0.16 THOUSAND/ΜL (ref 0–0.61)
EOSINOPHIL NFR BLD AUTO: 2 % (ref 0–6)
ERYTHROCYTE [DISTWIDTH] IN BLOOD BY AUTOMATED COUNT: 13.7 % (ref 11.6–15.1)
GFR SERPL CREATININE-BSD FRML MDRD: 97 ML/MIN/1.73SQ M
GLUCOSE SERPL-MCNC: 99 MG/DL (ref 65–140)
HCG-TM SERPL-SCNC: <2 MLU/ML
HCT VFR BLD AUTO: 44 % (ref 36.5–49.3)
HGB BLD-MCNC: 14.8 G/DL (ref 12–17)
IMM GRANULOCYTES # BLD AUTO: 0.04 THOUSAND/UL (ref 0–0.2)
IMM GRANULOCYTES NFR BLD AUTO: 0 % (ref 0–2)
LDH SERPL-CCNC: 224 U/L (ref 81–234)
LYMPHOCYTES # BLD AUTO: 2.55 THOUSANDS/ΜL (ref 0.6–4.47)
LYMPHOCYTES NFR BLD AUTO: 25 % (ref 14–44)
MCH RBC QN AUTO: 30.6 PG (ref 26.8–34.3)
MCHC RBC AUTO-ENTMCNC: 33.6 G/DL (ref 31.4–37.4)
MCV RBC AUTO: 91 FL (ref 82–98)
MONOCYTES # BLD AUTO: 0.64 THOUSAND/ΜL (ref 0.17–1.22)
MONOCYTES NFR BLD AUTO: 6 % (ref 4–12)
NEUTROPHILS # BLD AUTO: 6.87 THOUSANDS/ΜL (ref 1.85–7.62)
NEUTS SEG NFR BLD AUTO: 66 % (ref 43–75)
NRBC BLD AUTO-RTO: 0 /100 WBCS
PLATELET # BLD AUTO: 342 THOUSANDS/UL (ref 149–390)
PMV BLD AUTO: 9.8 FL (ref 8.9–12.7)
POTASSIUM SERPL-SCNC: 4.1 MMOL/L (ref 3.5–5.3)
PROT SERPL-MCNC: 7.6 G/DL (ref 6.4–8.2)
RBC # BLD AUTO: 4.83 MILLION/UL (ref 3.88–5.62)
SODIUM SERPL-SCNC: 138 MMOL/L (ref 136–145)
WBC # BLD AUTO: 10.32 THOUSAND/UL (ref 4.31–10.16)

## 2021-01-08 PROCEDURE — 36415 COLL VENOUS BLD VENIPUNCTURE: CPT | Performed by: NURSE PRACTITIONER

## 2021-01-08 PROCEDURE — 83615 LACTATE (LD) (LDH) ENZYME: CPT

## 2021-01-08 PROCEDURE — 85025 COMPLETE CBC W/AUTO DIFF WBC: CPT | Performed by: NURSE PRACTITIONER

## 2021-01-08 PROCEDURE — 80053 COMPREHEN METABOLIC PANEL: CPT

## 2021-01-08 PROCEDURE — 99215 OFFICE O/P EST HI 40 MIN: CPT | Performed by: NURSE PRACTITIONER

## 2021-01-08 PROCEDURE — 84702 CHORIONIC GONADOTROPIN TEST: CPT

## 2021-01-08 PROCEDURE — 82105 ALPHA-FETOPROTEIN SERUM: CPT

## 2021-01-08 NOTE — PROGRESS NOTES
Chemo teaching provided to Julio Fallon and his brother  Reviewed action, s/e and when to call with adverse reactions  Written material provided  Chemo consent signed

## 2021-01-08 NOTE — PROGRESS NOTES
recvd email from [address] wanting me to check to see if medicare is primary called medicare automated system & pt has active medicare A & B effective 08/01/06 medicare is primary & medicaid is secondary  Checked the promise website & pt has active pa health & wellness community healthchoices under recipient id# [id number]  I checked back to 09/01/20 & it comes up active   Emailed miryam that info

## 2021-01-08 NOTE — PROGRESS NOTES
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[facility])   Patient is a 60-year-old male with a history of stage IB seminoma who underwent a left radical orchiectomy in January 2020  Patient has been on observation since February 2020  CT scan from 11/24/2020 revealed small but enlarging left christ aortic lymph nodes suspicious for metastatic disease,  borderline and mildly enlarged medial left inguinal lymph nodes in the region of the orchidectomy site, scattered pulmonary nodules measuring 2-3 mm that are indeterminate  Patient underwent a needle biopsy of the retroperitoneal lymph node which revealed metastatic seminoma  Patient is here with his brother to review the above and discuss treatment options  Given there are multiple sites of lymphadenopathy radiation therapy is not appropriate therefore we discussed systemic therapy of etoposide 100 mg per m2 and cisplatin 20 mg per m2 day 1 through 5 every 21 days  We reviewed indications and adverse reactions including but not limited to nausea vomiting diarrhea, myelosuppression, hair loss, fatigue, taste changes and peripheral neuropathy  Our chemotherapy nurse Sheryl Matos RN reviewed the above with patient and obtained consent  We will obtain the following tumor markers prior to chemotherapy starting  We will monitor patient's CBC on a weekly basis and manage as indicated  We will make arrangements for his chemotherapy to start within the next 2 weeks plan to see patient in 4 weeks just prior to his next cycle  Patient and his brother verbalized understanding and are agreement with the plan  - AFP tumor marker; Future  - HCG, tumor marker; Future  - LD,Blood;  Future  - Infusion Calculated Appointment Request; Future  - CBC and differential; Standing  - CBC and differential  - Infusion Calculated Appointment Request; Future  - Infusion Calculated Appointment Request; Future  - Infusion Calculated Appointment Request; Future  - Infusion Calculated Appointment Request; Future  - Infusion Calculated Appointment Request; Future  - Infusion Calculated Appointment Request; Future  - Infusion Calculated Appointment Request; Future  - Infusion Calculated Appointment Request; Future  - Infusion Calculated Appointment Request; Future  - Infusion Calculated Appointment Request; Future  - Infusion Calculated Appointment Request; Future  - Infusion Calculated Appointment Request; Future  - Infusion Calculated Appointment Request; Future  - Infusion Calculated Appointment Request; Future      The patient is scheduled for follow-up in approximately 4 weeks with Dr Yari Sheppard or me  Patient and his brother voiced agreement and understanding to the above  Patient and his brother knows to call the Hematology/Oncology office with any questions and concerns regarding the above  I have spent 40 minutes with Patient and family today in which greater than 50% of this time was spent in counseling/coordination of care regarding Diagnostic results, Intructions for management and Chemotherapy etoposide and cisplatin indications and side effects  Goals and Barriers:    Current Goal:   Prolong Survival from Cancer  Barriers: None  Patient's Capacity to Self Care:  Patient is able to self care   -------------------------------------------------------------------------------------------------------    Chief Complaint   Patient presents with    Follow-up       History of present illness/Cancer History:   Oncology History Overview Note   Patient presents today for radiation consult for testicular cancer, referred by Dr Tomasz Mcrae  38year old male with a history of developmental delay,  shunt placed at birth  He was evaluated by his PCP in January 2020 for scrotal swelling   US revealed left testicular mass, he was referred to Urology  1/15/20 US scrotum and testicles  IMPRESSION:   Markedly enlarged and abnormal left testicle with normal vascular flow on Doppler interrogation  The finding raises the suspicion of infiltrating process and possibly testicular malignancy (perhaps testicular lymphoma) especially given the history of slowly progressive testicular swelling  FINDINGS:   TESTES:    RIGHT testis = 4 5 x 1 7 x 2 8 cm   The right testicle demonstrates normal contour with homogeneous smooth echotexture  No right-sided intratesticular mass lesion or calcifications    LEFT testis = 15 1 x 9 7 x 14 0 cm  The left testicle is markedly enlarged with profoundly heterogeneous echotexture      1/17/20 Urology, Dr Kailyn Morales for left radical orchiectomy and staging imaging  1/20/20 Left radical orchiectomy     2/3/20 Urology follow-up, Dr Aure La  Reviewed pathology, left testicular seminoma, 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  Scrotal skin was negative for tumor involvement, no tumor was seen at the spermatic cord or the spermatic cord margin, and no penetration through the tunica vaginalis was seen  Plan for CT abd/pelvis and chest xray, recheck lab work  Follow-up to review and referral at that time  2/5/20 Bloodwork:  AFP tumor marker: 5 0  HCG tumor marker: < 1  HCG, Quantitative: < 0 6      2/7/20 CT Abd/Pelvis  IMPRESSION:   1   No evidence of metastatic disease in the abdomen or pelvis    2   Large collection in the left hemiscrotum with central hyperdensity likely representing hematoma  Clinical correlation recommended    3   Postoperative stranding in the left inguinal region with prominent lymph nodes which are likely reactive    4   Cholelithiasis  2/7/20 XR chest pa & lateral  IMPRESSION:   No acute cardiopulmonary disease  2/17/20 Urology f/u with Dr Aure La  CT negative for metastases, HCG and AFP markers are negative     Refer to Radiation Oncology for consideration of hockey-stick adjuvant radiation therapy  Scrotal hematoma mostly resolved, no signs of infection, follow-up in 3 months  2/18/20 Multidisciplinary Urology Case Review  Physician Recommended Plan:  Referral to Radiation Oncology and Medical Oncology        2/21/20 Dr Vicki Palmer  Favor observation  Prophylactic radiation therapy or chemotherapy are reasonable to consider  Reviewed that likelihood of cure is high, observation is reasonable with a goal of avoiding unnecessary treatment  If recurrent, salvage is generally quite effective  Pt and mother prefer avoiding therapy unless necessary  Plan for follow-up in May with repeat imaging and bloodwork to initiate surveillance  5/8/20 CT abd/pelvis and CXR  5/12/20 Dr Dino Dye Urology follow-up  5/15/20 Melanie Carlos follow-up           Testicular seminoma, 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 [address] )     1/20/2020 Surgery    Left radical orchiectomy inguinal, patrial scrotectomy (Left)-combination of inguinal and scrotal approach due to size     A  Left testis and spermatic cord:  - Seminoma, 15 5 cm in greatest dimension, with extension beyond tunica albuginea as evidenced by tumor in epididymis and in hilar fat  No penetration through tunica vaginalis is appreciated  - No tumor seen within spermatic cord or at spermatic cord margin  - See synoptic report below     B   Scrotal skin:  - No tumor seen         1/18/2021 -  Chemotherapy    pegfilgrastim (NEULASTA ONPRO) subcutaneous injection kit 6 mg, 6 mg, Subcutaneous, Once, 0 of 4 cycles  CISplatin (PLATINOL) 48 8 mg in sodium chloride 0 9 % 500 mL infusion, 20 mg/m2, Intravenous, Once, 0 of 4 cycles  fosaprepitant (EMEND) 150 mg in sodium chloride 0 9 % 250 mL IVPB, 150 mg, Intravenous, Once, 0 of 4 cycles  etoposide (TOPOSAR) 244 mg in sodium chloride 0 9 % 1,000 mL chemo infusion, 100 mg/m2, Intravenous, Once, 0 of 4 cycles Cancer Staging  No matching staging information was found for the patient  ECO - Asymptomatic    Interval history:  Clinically stable     Review of Systems   Constitutional: Negative for activity change, appetite change, fatigue, fever and unexpected weight change  Respiratory: Negative for cough and shortness of breath  Cardiovascular: Negative for chest pain and leg swelling  Gastrointestinal: Negative for abdominal pain, constipation, diarrhea and nausea  Endocrine: Negative for cold intolerance and heat intolerance  Musculoskeletal: Negative for arthralgias and myalgias  Skin: Negative  Neurological: Negative for dizziness, weakness and headaches  Hematological: Negative for adenopathy  Does not bruise/bleed easily  Current Outpatient Medications:     meloxicam (MOBIC) 15 mg tablet, Take 1 tablet (15 mg total) by mouth daily, Disp: 90 tablet, Rfl: 3    No Known Allergies    Advance Directive and Living Will:        Objective:   /73 (BP Location: Left arm, Patient Position: Sitting, Cuff Size: Extra-Large)   Pulse 69   Temp 98 6 °F (37 °C) (Tympanic)   Ht 6' 2" (1 88 m)   Wt 119 kg (261 lb 12 8 oz)   BMI 33 61 kg/m²   Wt Readings from Last 6 Encounters:   21 119 kg (261 lb 12 8 oz)   20 114 kg (252 lb)   20 114 kg (252 lb 6 4 oz)   20 104 kg (230 lb)   20 104 kg (228 lb 8 oz)   20 105 kg (231 lb)       Physical Exam  Vitals signs reviewed  Constitutional:       Appearance: Normal appearance  He is well-developed  HENT:      Head: Normocephalic and atraumatic  Eyes:      Pupils: Pupils are equal, round, and reactive to light  Neck:      Musculoskeletal: Normal range of motion  Cardiovascular:      Rate and Rhythm: Normal rate and regular rhythm  Heart sounds: Normal heart sounds  Pulmonary:      Effort: Pulmonary effort is normal  No respiratory distress  Breath sounds: Normal breath sounds     Abdominal: General: Bowel sounds are normal       Palpations: Abdomen is soft  Musculoskeletal: Normal range of motion  Lymphadenopathy:      Cervical: No cervical adenopathy  Skin:     General: Skin is warm and dry  Capillary Refill: Capillary refill takes less than 2 seconds  Neurological:      Mental Status: He is alert and oriented to person, place, and time  Mental status is at baseline  Psychiatric:         Behavior: Behavior normal          Pertinent Laboratory Results and Imaging Review:  Hospital Outpatient Visit on 12/22/2020   Component Date Value Ref Range Status    WBC 12/22/2020 11 32* 4 31 - 10 16 Thousand/uL Final    RBC 12/22/2020 4 80  3 88 - 5 62 Million/uL Final    Hemoglobin 12/22/2020 14 6  12 0 - 17 0 g/dL Final    Hematocrit 12/22/2020 43 3  36 5 - 49 3 % Final    MCV 12/22/2020 90  82 - 98 fL Final    MCH 12/22/2020 30 4  26 8 - 34 3 pg Final    MCHC 12/22/2020 33 7  31 4 - 37 4 g/dL Final    RDW 12/22/2020 13 2  11 6 - 15 1 % Final    Platelets 84/11/1633 359  149 - 390 Thousands/uL Final    MPV 12/22/2020 9 8  8 9 - 12 7 fL Final    Protime 12/22/2020 12 4  11 6 - 14 5 seconds Final    INR 12/22/2020 0 94  0 84 - 1 19 Final    Case Report 12/22/2020    Final                    Value:Surgical Pathology Report                         Case: [id number]                                   Authorizing Provider:  Miles Johnson DO            Collected:           12/22/2020 1219              Ordering Location:     [facility] Received:            12/22/2020 1223                                     CAT Scan                                                                     Pathologist:           Adrienne Chacon MD                                                                  Specimen:    Lymph Node, retroperitoneal lymph node                                                     Final Diagnosis 12/22/2020    Final                    Value: This result contains rich text formatting which cannot be displayed here   Microscopic Description 12/22/2020    Final                    Value: This result contains rich text formatting which cannot be displayed here   Note 12/22/2020    Final                    Value: This result contains rich text formatting which cannot be displayed here   Additional Information 12/22/2020    Final                    Value: This result contains rich text formatting which cannot be displayed here  Jack Pap Description 12/22/2020    Final                    Value: This result contains rich text formatting which cannot be displayed here   Clinical Information 12/22/2020    Final                    Value:hx seminoma, enlarging retroperitoneal lymph node    Miscellaneous Lab Test Result 12/22/2020 Lymphoproliferative disorder analysis   Final    REFERRAL TEST NAME 12/22/2020 lymphoproliferative disorder analysis   Final    REFERRAL LAB 12/22/2020 [facility]   Final    REFERRAL TEST CODE 12/22/2020 5535   Final         The following historical data was reviewed      Past Medical History:   Diagnosis Date    Cleft hard palate     Mass of left testicle     Seizures ([address] )     Testicular mass 1/16/2020    Added automatically from request for surgery [id number]       Past Surgical History:   Procedure Laterality Date    BRAIN SURGERY      CLEFT LIP REPAIR      IR BIOPSY RETROPERITONEUM  12/22/2020    ORCHIECTOMY Left     RI REMOVAL TESTIS,RADICAL Left 1/20/2020    Procedure: LEFT RADICAL ORCHIECTOMY INGUINAL, patrial scrotectomy;  Surgeon: Paulina Abarca MD;  Location: [facility];  Service: Urology       Social History     Socioeconomic History    Marital status: Single     Spouse name: None    Number of children: None    Years of education: None    Highest education level: None   Occupational History    Occupation: disabled   Social Needs    Financial resource strain: None    Food insecurity     Worry: None     Inability: None    Transportation needs     Medical: None     Non-medical: None   Tobacco Use    Smoking status: Current Every Day Smoker     Packs/day: 0 50     Types: Cigarettes    Smokeless tobacco: Former User   Substance and Sexual Activity    Alcohol use: Yes     Comment: rare    Drug use: Never    Sexual activity: Not Currently   Lifestyle    Physical activity     Days per week: None     Minutes per session: None    Stress: None   Relationships    Social connections     Talks on phone: None     Gets together: None     Attends Congregation service: None     Active member of club or organization: None     Attends meetings of clubs or organizations: None     Relationship status: None    Intimate partner violence     Fear of current or ex partner: None     Emotionally abused: None     Physically abused: None     Forced sexual activity: None   Other Topics Concern    None   Social History Narrative    Disabled    Single    No children    Lives with his mother  Family History   Problem Relation Age of Onset    Hypertension Mother     COPD Mother     Seizures Father     Heart disease Father     Cancer Family        Please note: This report has been generated by a voice recognition software system  Therefore there may be syntax, spelling, and/or grammatical errors  Please call if you have any questions

## 2021-01-11 DIAGNOSIS — C62.92 TESTICULAR SEMINOMA, LEFT (HCC): Primary | ICD-10-CM

## 2021-01-11 PROBLEM — Z76.89 PREVENTION OF CHEMOTHERAPY-INDUCED NEUTROPENIA: Status: ACTIVE | Noted: 2021-01-11

## 2021-01-11 RX ORDER — SODIUM CHLORIDE 9 MG/ML
20 INJECTION, SOLUTION INTRAVENOUS ONCE
Status: CANCELLED | OUTPATIENT
Start: 2021-01-20

## 2021-01-11 RX ORDER — SODIUM CHLORIDE 9 MG/ML
20 INJECTION, SOLUTION INTRAVENOUS ONCE
Status: CANCELLED | OUTPATIENT
Start: 2021-01-29

## 2021-01-11 RX ORDER — SODIUM CHLORIDE 9 MG/ML
20 INJECTION, SOLUTION INTRAVENOUS ONCE
Status: CANCELLED | OUTPATIENT
Start: 2021-01-18

## 2021-01-11 RX ORDER — SODIUM CHLORIDE 9 MG/ML
20 INJECTION, SOLUTION INTRAVENOUS ONCE
Status: CANCELLED | OUTPATIENT
Start: 2021-01-19

## 2021-01-11 RX ORDER — SODIUM CHLORIDE 9 MG/ML
20 INJECTION, SOLUTION INTRAVENOUS ONCE
Status: CANCELLED | OUTPATIENT
Start: 2021-01-28

## 2021-01-15 ENCOUNTER — APPOINTMENT (OUTPATIENT)
Dept: LAB | Facility: HOSPITAL | Age: 46
End: 2021-01-15
Attending: INTERNAL MEDICINE
Payer: MEDICARE

## 2021-01-15 DIAGNOSIS — C62.92 TESTICULAR SEMINOMA, LEFT (HCC): ICD-10-CM

## 2021-01-15 LAB
ALBUMIN SERPL BCP-MCNC: 3.8 G/DL (ref 3.5–5)
ALP SERPL-CCNC: 90 U/L (ref 46–116)
ALT SERPL W P-5'-P-CCNC: 52 U/L (ref 12–78)
ANION GAP SERPL CALCULATED.3IONS-SCNC: 11 MMOL/L (ref 4–13)
AST SERPL W P-5'-P-CCNC: 22 U/L (ref 5–45)
BASOPHILS # BLD AUTO: 0.05 THOUSANDS/ΜL (ref 0–0.1)
BASOPHILS NFR BLD AUTO: 1 % (ref 0–1)
BILIRUB SERPL-MCNC: 1 MG/DL (ref 0.2–1)
BUN SERPL-MCNC: 17 MG/DL (ref 5–25)
CALCIUM SERPL-MCNC: 9.6 MG/DL (ref 8.3–10.1)
CHLORIDE SERPL-SCNC: 102 MMOL/L (ref 100–108)
CO2 SERPL-SCNC: 24 MMOL/L (ref 21–32)
CREAT SERPL-MCNC: 0.98 MG/DL (ref 0.6–1.3)
EOSINOPHIL # BLD AUTO: 0.13 THOUSAND/ΜL (ref 0–0.61)
EOSINOPHIL NFR BLD AUTO: 1 % (ref 0–6)
ERYTHROCYTE [DISTWIDTH] IN BLOOD BY AUTOMATED COUNT: 13.5 % (ref 11.6–15.1)
GFR SERPL CREATININE-BSD FRML MDRD: 93 ML/MIN/1.73SQ M
GLUCOSE SERPL-MCNC: 95 MG/DL (ref 65–140)
HCT VFR BLD AUTO: 45.8 % (ref 36.5–49.3)
HGB BLD-MCNC: 15.3 G/DL (ref 12–17)
IMM GRANULOCYTES # BLD AUTO: 0.03 THOUSAND/UL (ref 0–0.2)
IMM GRANULOCYTES NFR BLD AUTO: 0 % (ref 0–2)
LYMPHOCYTES # BLD AUTO: 2.92 THOUSANDS/ΜL (ref 0.6–4.47)
LYMPHOCYTES NFR BLD AUTO: 31 % (ref 14–44)
MCH RBC QN AUTO: 30.2 PG (ref 26.8–34.3)
MCHC RBC AUTO-ENTMCNC: 33.4 G/DL (ref 31.4–37.4)
MCV RBC AUTO: 90 FL (ref 82–98)
MONOCYTES # BLD AUTO: 0.53 THOUSAND/ΜL (ref 0.17–1.22)
MONOCYTES NFR BLD AUTO: 6 % (ref 4–12)
NEUTROPHILS # BLD AUTO: 5.64 THOUSANDS/ΜL (ref 1.85–7.62)
NEUTS SEG NFR BLD AUTO: 61 % (ref 43–75)
NRBC BLD AUTO-RTO: 0 /100 WBCS
PLATELET # BLD AUTO: 356 THOUSANDS/UL (ref 149–390)
PMV BLD AUTO: 9.7 FL (ref 8.9–12.7)
POTASSIUM SERPL-SCNC: 3.8 MMOL/L (ref 3.5–5.3)
PROT SERPL-MCNC: 7.6 G/DL (ref 6.4–8.2)
RBC # BLD AUTO: 5.07 MILLION/UL (ref 3.88–5.62)
SODIUM SERPL-SCNC: 137 MMOL/L (ref 136–145)
WBC # BLD AUTO: 9.3 THOUSAND/UL (ref 4.31–10.16)

## 2021-01-15 PROCEDURE — 36415 COLL VENOUS BLD VENIPUNCTURE: CPT

## 2021-01-15 PROCEDURE — 85025 COMPLETE CBC W/AUTO DIFF WBC: CPT

## 2021-01-15 PROCEDURE — 80053 COMPREHEN METABOLIC PANEL: CPT

## 2021-01-18 ENCOUNTER — HOSPITAL ENCOUNTER (OUTPATIENT)
Dept: INFUSION CENTER | Facility: HOSPITAL | Age: 46
Discharge: HOME | End: 2021-01-18
Attending: INTERNAL MEDICINE
Payer: MEDICARE

## 2021-01-18 VITALS
SYSTOLIC BLOOD PRESSURE: 126 MMHG | HEART RATE: 79 BPM | RESPIRATION RATE: 16 BRPM | DIASTOLIC BLOOD PRESSURE: 83 MMHG | HEIGHT: 74 IN | WEIGHT: 256.17 LBS | TEMPERATURE: 98.5 F | BODY MASS INDEX: 32.88 KG/M2

## 2021-01-18 DIAGNOSIS — Z76.89 PREVENTION OF CHEMOTHERAPY-INDUCED NEUTROPENIA: ICD-10-CM

## 2021-01-18 DIAGNOSIS — C62.92 TESTICULAR SEMINOMA, LEFT (HCC): Primary | ICD-10-CM

## 2021-01-18 PROCEDURE — 96417 CHEMO IV INFUS EACH ADDL SEQ: CPT

## 2021-01-18 PROCEDURE — 96367 TX/PROPH/DG ADDL SEQ IV INF: CPT

## 2021-01-18 PROCEDURE — 96361 HYDRATE IV INFUSION ADD-ON: CPT

## 2021-01-18 PROCEDURE — 96413 CHEMO IV INFUSION 1 HR: CPT

## 2021-01-18 RX ORDER — SODIUM CHLORIDE 9 MG/ML
20 INJECTION, SOLUTION INTRAVENOUS ONCE
Status: COMPLETED | OUTPATIENT
Start: 2021-01-18 | End: 2021-01-18

## 2021-01-18 RX ORDER — ONDANSETRON HYDROCHLORIDE 8 MG/1
8 TABLET, FILM COATED ORAL EVERY 8 HOURS PRN
Qty: 20 TABLET | Refills: 2 | Status: SHIPPED | OUTPATIENT
Start: 2021-01-18

## 2021-01-18 RX ADMIN — DEXAMETHASONE SODIUM PHOSPHATE: 10 INJECTION, SOLUTION INTRAMUSCULAR; INTRAVENOUS at 10:04

## 2021-01-18 RX ADMIN — SODIUM CHLORIDE 20 ML/HR: 0.9 INJECTION, SOLUTION INTRAVENOUS at 08:50

## 2021-01-18 RX ADMIN — SODIUM CHLORIDE 500 ML: 0.9 INJECTION, SOLUTION INTRAVENOUS at 13:58

## 2021-01-18 RX ADMIN — ETOPOSIDE 244 MG: 20 INJECTION INTRAVENOUS at 12:33

## 2021-01-18 RX ADMIN — FOSAPREPITANT 150 MG: 150 INJECTION, POWDER, LYOPHILIZED, FOR SOLUTION INTRAVENOUS at 10:35

## 2021-01-18 RX ADMIN — SODIUM CHLORIDE 500 ML: 0.9 INJECTION, SOLUTION INTRAVENOUS at 08:50

## 2021-01-18 RX ADMIN — CISPLATIN 50 MG: 1 INJECTION INTRAVENOUS at 11:26

## 2021-01-18 NOTE — PROGRESS NOTES
Patient to the unit for first time cisplatin and etoposide  Tolerated both infusions without adverse reaction  Spoke with Marcos Bryan who states placed a Rx for Zofran for patient  AVS given, spoke with patient's brother regarding Rx for nausea and also made him aware that patient will have to cancel his appointment with Dr Diallo Valle as the patient has chemotherapy tomorrow morning  Patient's brother voices understanding

## 2021-01-18 NOTE — PLAN OF CARE
Problem: Potential for Falls  Goal: Patient will remain free of falls  Description: INTERVENTIONS:  - Assess patient frequently for physical needs  -  Identify cognitive and physical deficits and behaviors that affect risk of falls  -  [place] fall precautions as indicated by assessment   - Educate patient/family on patient safety including physical limitations  - Instruct patient to call for assistance with activity based on assessment  - Modify environment to reduce risk of injury  - Consider OT/PT consult to assist with strengthening/mobility  Outcome: Progressing     Problem: PAIN - ADULT  Goal: Verbalizes/displays adequate comfort level or baseline comfort level  Description: Interventions:  - Encourage patient to monitor pain and request assistance  - Assess pain using appropriate pain scale  - Administer analgesics based on type and severity of pain and evaluate response  - Implement non-pharmacological measures as appropriate and evaluate response  - Consider cultural and social influences on pain and pain management  - Notify physician/advanced practitioner if interventions unsuccessful or patient reports new pain  Outcome: Progressing     Problem: SAFETY ADULT  Goal: Patient will remain free of falls  Description: INTERVENTIONS:  - Assess patient frequently for physical needs  -  Identify cognitive and physical deficits and behaviors that affect risk of falls    -  [place] fall precautions as indicated by assessment   - Educate patient/family on patient safety including physical limitations  - Instruct patient to call for assistance with activity based on assessment  - Modify environment to reduce risk of injury  - Consider OT/PT consult to assist with strengthening/mobility  Outcome: Progressing     Problem: DISCHARGE PLANNING  Goal: Discharge to home or other facility with appropriate resources  Description: INTERVENTIONS:  - Identify barriers to discharge w/patient and caregiver  - Arrange for needed discharge resources and transportation as appropriate  - Identify discharge learning needs (meds, wound care, etc )  - Arrange for interpretive services to assist at discharge as needed  - Refer to Case Management Department for coordinating discharge planning if the patient needs post-hospital services based on physician/advanced practitioner order or complex needs related to functional status, cognitive ability, or social support system  Outcome: Progressing

## 2021-01-19 ENCOUNTER — HOSPITAL ENCOUNTER (OUTPATIENT)
Dept: INFUSION CENTER | Facility: HOSPITAL | Age: 46
Discharge: HOME | End: 2021-01-19
Attending: INTERNAL MEDICINE
Payer: MEDICARE

## 2021-01-19 VITALS
SYSTOLIC BLOOD PRESSURE: 128 MMHG | DIASTOLIC BLOOD PRESSURE: 79 MMHG | WEIGHT: 256.17 LBS | BODY MASS INDEX: 32.88 KG/M2 | HEIGHT: 74 IN | TEMPERATURE: 98 F | HEART RATE: 73 BPM

## 2021-01-19 DIAGNOSIS — Z76.89 PREVENTION OF CHEMOTHERAPY-INDUCED NEUTROPENIA: ICD-10-CM

## 2021-01-19 DIAGNOSIS — C62.92 TESTICULAR SEMINOMA, LEFT (HCC): Primary | ICD-10-CM

## 2021-01-19 PROCEDURE — 96361 HYDRATE IV INFUSION ADD-ON: CPT

## 2021-01-19 PROCEDURE — 96417 CHEMO IV INFUS EACH ADDL SEQ: CPT

## 2021-01-19 PROCEDURE — 96413 CHEMO IV INFUSION 1 HR: CPT

## 2021-01-19 PROCEDURE — 96367 TX/PROPH/DG ADDL SEQ IV INF: CPT

## 2021-01-19 RX ORDER — SODIUM CHLORIDE 9 MG/ML
20 INJECTION, SOLUTION INTRAVENOUS ONCE
Status: COMPLETED | OUTPATIENT
Start: 2021-01-19 | End: 2021-01-19

## 2021-01-19 RX ADMIN — CISPLATIN 50 MG: 1 INJECTION INTRAVENOUS at 10:41

## 2021-01-19 RX ADMIN — SODIUM CHLORIDE 500 ML: 0.9 INJECTION, SOLUTION INTRAVENOUS at 13:05

## 2021-01-19 RX ADMIN — SODIUM CHLORIDE 20 ML/HR: 0.9 INJECTION, SOLUTION INTRAVENOUS at 09:20

## 2021-01-19 RX ADMIN — SODIUM CHLORIDE 500 ML: 0.9 INJECTION, SOLUTION INTRAVENOUS at 09:26

## 2021-01-19 RX ADMIN — ETOPOSIDE 244 MG: 20 INJECTION INTRAVENOUS at 11:49

## 2021-01-19 RX ADMIN — DEXAMETHASONE SODIUM PHOSPHATE: 10 INJECTION, SOLUTION INTRAMUSCULAR; INTRAVENOUS at 10:20

## 2021-01-19 NOTE — PROGRESS NOTES
Patient to the unit for day 2 of Cisplatin, Etoposide and hydration  Tolerated without adverse reaction  AVS given, patient aware to return tomorrow for day 3 treatment tomorrow

## 2021-01-19 NOTE — PLAN OF CARE
Problem: PAIN - ADULT  Goal: Verbalizes/displays adequate comfort level or baseline comfort level  Description: Interventions:  - Encourage patient to monitor pain and request assistance  - Assess pain using appropriate pain scale  - Administer analgesics based on type and severity of pain and evaluate response  - Implement non-pharmacological measures as appropriate and evaluate response  - Consider cultural and social influences on pain and pain management  - Notify physician/advanced practitioner if interventions unsuccessful or patient reports new pain  Outcome: Progressing     Problem: SAFETY ADULT  Goal: Patient will remain free of falls  Description: INTERVENTIONS:  - Assess patient frequently for physical needs  -  Identify cognitive and physical deficits and behaviors that affect risk of falls    -  [place] fall precautions as indicated by assessment   - Educate patient/family on patient safety including physical limitations  - Instruct patient to call for assistance with activity based on assessment  - Modify environment to reduce risk of injury  - Consider OT/PT consult to assist with strengthening/mobility  Outcome: Progressing     Problem: DISCHARGE PLANNING  Goal: Discharge to home or other facility with appropriate resources  Description: INTERVENTIONS:  - Identify barriers to discharge w/patient and caregiver  - Arrange for needed discharge resources and transportation as appropriate  - Identify discharge learning needs (meds, wound care, etc )  - Arrange for interpretive services to assist at discharge as needed  - Refer to Case Management Department for coordinating discharge planning if the patient needs post-hospital services based on physician/advanced practitioner order or complex needs related to functional status, cognitive ability, or social support system  Outcome: Progressing

## 2021-01-20 ENCOUNTER — HOSPITAL ENCOUNTER (OUTPATIENT)
Dept: INFUSION CENTER | Facility: HOSPITAL | Age: 46
Discharge: HOME | End: 2021-01-20
Attending: INTERNAL MEDICINE
Payer: MEDICARE

## 2021-01-20 VITALS
HEIGHT: 74 IN | TEMPERATURE: 98.5 F | DIASTOLIC BLOOD PRESSURE: 85 MMHG | WEIGHT: 256.17 LBS | SYSTOLIC BLOOD PRESSURE: 135 MMHG | BODY MASS INDEX: 32.88 KG/M2 | RESPIRATION RATE: 16 BRPM | HEART RATE: 73 BPM

## 2021-01-20 DIAGNOSIS — C62.92 TESTICULAR SEMINOMA, LEFT (HCC): Primary | ICD-10-CM

## 2021-01-20 DIAGNOSIS — Z76.89 PREVENTION OF CHEMOTHERAPY-INDUCED NEUTROPENIA: ICD-10-CM

## 2021-01-20 PROCEDURE — 96361 HYDRATE IV INFUSION ADD-ON: CPT

## 2021-01-20 PROCEDURE — 96367 TX/PROPH/DG ADDL SEQ IV INF: CPT

## 2021-01-20 PROCEDURE — 96413 CHEMO IV INFUSION 1 HR: CPT

## 2021-01-20 PROCEDURE — 96417 CHEMO IV INFUS EACH ADDL SEQ: CPT

## 2021-01-20 RX ORDER — SODIUM CHLORIDE 9 MG/ML
20 INJECTION, SOLUTION INTRAVENOUS ONCE
Status: COMPLETED | OUTPATIENT
Start: 2021-01-20 | End: 2021-01-20

## 2021-01-20 RX ADMIN — SODIUM CHLORIDE 500 ML: 0.9 INJECTION, SOLUTION INTRAVENOUS at 13:09

## 2021-01-20 RX ADMIN — CISPLATIN 50 MG: 1 INJECTION INTRAVENOUS at 10:47

## 2021-01-20 RX ADMIN — DEXAMETHASONE SODIUM PHOSPHATE: 10 INJECTION, SOLUTION INTRAMUSCULAR; INTRAVENOUS at 10:19

## 2021-01-20 RX ADMIN — SODIUM CHLORIDE: 0.9 INJECTION, SOLUTION INTRAVENOUS at 09:20

## 2021-01-20 RX ADMIN — SODIUM CHLORIDE 20 ML/HR: 9 INJECTION, SOLUTION INTRAVENOUS at 09:19

## 2021-01-20 RX ADMIN — ETOPOSIDE 244 MG: 20 INJECTION INTRAVENOUS at 11:54

## 2021-01-20 NOTE — PROGRESS NOTES
Patient to the unit for Cisplatin and etoposide infusion as well as hydration  Tolerated treatment without adverse reaction  AVS given, aware of tomorrow's appointment  Accompanied pt to the lobby to meet his brother who was waiting  Voices no questions or concerns at discharge

## 2021-01-20 NOTE — PLAN OF CARE
Problem: Potential for Falls  Goal: Patient will remain free of falls  Description: INTERVENTIONS:  - Assess patient frequently for physical needs  -  Identify cognitive and physical deficits and behaviors that affect risk of falls  -  [place] fall precautions as indicated by assessment   - Educate patient/family on patient safety including physical limitations  - Instruct patient to call for assistance with activity based on assessment  - Modify environment to reduce risk of injury  - Consider OT/PT consult to assist with strengthening/mobility  Outcome: Progressing     Problem: PAIN - ADULT  Goal: Verbalizes/displays adequate comfort level or baseline comfort level  Description: Interventions:  - Encourage patient to monitor pain and request assistance  - Assess pain using appropriate pain scale  - Administer analgesics based on type and severity of pain and evaluate response  - Implement non-pharmacological measures as appropriate and evaluate response  - Consider cultural and social influences on pain and pain management  - Notify physician/advanced practitioner if interventions unsuccessful or patient reports new pain  Outcome: Progressing     Problem: SAFETY ADULT  Goal: Patient will remain free of falls  Description: INTERVENTIONS:  - Assess patient frequently for physical needs  -  Identify cognitive and physical deficits and behaviors that affect risk of falls    -  [place] fall precautions as indicated by assessment   - Educate patient/family on patient safety including physical limitations  - Instruct patient to call for assistance with activity based on assessment  - Modify environment to reduce risk of injury  - Consider OT/PT consult to assist with strengthening/mobility  Outcome: Progressing     Problem: DISCHARGE PLANNING  Goal: Discharge to home or other facility with appropriate resources  Description: INTERVENTIONS:  - Identify barriers to discharge w/patient and caregiver  - Arrange for needed discharge resources and transportation as appropriate  - Identify discharge learning needs (meds, wound care, etc )  - Arrange for interpretive services to assist at discharge as needed  - Refer to Case Management Department for coordinating discharge planning if the patient needs post-hospital services based on physician/advanced practitioner order or complex needs related to functional status, cognitive ability, or social support system  Outcome: Progressing

## 2021-01-21 ENCOUNTER — HOSPITAL ENCOUNTER (OUTPATIENT)
Dept: INFUSION CENTER | Facility: HOSPITAL | Age: 46
Discharge: HOME | End: 2021-01-21
Attending: INTERNAL MEDICINE

## 2021-01-21 ENCOUNTER — DOCUMENTATION (OUTPATIENT)
Dept: INFUSION CENTER | Facility: HOSPITAL | Age: 46
End: 2021-01-21

## 2021-01-21 NOTE — PROGRESS NOTES
[facility] not to [facility] Dr Perla Osuna nurse informed her that patient had a seizure today told his mom he did not feel good and his mother called the infusion center and cancelled his appointment  Patient is not currently prescribed any anti-seizure medications  I spoke with the mother she states patient has had him checked for his seizures and she states the doctors told her that they were self-induced happens when he is upset  Mother states Jeremi Felipe has been having diarrhea and is taking Imodium but was not taking his antiemetic  After speaking with his mother patient finally took a Nicky Conteh RN states she will be reaching out to Dr Mckenna Brink and she will also speak with the mother  She will be calling me back to let me know if patient will be coming in Monday for his missed treatment

## 2021-01-22 ENCOUNTER — HOSPITAL ENCOUNTER (OUTPATIENT)
Dept: INFUSION CENTER | Facility: HOSPITAL | Age: 46
End: 2021-01-22
Attending: INTERNAL MEDICINE

## 2021-01-26 DIAGNOSIS — C62.92 TESTICULAR SEMINOMA, LEFT (HCC): Primary | ICD-10-CM

## 2021-01-27 ENCOUNTER — LAB (OUTPATIENT)
Dept: LAB | Facility: HOSPITAL | Age: 46
End: 2021-01-27
Attending: INTERNAL MEDICINE
Payer: MEDICARE

## 2021-01-27 DIAGNOSIS — C62.92 TESTICULAR SEMINOMA, LEFT (HCC): Primary | ICD-10-CM

## 2021-01-27 DIAGNOSIS — C62.92 TESTICULAR SEMINOMA, LEFT (HCC): ICD-10-CM

## 2021-01-27 PROBLEM — T45.1X5A CHEMOTHERAPY INDUCED NAUSEA AND VOMITING: Status: ACTIVE | Noted: 2021-01-27

## 2021-01-27 PROBLEM — R11.2 CHEMOTHERAPY INDUCED NAUSEA AND VOMITING: Status: ACTIVE | Noted: 2021-01-27

## 2021-01-27 LAB
ALBUMIN SERPL BCP-MCNC: 3.8 G/DL (ref 3.5–5)
ALP SERPL-CCNC: 83 U/L (ref 46–116)
ALT SERPL W P-5'-P-CCNC: 42 U/L (ref 12–78)
ANION GAP SERPL CALCULATED.3IONS-SCNC: 10 MMOL/L (ref 4–13)
AST SERPL W P-5'-P-CCNC: 17 U/L (ref 5–45)
BASOPHILS # BLD AUTO: 0.04 THOUSANDS/ΜL (ref 0–0.1)
BASOPHILS NFR BLD AUTO: 1 % (ref 0–1)
BILIRUB SERPL-MCNC: 0.5 MG/DL (ref 0.2–1)
BUN SERPL-MCNC: 19 MG/DL (ref 5–25)
CALCIUM SERPL-MCNC: 9.4 MG/DL (ref 8.3–10.1)
CHLORIDE SERPL-SCNC: 103 MMOL/L (ref 100–108)
CO2 SERPL-SCNC: 26 MMOL/L (ref 21–32)
CREAT SERPL-MCNC: 1.02 MG/DL (ref 0.6–1.3)
EOSINOPHIL # BLD AUTO: 0.05 THOUSAND/ΜL (ref 0–0.61)
EOSINOPHIL NFR BLD AUTO: 1 % (ref 0–6)
ERYTHROCYTE [DISTWIDTH] IN BLOOD BY AUTOMATED COUNT: 13.4 % (ref 11.6–15.1)
GFR SERPL CREATININE-BSD FRML MDRD: 89 ML/MIN/1.73SQ M
GLUCOSE P FAST SERPL-MCNC: 115 MG/DL (ref 65–99)
HCT VFR BLD AUTO: 43.1 % (ref 36.5–49.3)
HGB BLD-MCNC: 14.3 G/DL (ref 12–17)
IMM GRANULOCYTES # BLD AUTO: 0.04 THOUSAND/UL (ref 0–0.2)
IMM GRANULOCYTES NFR BLD AUTO: 1 % (ref 0–2)
LYMPHOCYTES # BLD AUTO: 2.13 THOUSANDS/ΜL (ref 0.6–4.47)
LYMPHOCYTES NFR BLD AUTO: 31 % (ref 14–44)
MCH RBC QN AUTO: 29.9 PG (ref 26.8–34.3)
MCHC RBC AUTO-ENTMCNC: 33.2 G/DL (ref 31.4–37.4)
MCV RBC AUTO: 90 FL (ref 82–98)
MONOCYTES # BLD AUTO: 0.38 THOUSAND/ΜL (ref 0.17–1.22)
MONOCYTES NFR BLD AUTO: 5 % (ref 4–12)
NEUTROPHILS # BLD AUTO: 4.35 THOUSANDS/ΜL (ref 1.85–7.62)
NEUTS SEG NFR BLD AUTO: 61 % (ref 43–75)
NRBC BLD AUTO-RTO: 0 /100 WBCS
PLATELET # BLD AUTO: 344 THOUSANDS/UL (ref 149–390)
PMV BLD AUTO: 9.6 FL (ref 8.9–12.7)
POTASSIUM SERPL-SCNC: 4.1 MMOL/L (ref 3.5–5.3)
PROT SERPL-MCNC: 7.3 G/DL (ref 6.4–8.2)
RBC # BLD AUTO: 4.78 MILLION/UL (ref 3.88–5.62)
SODIUM SERPL-SCNC: 139 MMOL/L (ref 136–145)
WBC # BLD AUTO: 6.99 THOUSAND/UL (ref 4.31–10.16)

## 2021-01-27 PROCEDURE — 80053 COMPREHEN METABOLIC PANEL: CPT

## 2021-01-27 PROCEDURE — 36415 COLL VENOUS BLD VENIPUNCTURE: CPT

## 2021-01-27 PROCEDURE — 85025 COMPLETE CBC W/AUTO DIFF WBC: CPT

## 2021-01-28 ENCOUNTER — HOSPITAL ENCOUNTER (OUTPATIENT)
Dept: INFUSION CENTER | Facility: HOSPITAL | Age: 46
Discharge: HOME | End: 2021-01-28
Attending: INTERNAL MEDICINE
Payer: MEDICARE

## 2021-01-28 VITALS
DIASTOLIC BLOOD PRESSURE: 87 MMHG | RESPIRATION RATE: 16 BRPM | SYSTOLIC BLOOD PRESSURE: 116 MMHG | WEIGHT: 257.72 LBS | TEMPERATURE: 99.2 F | HEIGHT: 74 IN | HEART RATE: 88 BPM | BODY MASS INDEX: 33.07 KG/M2

## 2021-01-28 DIAGNOSIS — C62.92 TESTICULAR SEMINOMA, LEFT (HCC): ICD-10-CM

## 2021-01-28 DIAGNOSIS — R11.2 CHEMOTHERAPY INDUCED NAUSEA AND VOMITING: Primary | ICD-10-CM

## 2021-01-28 DIAGNOSIS — T45.1X5A CHEMOTHERAPY INDUCED NAUSEA AND VOMITING: Primary | ICD-10-CM

## 2021-01-28 DIAGNOSIS — Z76.89 PREVENTION OF CHEMOTHERAPY-INDUCED NEUTROPENIA: ICD-10-CM

## 2021-01-28 PROCEDURE — 96361 HYDRATE IV INFUSION ADD-ON: CPT

## 2021-01-28 PROCEDURE — 96367 TX/PROPH/DG ADDL SEQ IV INF: CPT

## 2021-01-28 PROCEDURE — 96413 CHEMO IV INFUSION 1 HR: CPT

## 2021-01-28 PROCEDURE — 96417 CHEMO IV INFUS EACH ADDL SEQ: CPT

## 2021-01-28 RX ORDER — SODIUM CHLORIDE 9 MG/ML
20 INJECTION, SOLUTION INTRAVENOUS ONCE
Status: COMPLETED | OUTPATIENT
Start: 2021-01-28 | End: 2021-01-28

## 2021-01-28 RX ADMIN — SODIUM CHLORIDE 500 ML: 0.9 INJECTION, SOLUTION INTRAVENOUS at 09:20

## 2021-01-28 RX ADMIN — CISPLATIN 50 MG: 1 INJECTION INTRAVENOUS at 12:49

## 2021-01-28 RX ADMIN — ETOPOSIDE 244 MG: 20 INJECTION INTRAVENOUS at 11:45

## 2021-01-28 RX ADMIN — SODIUM CHLORIDE 20 ML/HR: 0.9 INJECTION, SOLUTION INTRAVENOUS at 09:22

## 2021-01-28 RX ADMIN — FOSAPREPITANT 150 MG: 150 INJECTION, POWDER, LYOPHILIZED, FOR SOLUTION INTRAVENOUS at 10:24

## 2021-01-28 RX ADMIN — DEXAMETHASONE SODIUM PHOSPHATE: 10 INJECTION, SOLUTION INTRAMUSCULAR; INTRAVENOUS at 11:16

## 2021-01-28 RX ADMIN — SODIUM CHLORIDE 500 ML: 0.9 INJECTION, SOLUTION INTRAVENOUS at 13:53

## 2021-01-28 NOTE — PROGRESS NOTES
Chemo and hydration given without incident  Pt offers no complaints on D/C  I escorted pt to the lobby, reviewed appt with his brother for tomorrow at 8am  Both verbalize understanding  I also reinforced with his brother to take the PRN zofran at home if nausea develops  AVS given

## 2021-01-29 ENCOUNTER — HOSPITAL ENCOUNTER (OUTPATIENT)
Dept: INFUSION CENTER | Facility: HOSPITAL | Age: 46
Discharge: HOME | End: 2021-01-29
Attending: INTERNAL MEDICINE
Payer: MEDICARE

## 2021-01-29 VITALS
DIASTOLIC BLOOD PRESSURE: 77 MMHG | HEIGHT: 74 IN | WEIGHT: 257.72 LBS | BODY MASS INDEX: 33.07 KG/M2 | SYSTOLIC BLOOD PRESSURE: 171 MMHG | HEART RATE: 84 BPM | OXYGEN SATURATION: 98 % | RESPIRATION RATE: 18 BRPM | TEMPERATURE: 98.8 F

## 2021-01-29 DIAGNOSIS — R11.2 CHEMOTHERAPY INDUCED NAUSEA AND VOMITING: Primary | ICD-10-CM

## 2021-01-29 DIAGNOSIS — C62.92 TESTICULAR SEMINOMA, LEFT (HCC): ICD-10-CM

## 2021-01-29 DIAGNOSIS — T45.1X5A CHEMOTHERAPY INDUCED NAUSEA AND VOMITING: Primary | ICD-10-CM

## 2021-01-29 DIAGNOSIS — Z76.89 PREVENTION OF CHEMOTHERAPY-INDUCED NEUTROPENIA: ICD-10-CM

## 2021-01-29 PROCEDURE — 96413 CHEMO IV INFUSION 1 HR: CPT

## 2021-01-29 PROCEDURE — 96361 HYDRATE IV INFUSION ADD-ON: CPT

## 2021-01-29 PROCEDURE — 96367 TX/PROPH/DG ADDL SEQ IV INF: CPT

## 2021-01-29 PROCEDURE — 96417 CHEMO IV INFUS EACH ADDL SEQ: CPT

## 2021-01-29 PROCEDURE — 96377 APPLICATON ON-BODY INJECTOR: CPT

## 2021-01-29 RX ORDER — SODIUM CHLORIDE 9 MG/ML
20 INJECTION, SOLUTION INTRAVENOUS ONCE
Status: COMPLETED | OUTPATIENT
Start: 2021-01-29 | End: 2021-01-29

## 2021-01-29 RX ADMIN — PEGFILGRASTIM 6 MG: KIT SUBCUTANEOUS at 13:07

## 2021-01-29 RX ADMIN — CISPLATIN 50 MG: 1 INJECTION INTRAVENOUS at 09:34

## 2021-01-29 RX ADMIN — SODIUM CHLORIDE 500 ML: 0.9 INJECTION, SOLUTION INTRAVENOUS at 12:10

## 2021-01-29 RX ADMIN — SODIUM CHLORIDE 500 ML: 0.9 INJECTION, SOLUTION INTRAVENOUS at 08:30

## 2021-01-29 RX ADMIN — DEXAMETHASONE SODIUM PHOSPHATE: 10 INJECTION, SOLUTION INTRAMUSCULAR; INTRAVENOUS at 08:42

## 2021-01-29 RX ADMIN — SODIUM CHLORIDE 20 ML/HR: 0.9 INJECTION, SOLUTION INTRAVENOUS at 08:29

## 2021-01-29 RX ADMIN — ETOPOSIDE 244 MG: 20 INJECTION INTRAVENOUS at 10:48

## 2021-01-29 NOTE — PROGRESS NOTES
Pt tolerated treatment today without incident  Left unit with Onpro patent left arm  Pt and pts brother knowledgeable about monitoring and removing device  Aware of next appointment   AVS given

## 2021-01-29 NOTE — PLAN OF CARE
Problem: Potential for Falls  Goal: Patient will remain free of falls  Description: INTERVENTIONS:  - Assess patient frequently for physical needs  -  Identify cognitive and physical deficits and behaviors that affect risk of falls    -  [place] fall precautions as indicated by assessment   - Educate patient/family on patient safety including physical limitations  - Instruct patient to call for assistance with activity based on assessment  - Modify environment to reduce risk of injury  - Consider OT/PT consult to assist with strengthening/mobility  Outcome: Progressing

## 2021-02-05 ENCOUNTER — APPOINTMENT (OUTPATIENT)
Dept: LAB | Facility: HOSPITAL | Age: 46
End: 2021-02-05
Attending: INTERNAL MEDICINE
Payer: MEDICARE

## 2021-02-05 ENCOUNTER — OFFICE VISIT (OUTPATIENT)
Dept: HEMATOLOGY ONCOLOGY | Facility: CLINIC | Age: 46
End: 2021-02-05
Payer: MEDICARE

## 2021-02-05 VITALS
HEART RATE: 92 BPM | BODY MASS INDEX: 33.62 KG/M2 | OXYGEN SATURATION: 96 % | HEIGHT: 74 IN | TEMPERATURE: 98.8 F | DIASTOLIC BLOOD PRESSURE: 76 MMHG | SYSTOLIC BLOOD PRESSURE: 134 MMHG | WEIGHT: 262 LBS | RESPIRATION RATE: 18 BRPM

## 2021-02-05 DIAGNOSIS — C62.92 TESTICULAR SEMINOMA, LEFT (HCC): Primary | ICD-10-CM

## 2021-02-05 DIAGNOSIS — C62.92 TESTICULAR SEMINOMA, LEFT (HCC): ICD-10-CM

## 2021-02-05 LAB
ANISOCYTOSIS BLD QL SMEAR: PRESENT
BASOPHILS # BLD MANUAL: 0 THOUSAND/UL (ref 0–0.1)
BASOPHILS NFR MAR MANUAL: 0 % (ref 0–1)
EOSINOPHIL # BLD MANUAL: 0 THOUSAND/UL (ref 0–0.4)
EOSINOPHIL NFR BLD MANUAL: 0 % (ref 0–6)
ERYTHROCYTE [DISTWIDTH] IN BLOOD BY AUTOMATED COUNT: 14.5 % (ref 11.6–15.1)
HCT VFR BLD AUTO: 40.9 % (ref 36.5–49.3)
HGB BLD-MCNC: 13.6 G/DL (ref 12–17)
LYMPHOCYTES # BLD AUTO: 22 % (ref 14–44)
LYMPHOCYTES # BLD AUTO: 3.62 THOUSAND/UL (ref 0.6–4.47)
MAGNESIUM SERPL-MCNC: 1.8 MG/DL (ref 1.6–2.6)
MCH RBC QN AUTO: 30.2 PG (ref 26.8–34.3)
MCHC RBC AUTO-ENTMCNC: 33.3 G/DL (ref 31.4–37.4)
MCV RBC AUTO: 91 FL (ref 82–98)
MONOCYTES # BLD AUTO: 2.14 THOUSAND/UL (ref 0–1.22)
MONOCYTES NFR BLD: 13 % (ref 4–12)
NEUTROPHILS # BLD MANUAL: 10.69 THOUSAND/UL (ref 1.85–7.62)
NEUTS BAND NFR BLD MANUAL: 7 % (ref 0–8)
NEUTS SEG NFR BLD AUTO: 58 % (ref 43–75)
NRBC BLD AUTO-RTO: 0 /100 WBCS
PLATELET # BLD AUTO: 336 THOUSANDS/UL (ref 149–390)
PLATELET BLD QL SMEAR: ADEQUATE
PMV BLD AUTO: 9.1 FL (ref 8.9–12.7)
RBC # BLD AUTO: 4.5 MILLION/UL (ref 3.88–5.62)
TOTAL CELLS COUNTED SPEC: 100
WBC # BLD AUTO: 16.45 THOUSAND/UL (ref 4.31–10.16)

## 2021-02-05 PROCEDURE — 85007 BL SMEAR W/DIFF WBC COUNT: CPT

## 2021-02-05 PROCEDURE — 99214 OFFICE O/P EST MOD 30 MIN: CPT | Performed by: INTERNAL MEDICINE

## 2021-02-05 PROCEDURE — 36415 COLL VENOUS BLD VENIPUNCTURE: CPT | Performed by: INTERNAL MEDICINE

## 2021-02-05 PROCEDURE — 85027 COMPLETE CBC AUTOMATED: CPT

## 2021-02-05 PROCEDURE — 83735 ASSAY OF MAGNESIUM: CPT | Performed by: INTERNAL MEDICINE

## 2021-02-05 NOTE — PROGRESS NOTES
[address]  [address] [facility] [address]  [phone]  [phone]    Nereyda Herrera,1975, [id number]  02/05/21    Discussion:   In summary, this is a 56-year-old male history of recurrent seminoma  He has started  16 and cisplatin  His 2nd cycle would begin on February 15th  So far he seems to be tolerating chemotherapy without any difficulty  He has no nausea, vomiting, neuropathy  CBC and chemistry are normal   We will proceed through his 2nd in into his 3rd cycle  At that time we will restage  Further chemotherapy to follow accordingly  I discussed the above with the patient  The patient  voiced understanding and agreement   ______________________________________________________________________    Chief Complaint   Patient presents with    Follow-up       HPI:  Oncology History Overview Note   Patient presents today for radiation consult for testicular cancer, referred by Dr Enrike De La Cruz  38year old male with a history of developmental delay,  shunt placed at birth  He was evaluated by his PCP in January 2020 for scrotal swelling  US revealed left testicular mass, he was referred to Urology  1/15/20 US scrotum and testicles  IMPRESSION:   Markedly enlarged and abnormal left testicle with normal vascular flow on Doppler interrogation  The finding raises the suspicion of infiltrating process and possibly testicular malignancy (perhaps testicular lymphoma) especially given the history of slowly progressive testicular swelling  FINDINGS:   TESTES:    RIGHT testis = 4 5 x 1 7 x 2 8 cm   The right testicle demonstrates normal contour with homogeneous smooth echotexture  No right-sided intratesticular mass lesion or calcifications    LEFT testis = 15 1 x 9 7 x 14 0 cm  The left testicle is markedly enlarged with profoundly heterogeneous echotexture      1/17/20 Urology, Dr Douglas Casillas for left radical orchiectomy and staging imaging  1/20/20 Left radical orchiectomy     2/3/20 Urology follow-up, Dr Newton Zaragoza  Reviewed pathology, left testicular seminoma, 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  Scrotal skin was negative for tumor involvement, no tumor was seen at the spermatic cord or the spermatic cord margin, and no penetration through the tunica vaginalis was seen  Plan for CT abd/pelvis and chest xray, recheck lab work  Follow-up to review and referral at that time  2/5/20 Bloodwork:  AFP tumor marker: 5 0  HCG tumor marker: < 1  HCG, Quantitative: < 0 6      2/7/20 CT Abd/Pelvis  IMPRESSION:   1   No evidence of metastatic disease in the abdomen or pelvis    2   Large collection in the left hemiscrotum with central hyperdensity likely representing hematoma  Clinical correlation recommended    3   Postoperative stranding in the left inguinal region with prominent lymph nodes which are likely reactive    4   Cholelithiasis  2/7/20 XR chest pa & lateral  IMPRESSION:   No acute cardiopulmonary disease  2/17/20 Urology f/u with Dr Newton Zaragoza  CT negative for metastases, HCG and AFP markers are negative  Refer to Radiation Oncology for consideration of hockey-stick adjuvant radiation therapy  Scrotal hematoma mostly resolved, no signs of infection, follow-up in 3 months  2/18/20 Multidisciplinary Urology Case Review  Physician Recommended Plan:  Referral to Radiation Oncology and Medical Oncology        2/21/20 Dr Jamey Stephens  Favor observation  Prophylactic radiation therapy or chemotherapy are reasonable to consider  Reviewed that likelihood of cure is high, observation is reasonable with a goal of avoiding unnecessary treatment  If recurrent, salvage is generally quite effective  Pt and mother prefer avoiding therapy unless necessary  Plan for follow-up in May with repeat imaging and bloodwork to initiate surveillance        5/8/20 CT abd/pelvis and CXR  5/12/20 Dr Newton Zaragoza Urology follow-up  5/15/20 Med Onc Dr Abbott Overcast follow-up           Testicular seminoma, left [facility])   1/2020 Initial Diagnosis    Testicular seminoma, left ([address] )     1/20/2020 Surgery    Left radical orchiectomy inguinal, patrial scrotectomy (Left)-combination of inguinal and scrotal approach due to size     A  Left testis and spermatic cord:  - Seminoma, 15 5 cm in greatest dimension, with extension beyond tunica albuginea as evidenced by tumor in epididymis and in hilar fat  No penetration through tunica vaginalis is appreciated  - No tumor seen within spermatic cord or at spermatic cord margin  - See synoptic report below     B  Scrotal skin:  - No tumor seen         1/18/2021 -  Chemotherapy    pegfilgrastim (NEULASTA ONPRO) subcutaneous injection kit 6 mg, 6 mg, Subcutaneous, Once, 1 of 4 cycles  Administration: 6 mg (1/29/2021)  CISplatin (PLATINOL) 50 mg in sodium chloride 0 9 % 500 mL infusion, 48 8 mg, Intravenous, Once, 1 of 4 cycles  Administration: 50 mg (1/18/2021), 50 mg (1/19/2021), 50 mg (1/20/2021), 50 mg (1/28/2021), 50 mg (1/29/2021)  fosaprepitant (EMEND) 150 mg in sodium chloride 0 9 % 250 mL IVPB, 150 mg, Intravenous, Once, 1 of 4 cycles  Administration: 150 mg (1/18/2021), 150 mg (1/28/2021)  etoposide (TOPOSAR) 244 mg in sodium chloride 0 9 % 1,000 mL chemo infusion, 100 mg/m2 = 244 mg, Intravenous, Once, 1 of 4 cycles  Administration: 244 mg (1/18/2021), 244 mg (1/19/2021), 244 mg (1/20/2021), 244 mg (1/28/2021), 244 mg (1/29/2021)         Interval History:  Clinically stable  ECOG-  0 - Asymptomatic    Review of Systems   Constitutional: Negative for chills and fever  HENT: Negative for nosebleeds  Eyes: Negative for discharge  Respiratory: Negative for cough and shortness of breath  Cardiovascular: Negative for chest pain  Gastrointestinal: Negative for abdominal pain, constipation and diarrhea  Endocrine: Negative for polydipsia  Genitourinary: Negative for hematuria     Musculoskeletal: Negative for arthralgias  Skin: Negative for color change  Allergic/Immunologic: Negative for immunocompromised state  Neurological: Negative for dizziness and headaches  Hematological: Negative for adenopathy  Psychiatric/Behavioral: Negative for agitation  Past Medical History:   Diagnosis Date    Cleft hard palate     Mass of left testicle     [address] [address]     Testicular mass 1/16/2020    Added automatically from request for surgery [id number]     Patient Active Problem List   Diagnosis    Mentally challenged    Tobacco abuse    Nonepileptic episode ([facility] [address] )    S/P ventriculoperitoneal shunt    Chronic pain of right hip    Arachnoid cyst    Testicular seminoma, left ([facility] [address]    Prevention of chemotherapy-induced neutropenia    Chemotherapy induced nausea and vomiting       Current Outpatient Medications:     meloxicam (MOBIC) 15 mg tablet, Take 1 tablet (15 mg total) by mouth daily, Disp: 90 tablet, Rfl: 3    ondansetron (ZOFRAN) 8 mg tablet, Take 1 tablet (8 mg total) by mouth every 8 (eight) hours as needed for nausea or vomiting, Disp: 20 tablet, Rfl: 2  No Known Allergies  Past Surgical History:   Procedure Laterality Date    CLEFT LIP REPAIR      IR BIOPSY RETROPERITONEUM  12/22/2020    ORCHIECTOMY Left     MA REMOVAL TESTIS,RADICAL Left 1/20/2020    Procedure: LEFT RADICAL ORCHIECTOMY INGUINAL, patrial scrotectomy;  Surgeon: Dheeraj Shelton MD;  Location: [facility];  Service: Urology     Social History     Objective:  Vitals:    02/05/21 1530   BP: 134/76   BP Location: Right arm   Patient Position: Sitting   Pulse: 92   Resp: 18   Temp: 98 8 °F (37 1 °C)   TempSrc: Tympanic   SpO2: 96%   Weight: 119 kg (262 lb)   Height: 6' 2" (1 88 m)     Physical Exam  Constitutional:       Appearance: He is well-developed  HENT:      Head: Normocephalic and atraumatic  Eyes:      Pupils: Pupils are equal, round, and reactive to light  Neck:      Musculoskeletal: Neck supple     Cardiovascular: Rate and Rhythm: Normal rate and regular rhythm  Heart sounds: No murmur  Pulmonary:      Breath sounds: Normal breath sounds  No wheezing or rales  Abdominal:      Palpations: Abdomen is soft  Tenderness: There is no abdominal tenderness  Musculoskeletal: Normal range of motion  General: No tenderness  Lymphadenopathy:      Cervical: No cervical adenopathy  Skin:     Findings: No erythema or rash  Neurological:      Mental Status: He is alert and oriented to person, place, and time  Cranial Nerves: No cranial nerve deficit  Deep Tendon Reflexes: Reflexes are normal and symmetric  Psychiatric:         Behavior: Behavior normal            Labs: I personally reviewed the labs and imaging pertinent to this patient care

## 2021-02-08 DIAGNOSIS — T45.1X5A CHEMOTHERAPY INDUCED NAUSEA AND VOMITING: Primary | ICD-10-CM

## 2021-02-08 DIAGNOSIS — C62.92 TESTICULAR SEMINOMA, LEFT (HCC): ICD-10-CM

## 2021-02-08 DIAGNOSIS — Z76.89 PREVENTION OF CHEMOTHERAPY-INDUCED NEUTROPENIA: ICD-10-CM

## 2021-02-08 DIAGNOSIS — R11.2 CHEMOTHERAPY INDUCED NAUSEA AND VOMITING: Primary | ICD-10-CM

## 2021-02-08 RX ORDER — SODIUM CHLORIDE 9 MG/ML
20 INJECTION, SOLUTION INTRAVENOUS ONCE
Status: CANCELLED | OUTPATIENT
Start: 2021-02-17

## 2021-02-08 RX ORDER — SODIUM CHLORIDE 9 MG/ML
20 INJECTION, SOLUTION INTRAVENOUS ONCE
Status: CANCELLED | OUTPATIENT
Start: 2021-02-15

## 2021-02-08 RX ORDER — SODIUM CHLORIDE 9 MG/ML
20 INJECTION, SOLUTION INTRAVENOUS ONCE
Status: CANCELLED | OUTPATIENT
Start: 2021-02-16

## 2021-02-08 RX ORDER — SODIUM CHLORIDE 9 MG/ML
20 INJECTION, SOLUTION INTRAVENOUS ONCE
Status: CANCELLED | OUTPATIENT
Start: 2021-02-22

## 2021-02-08 RX ORDER — SODIUM CHLORIDE 9 MG/ML
20 INJECTION, SOLUTION INTRAVENOUS ONCE
Status: CANCELLED | OUTPATIENT
Start: 2021-02-18

## 2021-02-12 ENCOUNTER — APPOINTMENT (OUTPATIENT)
Dept: LAB | Facility: HOSPITAL | Age: 46
End: 2021-02-12
Attending: INTERNAL MEDICINE
Payer: MEDICARE

## 2021-02-12 DIAGNOSIS — C62.92 TESTICULAR SEMINOMA, LEFT (HCC): ICD-10-CM

## 2021-02-12 DIAGNOSIS — Z76.89 PREVENTION OF CHEMOTHERAPY-INDUCED NEUTROPENIA: ICD-10-CM

## 2021-02-12 DIAGNOSIS — R11.2 CHEMOTHERAPY INDUCED NAUSEA AND VOMITING: ICD-10-CM

## 2021-02-12 DIAGNOSIS — T45.1X5A CHEMOTHERAPY INDUCED NAUSEA AND VOMITING: ICD-10-CM

## 2021-02-12 LAB
ALBUMIN SERPL BCP-MCNC: 3.7 G/DL (ref 3.5–5)
ALP SERPL-CCNC: 115 U/L (ref 46–116)
ALT SERPL W P-5'-P-CCNC: 44 U/L (ref 12–78)
ANION GAP SERPL CALCULATED.3IONS-SCNC: 12 MMOL/L (ref 4–13)
AST SERPL W P-5'-P-CCNC: 19 U/L (ref 5–45)
BASOPHILS # BLD AUTO: 0.08 THOUSANDS/ΜL (ref 0–0.1)
BASOPHILS NFR BLD AUTO: 1 % (ref 0–1)
BILIRUB SERPL-MCNC: 0.6 MG/DL (ref 0.2–1)
BUN SERPL-MCNC: 13 MG/DL (ref 5–25)
CALCIUM SERPL-MCNC: 9.6 MG/DL (ref 8.3–10.1)
CHLORIDE SERPL-SCNC: 103 MMOL/L (ref 100–108)
CO2 SERPL-SCNC: 24 MMOL/L (ref 21–32)
CREAT SERPL-MCNC: 1.12 MG/DL (ref 0.6–1.3)
EOSINOPHIL # BLD AUTO: 0.03 THOUSAND/ΜL (ref 0–0.61)
EOSINOPHIL NFR BLD AUTO: 0 % (ref 0–6)
ERYTHROCYTE [DISTWIDTH] IN BLOOD BY AUTOMATED COUNT: 14.6 % (ref 11.6–15.1)
GFR SERPL CREATININE-BSD FRML MDRD: 79 ML/MIN/1.73SQ M
GLUCOSE P FAST SERPL-MCNC: 101 MG/DL (ref 65–99)
HCT VFR BLD AUTO: 42.9 % (ref 36.5–49.3)
HGB BLD-MCNC: 14.2 G/DL (ref 12–17)
IMM GRANULOCYTES # BLD AUTO: 0.13 THOUSAND/UL (ref 0–0.2)
IMM GRANULOCYTES NFR BLD AUTO: 1 % (ref 0–2)
LYMPHOCYTES # BLD AUTO: 2.76 THOUSANDS/ΜL (ref 0.6–4.47)
LYMPHOCYTES NFR BLD AUTO: 22 % (ref 14–44)
MCH RBC QN AUTO: 30.1 PG (ref 26.8–34.3)
MCHC RBC AUTO-ENTMCNC: 33.1 G/DL (ref 31.4–37.4)
MCV RBC AUTO: 91 FL (ref 82–98)
MONOCYTES # BLD AUTO: 0.81 THOUSAND/ΜL (ref 0.17–1.22)
MONOCYTES NFR BLD AUTO: 6 % (ref 4–12)
NEUTROPHILS # BLD AUTO: 8.99 THOUSANDS/ΜL (ref 1.85–7.62)
NEUTS SEG NFR BLD AUTO: 70 % (ref 43–75)
NRBC BLD AUTO-RTO: 0 /100 WBCS
PLATELET # BLD AUTO: 237 THOUSANDS/UL (ref 149–390)
PMV BLD AUTO: 10.1 FL (ref 8.9–12.7)
POTASSIUM SERPL-SCNC: 4.2 MMOL/L (ref 3.5–5.3)
PROT SERPL-MCNC: 7.3 G/DL (ref 6.4–8.2)
RBC # BLD AUTO: 4.72 MILLION/UL (ref 3.88–5.62)
SODIUM SERPL-SCNC: 139 MMOL/L (ref 136–145)
WBC # BLD AUTO: 12.8 THOUSAND/UL (ref 4.31–10.16)

## 2021-02-12 PROCEDURE — 36415 COLL VENOUS BLD VENIPUNCTURE: CPT

## 2021-02-12 PROCEDURE — 85025 COMPLETE CBC W/AUTO DIFF WBC: CPT

## 2021-02-12 PROCEDURE — 80053 COMPREHEN METABOLIC PANEL: CPT

## 2021-02-15 ENCOUNTER — HOSPITAL ENCOUNTER (OUTPATIENT)
Dept: INFUSION CENTER | Facility: HOSPITAL | Age: 46
Discharge: HOME | End: 2021-02-15
Attending: INTERNAL MEDICINE
Payer: MEDICARE

## 2021-02-15 VITALS
SYSTOLIC BLOOD PRESSURE: 150 MMHG | DIASTOLIC BLOOD PRESSURE: 80 MMHG | WEIGHT: 262.35 LBS | BODY MASS INDEX: 33.67 KG/M2 | HEIGHT: 74 IN | HEART RATE: 92 BPM | RESPIRATION RATE: 20 BRPM | TEMPERATURE: 98 F

## 2021-02-15 DIAGNOSIS — Z76.89 PREVENTION OF CHEMOTHERAPY-INDUCED NEUTROPENIA: ICD-10-CM

## 2021-02-15 DIAGNOSIS — R11.2 CHEMOTHERAPY INDUCED NAUSEA AND VOMITING: Primary | ICD-10-CM

## 2021-02-15 DIAGNOSIS — T45.1X5A CHEMOTHERAPY INDUCED NAUSEA AND VOMITING: Primary | ICD-10-CM

## 2021-02-15 DIAGNOSIS — C62.92 TESTICULAR SEMINOMA, LEFT (HCC): ICD-10-CM

## 2021-02-15 PROCEDURE — 96413 CHEMO IV INFUSION 1 HR: CPT

## 2021-02-15 PROCEDURE — 96361 HYDRATE IV INFUSION ADD-ON: CPT

## 2021-02-15 PROCEDURE — 96367 TX/PROPH/DG ADDL SEQ IV INF: CPT

## 2021-02-15 PROCEDURE — 96417 CHEMO IV INFUS EACH ADDL SEQ: CPT

## 2021-02-15 RX ORDER — SODIUM CHLORIDE 9 MG/ML
20 INJECTION, SOLUTION INTRAVENOUS ONCE
Status: COMPLETED | OUTPATIENT
Start: 2021-02-15 | End: 2021-02-15

## 2021-02-15 RX ADMIN — ETOPOSIDE 244 MG: 20 INJECTION INTRAVENOUS at 11:54

## 2021-02-15 RX ADMIN — FOSAPREPITANT 150 MG: 150 INJECTION, POWDER, LYOPHILIZED, FOR SOLUTION INTRAVENOUS at 10:00

## 2021-02-15 RX ADMIN — CISPLATIN 50 MG: 1 INJECTION INTRAVENOUS at 10:39

## 2021-02-15 RX ADMIN — DEXAMETHASONE SODIUM PHOSPHATE: 10 INJECTION, SOLUTION INTRAMUSCULAR; INTRAVENOUS at 09:37

## 2021-02-15 RX ADMIN — SODIUM CHLORIDE 500 ML: 0.9 INJECTION, SOLUTION INTRAVENOUS at 13:03

## 2021-02-15 RX ADMIN — SODIUM CHLORIDE 500 ML: 0.9 INJECTION, SOLUTION INTRAVENOUS at 08:30

## 2021-02-15 RX ADMIN — SODIUM CHLORIDE 20 ML/HR: 0.9 INJECTION, SOLUTION INTRAVENOUS at 09:37

## 2021-02-15 NOTE — PLAN OF CARE
Problem: Potential for Falls  Goal: Patient will remain free of falls  Description: INTERVENTIONS:  - Assess patient frequently for physical needs  -  Identify cognitive and physical deficits and behaviors that affect risk of falls  -  [place] fall precautions as indicated by assessment   - Educate patient/family on patient safety including physical limitations  - Instruct patient to call for assistance with activity based on assessment  - Modify environment to reduce risk of injury  - Consider OT/PT consult to assist with strengthening/mobility  Outcome: Progressing     Problem: Knowledge Deficit  Goal: Patient/family/caregiver demonstrates understanding of disease process, treatment plan, medications, and discharge instructions  Description: Complete learning assessment and assess knowledge base    Interventions:  - Provide teaching at level of understanding  - Provide teaching via preferred learning methods  Outcome: Progressing

## 2021-02-15 NOTE — PROGRESS NOTES
Pt tolerated cisplatin/etoposide with pre/post hydration without difficulty  No s/s reaction noted  Tomorrow's appt confirmed    Left ambulatory with AVS

## 2021-02-16 ENCOUNTER — HOSPITAL ENCOUNTER (OUTPATIENT)
Dept: INFUSION CENTER | Facility: HOSPITAL | Age: 46
Discharge: HOME | End: 2021-02-16
Attending: INTERNAL MEDICINE
Payer: MEDICARE

## 2021-02-16 VITALS
SYSTOLIC BLOOD PRESSURE: 118 MMHG | HEIGHT: 74 IN | TEMPERATURE: 98.3 F | RESPIRATION RATE: 18 BRPM | WEIGHT: 262.35 LBS | DIASTOLIC BLOOD PRESSURE: 59 MMHG | HEART RATE: 70 BPM | BODY MASS INDEX: 33.67 KG/M2

## 2021-02-16 DIAGNOSIS — R11.2 CHEMOTHERAPY INDUCED NAUSEA AND VOMITING: Primary | ICD-10-CM

## 2021-02-16 DIAGNOSIS — T45.1X5A CHEMOTHERAPY INDUCED NAUSEA AND VOMITING: Primary | ICD-10-CM

## 2021-02-16 DIAGNOSIS — Z76.89 PREVENTION OF CHEMOTHERAPY-INDUCED NEUTROPENIA: ICD-10-CM

## 2021-02-16 DIAGNOSIS — C62.92 TESTICULAR SEMINOMA, LEFT (HCC): ICD-10-CM

## 2021-02-16 PROCEDURE — 96413 CHEMO IV INFUSION 1 HR: CPT

## 2021-02-16 PROCEDURE — 96361 HYDRATE IV INFUSION ADD-ON: CPT

## 2021-02-16 PROCEDURE — 96417 CHEMO IV INFUS EACH ADDL SEQ: CPT

## 2021-02-16 PROCEDURE — 96367 TX/PROPH/DG ADDL SEQ IV INF: CPT

## 2021-02-16 RX ORDER — SODIUM CHLORIDE 9 MG/ML
20 INJECTION, SOLUTION INTRAVENOUS ONCE
Status: COMPLETED | OUTPATIENT
Start: 2021-02-16 | End: 2021-02-16

## 2021-02-16 RX ADMIN — SODIUM CHLORIDE 20 ML/HR: 0.9 INJECTION, SOLUTION INTRAVENOUS at 10:42

## 2021-02-16 RX ADMIN — ETOPOSIDE 244 MG: 20 INJECTION INTRAVENOUS at 13:13

## 2021-02-16 RX ADMIN — SODIUM CHLORIDE 500 ML: 0.9 INJECTION, SOLUTION INTRAVENOUS at 10:43

## 2021-02-16 RX ADMIN — DEXAMETHASONE SODIUM PHOSPHATE: 10 INJECTION, SOLUTION INTRAMUSCULAR; INTRAVENOUS at 11:39

## 2021-02-16 RX ADMIN — SODIUM CHLORIDE 500 ML: 0.9 INJECTION, SOLUTION INTRAVENOUS at 14:20

## 2021-02-16 RX ADMIN — CISPLATIN 50 MG: 1 INJECTION INTRAVENOUS at 12:03

## 2021-02-16 NOTE — PROGRESS NOTES
Pt tolerated todays cisplatin, etoposide well  No adverse reactions noted  Peripheral iv discontinued jelco intact   Discharged ambulatory with avs

## 2021-02-16 NOTE — PLAN OF CARE
Problem: Potential for Falls  Goal: Patient will remain free of falls  Description: INTERVENTIONS:  - Assess patient frequently for physical needs  -  Identify cognitive and physical deficits and behaviors that affect risk of falls    -  [place] fall precautions as indicated by assessment   - Educate patient/family on patient safety including physical limitations  - Instruct patient to call for assistance with activity based on assessment  - Modify environment to reduce risk of injury  - Consider OT/PT consult to assist with strengthening/mobility  Outcome: Progressing

## 2021-02-17 ENCOUNTER — PATIENT OUTREACH (OUTPATIENT)
Dept: CASE MANAGEMENT | Facility: HOSPITAL | Age: 46
End: 2021-02-17

## 2021-02-17 ENCOUNTER — HOSPITAL ENCOUNTER (OUTPATIENT)
Dept: INFUSION CENTER | Facility: HOSPITAL | Age: 46
Discharge: HOME | End: 2021-02-17
Attending: INTERNAL MEDICINE
Payer: MEDICARE

## 2021-02-17 VITALS
WEIGHT: 262.35 LBS | RESPIRATION RATE: 18 BRPM | SYSTOLIC BLOOD PRESSURE: 169 MMHG | HEIGHT: 74 IN | BODY MASS INDEX: 33.67 KG/M2 | HEART RATE: 82 BPM | TEMPERATURE: 97.3 F | DIASTOLIC BLOOD PRESSURE: 85 MMHG

## 2021-02-17 DIAGNOSIS — T45.1X5A CHEMOTHERAPY INDUCED NAUSEA AND VOMITING: Primary | ICD-10-CM

## 2021-02-17 DIAGNOSIS — Z78.9 NEED FOR FOLLOW-UP BY SOCIAL WORKER: Primary | ICD-10-CM

## 2021-02-17 DIAGNOSIS — R11.2 CHEMOTHERAPY INDUCED NAUSEA AND VOMITING: Primary | ICD-10-CM

## 2021-02-17 DIAGNOSIS — C62.92 TESTICULAR SEMINOMA, LEFT (HCC): ICD-10-CM

## 2021-02-17 DIAGNOSIS — Z76.89 PREVENTION OF CHEMOTHERAPY-INDUCED NEUTROPENIA: ICD-10-CM

## 2021-02-17 PROCEDURE — 96361 HYDRATE IV INFUSION ADD-ON: CPT

## 2021-02-17 PROCEDURE — 96417 CHEMO IV INFUS EACH ADDL SEQ: CPT

## 2021-02-17 PROCEDURE — 96413 CHEMO IV INFUSION 1 HR: CPT

## 2021-02-17 PROCEDURE — 96367 TX/PROPH/DG ADDL SEQ IV INF: CPT

## 2021-02-17 RX ORDER — SODIUM CHLORIDE 9 MG/ML
20 INJECTION, SOLUTION INTRAVENOUS ONCE
Status: COMPLETED | OUTPATIENT
Start: 2021-02-17 | End: 2021-02-17

## 2021-02-17 RX ADMIN — DEXAMETHASONE SODIUM PHOSPHATE: 10 INJECTION, SOLUTION INTRAMUSCULAR; INTRAVENOUS at 11:28

## 2021-02-17 RX ADMIN — SODIUM CHLORIDE 20 ML/HR: 0.9 INJECTION, SOLUTION INTRAVENOUS at 10:16

## 2021-02-17 RX ADMIN — SODIUM CHLORIDE 500 ML: 0.9 INJECTION, SOLUTION INTRAVENOUS at 10:22

## 2021-02-17 RX ADMIN — SODIUM CHLORIDE 500 ML: 0.9 INJECTION, SOLUTION INTRAVENOUS at 14:10

## 2021-02-17 RX ADMIN — ETOPOSIDE 244 MG: 20 INJECTION INTRAVENOUS at 13:09

## 2021-02-17 RX ADMIN — CISPLATIN 50 MG: 1 INJECTION INTRAVENOUS at 11:47

## 2021-02-17 NOTE — PROGRESS NOTES
Oncology LSW met with PT chairside in the infusion center to introduce herself and to see if PT had any needs that LSW could help address  PT reported that he was "doing fine " LSW asked PT how his support was in the home, to which PT reported he lives with his brother and mother  PT also explained that his brother is his transport to appointments  PT reported that he did not have any other areas of concern at this time  LSW and PT then engaged in light conversation, discussing PT's favorite movies, music, tv shows, and sports teams

## 2021-02-17 NOTE — PROGRESS NOTES
Pt admitted to infusion for day 3 of cisplatin and etoposide  Tolerated well  Peripheral iv discontinued jelco intact  Discharged ambulatory with avs  Aware of 8 am appt  Instructed to call us if pt cannot make it due to inclement weather

## 2021-02-18 ENCOUNTER — HOSPITAL ENCOUNTER (OUTPATIENT)
Dept: INFUSION CENTER | Facility: HOSPITAL | Age: 46
Discharge: HOME | End: 2021-02-18
Attending: INTERNAL MEDICINE
Payer: MEDICARE

## 2021-02-18 VITALS
DIASTOLIC BLOOD PRESSURE: 90 MMHG | HEART RATE: 88 BPM | RESPIRATION RATE: 18 BRPM | BODY MASS INDEX: 33.67 KG/M2 | SYSTOLIC BLOOD PRESSURE: 168 MMHG | TEMPERATURE: 97.6 F | HEIGHT: 74 IN | WEIGHT: 262.35 LBS

## 2021-02-18 DIAGNOSIS — R11.2 CHEMOTHERAPY INDUCED NAUSEA AND VOMITING: Primary | ICD-10-CM

## 2021-02-18 DIAGNOSIS — C62.92 TESTICULAR SEMINOMA, LEFT (HCC): ICD-10-CM

## 2021-02-18 DIAGNOSIS — T45.1X5A CHEMOTHERAPY INDUCED NAUSEA AND VOMITING: Primary | ICD-10-CM

## 2021-02-18 DIAGNOSIS — Z76.89 PREVENTION OF CHEMOTHERAPY-INDUCED NEUTROPENIA: ICD-10-CM

## 2021-02-18 PROCEDURE — 96417 CHEMO IV INFUS EACH ADDL SEQ: CPT

## 2021-02-18 PROCEDURE — 96361 HYDRATE IV INFUSION ADD-ON: CPT

## 2021-02-18 PROCEDURE — 96413 CHEMO IV INFUSION 1 HR: CPT

## 2021-02-18 PROCEDURE — 96367 TX/PROPH/DG ADDL SEQ IV INF: CPT

## 2021-02-18 RX ORDER — SODIUM CHLORIDE 9 MG/ML
20 INJECTION, SOLUTION INTRAVENOUS ONCE
Status: COMPLETED | OUTPATIENT
Start: 2021-02-18 | End: 2021-02-18

## 2021-02-18 RX ADMIN — SODIUM CHLORIDE 500 ML: 0.9 INJECTION, SOLUTION INTRAVENOUS at 12:20

## 2021-02-18 RX ADMIN — SODIUM CHLORIDE 20 ML/HR: 0.9 INJECTION, SOLUTION INTRAVENOUS at 09:21

## 2021-02-18 RX ADMIN — CISPLATIN 50 MG: 1 INJECTION INTRAVENOUS at 10:04

## 2021-02-18 RX ADMIN — DEXAMETHASONE SODIUM PHOSPHATE: 10 INJECTION, SOLUTION INTRAMUSCULAR; INTRAVENOUS at 09:30

## 2021-02-18 RX ADMIN — ETOPOSIDE 244 MG: 20 INJECTION INTRAVENOUS at 11:09

## 2021-02-18 RX ADMIN — SODIUM CHLORIDE 500 ML: 0.9 INJECTION, SOLUTION INTRAVENOUS at 08:21

## 2021-02-18 NOTE — PROGRESS NOTES
Chemo completed without incident  PT offers no complaints on D/C  Pt is aware of his appt time  He verbalizes understanding of calling infusion center in the a m  if he can not make appt due to inclement weather

## 2021-02-19 ENCOUNTER — APPOINTMENT (OUTPATIENT)
Dept: LAB | Facility: HOSPITAL | Age: 46
End: 2021-02-19
Attending: INTERNAL MEDICINE
Payer: MEDICARE

## 2021-02-19 ENCOUNTER — PATIENT OUTREACH (OUTPATIENT)
Dept: CASE MANAGEMENT | Facility: HOSPITAL | Age: 46
End: 2021-02-19

## 2021-02-19 ENCOUNTER — HOSPITAL ENCOUNTER (OUTPATIENT)
Dept: INFUSION CENTER | Facility: HOSPITAL | Age: 46
Discharge: HOME | End: 2021-02-19
Attending: INTERNAL MEDICINE

## 2021-02-19 DIAGNOSIS — Z76.89 PREVENTION OF CHEMOTHERAPY-INDUCED NEUTROPENIA: ICD-10-CM

## 2021-02-19 DIAGNOSIS — T45.1X5A CHEMOTHERAPY INDUCED NAUSEA AND VOMITING: ICD-10-CM

## 2021-02-19 DIAGNOSIS — R11.2 CHEMOTHERAPY INDUCED NAUSEA AND VOMITING: ICD-10-CM

## 2021-02-19 DIAGNOSIS — C62.92 TESTICULAR SEMINOMA, LEFT (HCC): ICD-10-CM

## 2021-02-19 LAB
ALBUMIN SERPL BCP-MCNC: 3.5 G/DL (ref 3.5–5)
ALP SERPL-CCNC: 81 U/L (ref 46–116)
ALT SERPL W P-5'-P-CCNC: 42 U/L (ref 12–78)
ANION GAP SERPL CALCULATED.3IONS-SCNC: 6 MMOL/L (ref 4–13)
AST SERPL W P-5'-P-CCNC: 18 U/L (ref 5–45)
BASOPHILS # BLD AUTO: 0.01 THOUSANDS/ΜL (ref 0–0.1)
BASOPHILS NFR BLD AUTO: 0 % (ref 0–1)
BILIRUB SERPL-MCNC: 1 MG/DL (ref 0.2–1)
BUN SERPL-MCNC: 23 MG/DL (ref 5–25)
CALCIUM SERPL-MCNC: 8.9 MG/DL (ref 8.3–10.1)
CHLORIDE SERPL-SCNC: 100 MMOL/L (ref 100–108)
CO2 SERPL-SCNC: 28 MMOL/L (ref 21–32)
CREAT SERPL-MCNC: 1.11 MG/DL (ref 0.6–1.3)
EOSINOPHIL # BLD AUTO: 0.01 THOUSAND/ΜL (ref 0–0.61)
EOSINOPHIL NFR BLD AUTO: 0 % (ref 0–6)
ERYTHROCYTE [DISTWIDTH] IN BLOOD BY AUTOMATED COUNT: 14.8 % (ref 11.6–15.1)
GFR SERPL CREATININE-BSD FRML MDRD: 80 ML/MIN/1.73SQ M
GLUCOSE SERPL-MCNC: 127 MG/DL (ref 65–140)
HCT VFR BLD AUTO: 38 % (ref 36.5–49.3)
HGB BLD-MCNC: 12.7 G/DL (ref 12–17)
IMM GRANULOCYTES # BLD AUTO: 0.02 THOUSAND/UL (ref 0–0.2)
IMM GRANULOCYTES NFR BLD AUTO: 0 % (ref 0–2)
LYMPHOCYTES # BLD AUTO: 2.94 THOUSANDS/ΜL (ref 0.6–4.47)
LYMPHOCYTES NFR BLD AUTO: 38 % (ref 14–44)
MCH RBC QN AUTO: 30 PG (ref 26.8–34.3)
MCHC RBC AUTO-ENTMCNC: 33.4 G/DL (ref 31.4–37.4)
MCV RBC AUTO: 90 FL (ref 82–98)
MONOCYTES # BLD AUTO: 0.2 THOUSAND/ΜL (ref 0.17–1.22)
MONOCYTES NFR BLD AUTO: 3 % (ref 4–12)
NEUTROPHILS # BLD AUTO: 4.62 THOUSANDS/ΜL (ref 1.85–7.62)
NEUTS SEG NFR BLD AUTO: 59 % (ref 43–75)
NRBC BLD AUTO-RTO: 0 /100 WBCS
PLATELET # BLD AUTO: 312 THOUSANDS/UL (ref 149–390)
PMV BLD AUTO: 9.5 FL (ref 8.9–12.7)
POTASSIUM SERPL-SCNC: 3.5 MMOL/L (ref 3.5–5.3)
PROT SERPL-MCNC: 6.8 G/DL (ref 6.4–8.2)
RBC # BLD AUTO: 4.24 MILLION/UL (ref 3.88–5.62)
SODIUM SERPL-SCNC: 134 MMOL/L (ref 136–145)
WBC # BLD AUTO: 7.8 THOUSAND/UL (ref 4.31–10.16)

## 2021-02-19 PROCEDURE — 80053 COMPREHEN METABOLIC PANEL: CPT

## 2021-02-19 PROCEDURE — 36415 COLL VENOUS BLD VENIPUNCTURE: CPT

## 2021-02-19 PROCEDURE — 85025 COMPLETE CBC W/AUTO DIFF WBC: CPT

## 2021-02-19 NOTE — PROGRESS NOTES
Oncology LSW received voicemail from PT's mother, Chan Fortune, reporting that PT would not be in for infusion treatment today due to the weather  LSW sent this information via in-basket to [phone] [facility] Infusion staff nurses

## 2021-02-22 ENCOUNTER — TELEPHONE (OUTPATIENT)
Dept: HEMATOLOGY ONCOLOGY | Facility: CLINIC | Age: 46
End: 2021-02-22

## 2021-02-22 ENCOUNTER — HOSPITAL ENCOUNTER (OUTPATIENT)
Dept: INFUSION CENTER | Facility: HOSPITAL | Age: 46
Discharge: HOME | End: 2021-02-22
Attending: INTERNAL MEDICINE
Payer: MEDICARE

## 2021-02-22 VITALS
RESPIRATION RATE: 18 BRPM | BODY MASS INDEX: 32.99 KG/M2 | TEMPERATURE: 98.6 F | SYSTOLIC BLOOD PRESSURE: 148 MMHG | DIASTOLIC BLOOD PRESSURE: 84 MMHG | HEART RATE: 88 BPM | HEIGHT: 74 IN | WEIGHT: 257.06 LBS

## 2021-02-22 DIAGNOSIS — T45.1X5A CHEMOTHERAPY INDUCED NAUSEA AND VOMITING: Primary | ICD-10-CM

## 2021-02-22 DIAGNOSIS — C62.92 TESTICULAR SEMINOMA, LEFT (HCC): ICD-10-CM

## 2021-02-22 DIAGNOSIS — R11.2 CHEMOTHERAPY INDUCED NAUSEA AND VOMITING: Primary | ICD-10-CM

## 2021-02-22 DIAGNOSIS — Z76.89 PREVENTION OF CHEMOTHERAPY-INDUCED NEUTROPENIA: ICD-10-CM

## 2021-02-22 PROCEDURE — 96361 HYDRATE IV INFUSION ADD-ON: CPT

## 2021-02-22 PROCEDURE — 96367 TX/PROPH/DG ADDL SEQ IV INF: CPT

## 2021-02-22 PROCEDURE — 96377 APPLICATON ON-BODY INJECTOR: CPT

## 2021-02-22 PROCEDURE — 96417 CHEMO IV INFUS EACH ADDL SEQ: CPT

## 2021-02-22 PROCEDURE — 96413 CHEMO IV INFUSION 1 HR: CPT

## 2021-02-22 RX ORDER — SODIUM CHLORIDE 9 MG/ML
20 INJECTION, SOLUTION INTRAVENOUS ONCE
Status: COMPLETED | OUTPATIENT
Start: 2021-02-22 | End: 2021-02-22

## 2021-02-22 RX ADMIN — DEXAMETHASONE SODIUM PHOSPHATE: 10 INJECTION, SOLUTION INTRAMUSCULAR; INTRAVENOUS at 10:36

## 2021-02-22 RX ADMIN — CISPLATIN 50 MG: 1 INJECTION INTRAVENOUS at 11:09

## 2021-02-22 RX ADMIN — SODIUM CHLORIDE 20 ML/HR: 0.9 INJECTION, SOLUTION INTRAVENOUS at 10:36

## 2021-02-22 RX ADMIN — SODIUM CHLORIDE 500 ML: 0.9 INJECTION, SOLUTION INTRAVENOUS at 09:53

## 2021-02-22 RX ADMIN — PEGFILGRASTIM 6 MG: KIT SUBCUTANEOUS at 14:44

## 2021-02-22 RX ADMIN — SODIUM CHLORIDE 500 ML: 0.9 INJECTION, SOLUTION INTRAVENOUS at 13:40

## 2021-02-22 RX ADMIN — ETOPOSIDE 244 MG: 20 INJECTION INTRAVENOUS at 12:21

## 2021-02-22 NOTE — PROGRESS NOTES
Pt tolerated treatment without adverse reaction  Left unit with Onpro intact left arm  Pt knowledgeable about monitoring and removing device  Aware of next appointment   AVS given

## 2021-02-22 NOTE — PLAN OF CARE
Problem: Potential for Falls  Goal: Patient will remain free of falls  Description: INTERVENTIONS:  - Assess patient frequently for physical needs  -  Identify cognitive and physical deficits and behaviors that affect risk of falls    -  [place] fall precautions as indicated by assessment   - Educate patient/family on patient safety including physical limitations  - Instruct patient to call for assistance with activity based on assessment  - Modify environment to reduce risk of injury  - Consider OT/PT consult to assist with strengthening/mobility  Outcome: Progressing

## 2021-02-24 ENCOUNTER — TELEPHONE (OUTPATIENT)
Dept: HEMATOLOGY ONCOLOGY | Facility: CLINIC | Age: 46
End: 2021-02-24

## 2021-02-26 ENCOUNTER — OFFICE VISIT (OUTPATIENT)
Dept: HEMATOLOGY ONCOLOGY | Facility: CLINIC | Age: 46
End: 2021-02-26
Payer: MEDICARE

## 2021-02-26 ENCOUNTER — LAB (OUTPATIENT)
Dept: LAB | Facility: HOSPITAL | Age: 46
End: 2021-02-26
Payer: MEDICARE

## 2021-02-26 VITALS
SYSTOLIC BLOOD PRESSURE: 142 MMHG | TEMPERATURE: 98.5 F | DIASTOLIC BLOOD PRESSURE: 79 MMHG | WEIGHT: 264 LBS | BODY MASS INDEX: 33.88 KG/M2 | HEART RATE: 90 BPM | HEIGHT: 74 IN

## 2021-02-26 DIAGNOSIS — C62.92 TESTICULAR SEMINOMA, LEFT (HCC): Primary | ICD-10-CM

## 2021-02-26 PROCEDURE — 99213 OFFICE O/P EST LOW 20 MIN: CPT | Performed by: NURSE PRACTITIONER

## 2021-02-26 NOTE — PROGRESS NOTES
[address] [facility] HEMATOLOGY ONCOLOGY SPECIALISTS [place]  [address] [facility]  [address] [place] [address] [phone]  Progress Note  Anjana Guerra, 1975, [id number]  2/26/2021    Assessment/Plan:  1  Testicular seminoma, left [facility])   Patient is a 50-year-old male with a history of recurrent seminoma currently on  16 and cisplatin  He has finished his 3rd cycle of chemotherapy on February 22, 2020, therefore we will restage prior to his next cycle  Overall he is tolerating this without difficulty stating he has not had any nausea vomiting diarrhea or constipation  We reviewed his recent labs which are essentially stable  We will continue to monitor his blood work and see him shortly after the CT scan  Patient verbalized understanding and is in agreement with the plan  - CT chest abdomen pelvis w contrast; Future    Goals and Barriers:    Current Goal:   Prolong Survival from Cancer  Barriers: None  Patient's Capacity to Self Care:  Patient is able to self care   -------------------------------------------------------------------------------------------------------    No chief complaint on file  History of present illness/Cancer History:   Oncology History Overview Note   Patient presents today for radiation consult for testicular cancer, referred by Dr Nikko Corona  38year old male with a history of developmental delay,  shunt placed at birth  He was evaluated by his PCP in January 2020 for scrotal swelling  US revealed left testicular mass, he was referred to Urology  1/15/20 US scrotum and testicles  IMPRESSION:   Markedly enlarged and abnormal left testicle with normal vascular flow on Doppler interrogation  The finding raises the suspicion of infiltrating process and possibly testicular malignancy (perhaps testicular lymphoma) especially given the history of slowly progressive testicular swelling    FINDINGS:   TESTES:    RIGHT testis = 4 5 x 1 7 x 2 8 cm   The right testicle demonstrates normal contour with homogeneous smooth echotexture  No right-sided intratesticular mass lesion or calcifications    LEFT testis = 15 1 x 9 7 x 14 0 cm  The left testicle is markedly enlarged with profoundly heterogeneous echotexture      1/17/20 Urology, Dr Zac Rangel for left radical orchiectomy and staging imaging  1/20/20 Left radical orchiectomy     2/3/20 Urology follow-up, Dr Tomasz Mcrae  Reviewed pathology, left testicular seminoma, 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  Scrotal skin was negative for tumor involvement, no tumor was seen at the spermatic cord or the spermatic cord margin, and no penetration through the tunica vaginalis was seen  Plan for CT abd/pelvis and chest xray, recheck lab work  Follow-up to review and referral at that time  2/5/20 Bloodwork:  AFP tumor marker: 5 0  HCG tumor marker: < 1  HCG, Quantitative: < 0 6      2/7/20 CT Abd/Pelvis  IMPRESSION:   1   No evidence of metastatic disease in the abdomen or pelvis    2   Large collection in the left hemiscrotum with central hyperdensity likely representing hematoma  Clinical correlation recommended    3   Postoperative stranding in the left inguinal region with prominent lymph nodes which are likely reactive    4   Cholelithiasis  2/7/20 XR chest pa & lateral  IMPRESSION:   No acute cardiopulmonary disease  2/17/20 Urology f/u with Dr Tomasz Mcrae  CT negative for metastases, HCG and AFP markers are negative  Refer to Radiation Oncology for consideration of hockey-stick adjuvant radiation therapy  Scrotal hematoma mostly resolved, no signs of infection, follow-up in 3 months  2/18/20 Multidisciplinary Urology Case Review  Physician Recommended Plan:  Referral to Radiation Oncology and Medical Oncology        2/21/20 Dr Rachele López  Favor observation  Prophylactic radiation therapy or chemotherapy are reasonable to consider     Reviewed that likelihood of cure is high, observation is reasonable with a goal of avoiding unnecessary treatment  If recurrent, salvage is generally quite effective  Pt and mother prefer avoiding therapy unless necessary  Plan for follow-up in May with repeat imaging and bloodwork to initiate surveillance  5/8/20 CT abd/pelvis and CXR  5/12/20 Dr Vega Bae Urology follow-up  5/15/20 Wesley Caceres follow-up           Testicular seminoma, left [facility])   1/2020 Initial Diagnosis    Testicular seminoma, left ([address] )     1/20/2020 Surgery    Left radical orchiectomy inguinal, patrial scrotectomy (Left)-combination of inguinal and scrotal approach due to size     A  Left testis and spermatic cord:  - Seminoma, 15 5 cm in greatest dimension, with extension beyond tunica albuginea as evidenced by tumor in epididymis and in hilar fat  No penetration through tunica vaginalis is appreciated  - No tumor seen within spermatic cord or at spermatic cord margin  - See synoptic report below     B   Scrotal skin:  - No tumor seen         1/18/2021 -  Chemotherapy    pegfilgrastim (NEULASTA ONPRO) subcutaneous injection kit 6 mg, 6 mg, Subcutaneous, Once, 2 of 4 cycles  Administration: 6 mg (1/29/2021), 6 mg (2/22/2021)  CISplatin (PLATINOL) 50 mg in sodium chloride 0 9 % 500 mL infusion, 48 8 mg, Intravenous, Once, 2 of 4 cycles  Administration: 50 mg (1/18/2021), 50 mg (1/19/2021), 50 mg (2/15/2021), 50 mg (1/20/2021), 50 mg (1/28/2021), 50 mg (1/29/2021), 50 mg (2/16/2021), 50 mg (2/17/2021), 50 mg (2/18/2021), 50 mg (2/22/2021)  fosaprepitant (EMEND) 150 mg in sodium chloride 0 9 % 250 mL IVPB, 150 mg, Intravenous, Once, 2 of 4 cycles  Administration: 150 mg (1/18/2021), 150 mg (2/15/2021), 150 mg (1/28/2021)  etoposide (TOPOSAR) 244 mg in sodium chloride 0 9 % 1,000 mL chemo infusion, 100 mg/m2 = 244 mg, Intravenous, Once, 2 of 4 cycles  Administration: 244 mg (1/18/2021), 244 mg (1/19/2021), 244 mg (2/15/2021), 244 mg (1/20/2021), 244 mg (1/28/2021), 244 mg (1/29/2021), 244 mg (2021), 244 mg (2021), 244 mg (2021), 244 mg (2021)          Cancer Staging  No matching staging information was found for the patient  ECO - Asymptomatic    Interval history:  Clinically stable     Review of Systems   Constitutional: Negative for activity change, appetite change, fatigue, fever and unexpected weight change  Respiratory: Negative for cough and shortness of breath  Cardiovascular: Negative for chest pain and leg swelling  Gastrointestinal: Negative for abdominal pain, constipation, diarrhea and nausea  Endocrine: Negative for cold intolerance and heat intolerance  Musculoskeletal: Negative for arthralgias and myalgias  Skin: Negative  Neurological: Negative for dizziness, weakness and headaches  Hematological: Negative for adenopathy  Does not bruise/bleed easily  Current Outpatient Medications:     meloxicam (MOBIC) 15 mg tablet, Take 1 tablet (15 mg total) by mouth daily, Disp: 90 tablet, Rfl: 3    ondansetron (ZOFRAN) 8 mg tablet, Take 1 tablet (8 mg total) by mouth every 8 (eight) hours as needed for nausea or vomiting, Disp: 20 tablet, Rfl: 2    No Known Allergies    Advance Directive and Living Will:        Objective:   /79   Pulse 90   Temp 98 5 °F (36 9 °C)   Ht 6' 2" (1 88 m)   Wt 120 kg (264 lb)   BMI 33 90 kg/m²   Wt Readings from Last 6 Encounters:   21 120 kg (264 lb)   21 117 kg (257 lb 0 9 oz)   21 119 kg (262 lb 5 6 oz)   21 119 kg (262 lb 5 6 oz)   21 119 kg (262 lb 5 6 oz)   02/15/21 119 kg (262 lb 5 6 oz)       Physical Exam  Vitals signs reviewed  Constitutional:       Appearance: He is well-developed  HENT:      Head: Normocephalic and atraumatic  Eyes:      Pupils: Pupils are equal, round, and reactive to light  Neck:      Musculoskeletal: Normal range of motion  Cardiovascular:      Rate and Rhythm: Normal rate and regular rhythm  Heart sounds: Normal heart sounds  Pulmonary:      Effort: Pulmonary effort is normal  No respiratory distress  Breath sounds: Normal breath sounds  Abdominal:      General: Bowel sounds are normal       Palpations: Abdomen is soft  Musculoskeletal: Normal range of motion  Lymphadenopathy:      Cervical: No cervical adenopathy  Skin:     General: Skin is warm and dry  Capillary Refill: Capillary refill takes less than 2 seconds  Neurological:      Mental Status: He is alert and oriented to person, place, and time  Psychiatric:         Behavior: Behavior normal          The following historical data was reviewed      Past Medical History:   Diagnosis Date    Cleft hard palate     Mass of left testicle     Seizures ([address] )     Testicular mass 1/16/2020    Added automatically from request for surgery [id number]       Past Surgical History:   Procedure Laterality Date    CLEFT LIP REPAIR      IR BIOPSY RETROPERITONEUM  12/22/2020    ORCHIECTOMY Left     [place] REMOVAL TESTIS,RADICAL Left 1/20/2020    Procedure: LEFT RADICAL ORCHIECTOMY INGUINAL, patrial scrotectomy;  Surgeon: Keith Ward MD;  Location: [facility];  Service: Urology       Social History     Socioeconomic History    Marital status: Single     Spouse name: None    Number of children: None    Years of education: None    Highest education level: None   Occupational History    Occupation: disabled   Social Needs    Financial resource strain: None    Food insecurity     Worry: None     Inability: None    Transportation needs     Medical: None     Non-medical: None   Tobacco Use    Smoking status: Current Every Day Smoker     Packs/day: 0 50     Types: Cigarettes    Smokeless tobacco: Former User   Substance and Sexual Activity    Alcohol use: Yes     Comment: rare    Drug use: Never    Sexual activity: Not Currently   Lifestyle    Physical activity     Days per week: None     Minutes per session: None    Stress: None   Relationships    Social connections Talks on phone: None     Gets together: None     Attends [employer] service: None     Active member of club or organization: None     Attends meetings of clubs or organizations: None     Relationship status: None    Intimate partner violence     Fear of current or ex partner: None     Emotionally abused: None     Physically abused: None     Forced sexual activity: None   Other Topics Concern    None   Social History Narrative    Disabled    Single    No children    Lives with his mother  Family History   Problem Relation Age of Onset    Hypertension Mother     COPD Mother     Seizures Father     Heart disease Father     Cancer Family        Please note: This report has been generated by a voice recognition software system  Therefore there may be syntax, spelling, and/or grammatical errors  Please call if you have any questions

## 2021-03-02 DIAGNOSIS — C62.92 TESTICULAR SEMINOMA, LEFT (HCC): Primary | ICD-10-CM

## 2021-03-02 RX ORDER — SODIUM CHLORIDE 9 MG/ML
20 INJECTION, SOLUTION INTRAVENOUS ONCE
Status: CANCELLED | OUTPATIENT
Start: 2021-03-09

## 2021-03-02 RX ORDER — SODIUM CHLORIDE 9 MG/ML
20 INJECTION, SOLUTION INTRAVENOUS ONCE
Status: CANCELLED | OUTPATIENT
Start: 2021-03-11

## 2021-03-02 RX ORDER — SODIUM CHLORIDE 9 MG/ML
20 INJECTION, SOLUTION INTRAVENOUS ONCE
Status: CANCELLED | OUTPATIENT
Start: 2021-03-08

## 2021-03-02 RX ORDER — SODIUM CHLORIDE 9 MG/ML
20 INJECTION, SOLUTION INTRAVENOUS ONCE
Status: CANCELLED | OUTPATIENT
Start: 2021-03-12

## 2021-03-02 RX ORDER — SODIUM CHLORIDE 9 MG/ML
20 INJECTION, SOLUTION INTRAVENOUS ONCE
Status: CANCELLED | OUTPATIENT
Start: 2021-03-10

## 2021-03-04 ENCOUNTER — HOSPITAL ENCOUNTER (OUTPATIENT)
Dept: CT IMAGING | Facility: HOSPITAL | Age: 46
Discharge: HOME | End: 2021-03-04
Payer: MEDICARE

## 2021-03-04 DIAGNOSIS — C62.92 TESTICULAR SEMINOMA, LEFT (HCC): ICD-10-CM

## 2021-03-04 PROCEDURE — G1004 CDSM NDSC: HCPCS

## 2021-03-04 PROCEDURE — 71260 CT THORAX DX C+: CPT

## 2021-03-04 PROCEDURE — 74177 CT ABD & PELVIS W/CONTRAST: CPT

## 2021-03-04 RX ADMIN — IOHEXOL 100 ML: 350 INJECTION, SOLUTION INTRAVENOUS at 11:43

## 2021-03-05 ENCOUNTER — APPOINTMENT (OUTPATIENT)
Dept: LAB | Facility: HOSPITAL | Age: 46
End: 2021-03-05
Attending: INTERNAL MEDICINE
Payer: MEDICARE

## 2021-03-05 DIAGNOSIS — T45.1X5A CHEMOTHERAPY INDUCED NAUSEA AND VOMITING: ICD-10-CM

## 2021-03-05 DIAGNOSIS — R11.2 CHEMOTHERAPY INDUCED NAUSEA AND VOMITING: ICD-10-CM

## 2021-03-05 DIAGNOSIS — Z76.89 PREVENTION OF CHEMOTHERAPY-INDUCED NEUTROPENIA: ICD-10-CM

## 2021-03-05 DIAGNOSIS — C62.92 TESTICULAR SEMINOMA, LEFT (HCC): ICD-10-CM

## 2021-03-05 LAB
ALBUMIN SERPL BCP-MCNC: 3.5 G/DL (ref 3.5–5)
ALP SERPL-CCNC: 90 U/L (ref 46–116)
ALT SERPL W P-5'-P-CCNC: 34 U/L (ref 12–78)
ANION GAP SERPL CALCULATED.3IONS-SCNC: 10 MMOL/L (ref 4–13)
AST SERPL W P-5'-P-CCNC: 18 U/L (ref 5–45)
BASOPHILS # BLD AUTO: 0.03 THOUSANDS/ΜL (ref 0–0.1)
BASOPHILS NFR BLD AUTO: 1 % (ref 0–1)
BILIRUB SERPL-MCNC: 0.4 MG/DL (ref 0.2–1)
BUN SERPL-MCNC: 9 MG/DL (ref 5–25)
CALCIUM SERPL-MCNC: 9.3 MG/DL (ref 8.3–10.1)
CHLORIDE SERPL-SCNC: 104 MMOL/L (ref 100–108)
CO2 SERPL-SCNC: 25 MMOL/L (ref 21–32)
CREAT SERPL-MCNC: 1.03 MG/DL (ref 0.6–1.3)
EOSINOPHIL # BLD AUTO: 0.02 THOUSAND/ΜL (ref 0–0.61)
EOSINOPHIL NFR BLD AUTO: 1 % (ref 0–6)
ERYTHROCYTE [DISTWIDTH] IN BLOOD BY AUTOMATED COUNT: 16.5 % (ref 11.6–15.1)
GFR SERPL CREATININE-BSD FRML MDRD: 88 ML/MIN/1.73SQ M
GLUCOSE P FAST SERPL-MCNC: 95 MG/DL (ref 65–99)
HCT VFR BLD AUTO: 34.2 % (ref 36.5–49.3)
HGB BLD-MCNC: 11.4 G/DL (ref 12–17)
IMM GRANULOCYTES # BLD AUTO: 0 THOUSAND/UL (ref 0–0.2)
IMM GRANULOCYTES NFR BLD AUTO: 0 % (ref 0–2)
LYMPHOCYTES # BLD AUTO: 1.45 THOUSANDS/ΜL (ref 0.6–4.47)
LYMPHOCYTES NFR BLD AUTO: 61 % (ref 14–44)
MCH RBC QN AUTO: 30.7 PG (ref 26.8–34.3)
MCHC RBC AUTO-ENTMCNC: 33.3 G/DL (ref 31.4–37.4)
MCV RBC AUTO: 92 FL (ref 82–98)
MONOCYTES # BLD AUTO: 0.43 THOUSAND/ΜL (ref 0.17–1.22)
MONOCYTES NFR BLD AUTO: 18 % (ref 4–12)
NEUTROPHILS # BLD AUTO: 0.44 THOUSANDS/ΜL (ref 1.85–7.62)
NEUTS SEG NFR BLD AUTO: 19 % (ref 43–75)
NRBC BLD AUTO-RTO: 0 /100 WBCS
PLATELET # BLD AUTO: 211 THOUSANDS/UL (ref 149–390)
PMV BLD AUTO: 9 FL (ref 8.9–12.7)
POTASSIUM SERPL-SCNC: 3.7 MMOL/L (ref 3.5–5.3)
PROT SERPL-MCNC: 7.2 G/DL (ref 6.4–8.2)
RBC # BLD AUTO: 3.71 MILLION/UL (ref 3.88–5.62)
SODIUM SERPL-SCNC: 139 MMOL/L (ref 136–145)
WBC # BLD AUTO: 2.37 THOUSAND/UL (ref 4.31–10.16)

## 2021-03-05 PROCEDURE — 85025 COMPLETE CBC W/AUTO DIFF WBC: CPT

## 2021-03-05 PROCEDURE — 36415 COLL VENOUS BLD VENIPUNCTURE: CPT

## 2021-03-05 PROCEDURE — 80053 COMPREHEN METABOLIC PANEL: CPT

## 2021-03-08 ENCOUNTER — HOSPITAL ENCOUNTER (OUTPATIENT)
Dept: INFUSION CENTER | Facility: HOSPITAL | Age: 46
Discharge: HOME | End: 2021-03-08
Attending: INTERNAL MEDICINE
Payer: MEDICARE

## 2021-03-08 VITALS
HEIGHT: 74 IN | SYSTOLIC BLOOD PRESSURE: 119 MMHG | WEIGHT: 261.02 LBS | BODY MASS INDEX: 33.5 KG/M2 | DIASTOLIC BLOOD PRESSURE: 77 MMHG | RESPIRATION RATE: 18 BRPM | OXYGEN SATURATION: 97 % | HEART RATE: 76 BPM | TEMPERATURE: 98.1 F

## 2021-03-08 DIAGNOSIS — C62.92 TESTICULAR SEMINOMA, LEFT (HCC): ICD-10-CM

## 2021-03-08 DIAGNOSIS — R11.2 CHEMOTHERAPY INDUCED NAUSEA AND VOMITING: Primary | ICD-10-CM

## 2021-03-08 DIAGNOSIS — T45.1X5A CHEMOTHERAPY INDUCED NAUSEA AND VOMITING: Primary | ICD-10-CM

## 2021-03-08 DIAGNOSIS — Z76.89 PREVENTION OF CHEMOTHERAPY-INDUCED NEUTROPENIA: ICD-10-CM

## 2021-03-08 LAB
ALBUMIN SERPL BCP-MCNC: 4.3 G/DL (ref 3.5–5.7)
ALP SERPL-CCNC: 70 U/L (ref 40–150)
ALT SERPL W P-5'-P-CCNC: 24 U/L (ref 7–52)
ANION GAP SERPL CALCULATED.3IONS-SCNC: 10 MMOL/L (ref 4–13)
AST SERPL W P-5'-P-CCNC: 16 U/L (ref 13–39)
BASOPHILS # BLD AUTO: 0 THOUSANDS/ΜL (ref 0–0.1)
BASOPHILS NFR BLD AUTO: 1 % (ref 0–2)
BILIRUB SERPL-MCNC: 0.5 MG/DL (ref 0.2–1)
BUN SERPL-MCNC: 12 MG/DL (ref 7–25)
CALCIUM SERPL-MCNC: 9.4 MG/DL (ref 8.6–10.5)
CHLORIDE SERPL-SCNC: 103 MMOL/L (ref 98–107)
CO2 SERPL-SCNC: 27 MMOL/L (ref 21–31)
CREAT SERPL-MCNC: 0.99 MG/DL (ref 0.7–1.3)
EOSINOPHIL # BLD AUTO: 0 THOUSAND/ΜL (ref 0–0.61)
EOSINOPHIL NFR BLD AUTO: 1 % (ref 0–5)
ERYTHROCYTE [DISTWIDTH] IN BLOOD BY AUTOMATED COUNT: 16.6 % (ref 11.5–14.5)
GFR SERPL CREATININE-BSD FRML MDRD: 92 ML/MIN/1.73SQ M
GLUCOSE SERPL-MCNC: 114 MG/DL (ref 65–99)
HCT VFR BLD AUTO: 36 % (ref 42–47)
HGB BLD-MCNC: 12.5 G/DL (ref 14–18)
LYMPHOCYTES # BLD AUTO: 2 THOUSANDS/ΜL (ref 0.6–4.47)
LYMPHOCYTES NFR BLD AUTO: 51 % (ref 21–51)
MCH RBC QN AUTO: 31.4 PG (ref 26–34)
MCHC RBC AUTO-ENTMCNC: 34.7 G/DL (ref 31–37)
MCV RBC AUTO: 91 FL (ref 81–99)
MONOCYTES # BLD AUTO: 0.7 THOUSAND/ΜL (ref 0.17–1.22)
MONOCYTES NFR BLD AUTO: 16 % (ref 2–12)
NEUTROPHILS # BLD AUTO: 1.2 THOUSANDS/ΜL (ref 1.4–6.5)
NEUTS SEG NFR BLD AUTO: 31 % (ref 42–75)
PLATELET # BLD AUTO: 279 THOUSANDS/UL (ref 149–390)
PMV BLD AUTO: 7.3 FL (ref 8.6–11.7)
POTASSIUM SERPL-SCNC: 3.7 MMOL/L (ref 3.5–5.5)
PROT SERPL-MCNC: 7.1 G/DL (ref 6.4–8.9)
RBC # BLD AUTO: 3.98 MILLION/UL (ref 4.3–5.9)
SODIUM SERPL-SCNC: 140 MMOL/L (ref 134–143)
WBC # BLD AUTO: 4 THOUSAND/UL (ref 4.8–10.8)

## 2021-03-08 PROCEDURE — 85025 COMPLETE CBC W/AUTO DIFF WBC: CPT | Performed by: INTERNAL MEDICINE

## 2021-03-08 PROCEDURE — 80053 COMPREHEN METABOLIC PANEL: CPT | Performed by: INTERNAL MEDICINE

## 2021-03-08 PROCEDURE — 96367 TX/PROPH/DG ADDL SEQ IV INF: CPT

## 2021-03-08 PROCEDURE — 96361 HYDRATE IV INFUSION ADD-ON: CPT

## 2021-03-08 PROCEDURE — 96413 CHEMO IV INFUSION 1 HR: CPT

## 2021-03-08 PROCEDURE — 96417 CHEMO IV INFUS EACH ADDL SEQ: CPT

## 2021-03-08 RX ORDER — SODIUM CHLORIDE 9 MG/ML
20 INJECTION, SOLUTION INTRAVENOUS ONCE
Status: COMPLETED | OUTPATIENT
Start: 2021-03-08 | End: 2021-03-08

## 2021-03-08 RX ADMIN — ETOPOSIDE 244 MG: 20 INJECTION INTRAVENOUS at 12:47

## 2021-03-08 RX ADMIN — SODIUM CHLORIDE 500 ML: 0.9 INJECTION, SOLUTION INTRAVENOUS at 14:08

## 2021-03-08 RX ADMIN — DEXAMETHASONE SODIUM PHOSPHATE: 10 INJECTION, SOLUTION INTRAMUSCULAR; INTRAVENOUS at 10:19

## 2021-03-08 RX ADMIN — SODIUM CHLORIDE 20 ML/HR: 0.9 INJECTION, SOLUTION INTRAVENOUS at 10:15

## 2021-03-08 RX ADMIN — CISPLATIN 50 MG: 1 INJECTION INTRAVENOUS at 11:28

## 2021-03-08 RX ADMIN — SODIUM CHLORIDE 500 ML: 0.9 INJECTION, SOLUTION INTRAVENOUS at 09:11

## 2021-03-08 RX ADMIN — FOSAPREPITANT 150 MG: 150 INJECTION, POWDER, LYOPHILIZED, FOR SOLUTION INTRAVENOUS at 10:56

## 2021-03-08 NOTE — PLAN OF CARE
Problem: Potential for Falls  Goal: Patient will remain free of falls  Description: INTERVENTIONS:  - Assess patient frequently for physical needs  -  Identify cognitive and physical deficits and behaviors that affect risk of falls    -  [place] fall precautions as indicated by assessment   - Educate patient/family on patient safety including physical limitations  - Instruct patient to call for assistance with activity based on assessment  - Modify environment to reduce risk of injury  - Consider OT/PT consult to assist with strengthening/mobility  Outcome: Progressing

## 2021-03-08 NOTE — PROGRESS NOTES
Pt tolerated treatment today without adverse reaction  Aware of return appointment tomorrow   AVS given

## 2021-03-09 ENCOUNTER — PATIENT OUTREACH (OUTPATIENT)
Dept: CASE MANAGEMENT | Facility: HOSPITAL | Age: 46
End: 2021-03-09

## 2021-03-09 ENCOUNTER — HOSPITAL ENCOUNTER (OUTPATIENT)
Dept: INFUSION CENTER | Facility: HOSPITAL | Age: 46
Discharge: HOME | End: 2021-03-09
Attending: INTERNAL MEDICINE
Payer: MEDICARE

## 2021-03-09 VITALS
DIASTOLIC BLOOD PRESSURE: 80 MMHG | RESPIRATION RATE: 16 BRPM | TEMPERATURE: 97.9 F | SYSTOLIC BLOOD PRESSURE: 138 MMHG | HEIGHT: 74 IN | HEART RATE: 76 BPM | BODY MASS INDEX: 33.5 KG/M2 | WEIGHT: 261.02 LBS

## 2021-03-09 DIAGNOSIS — Z76.89 PREVENTION OF CHEMOTHERAPY-INDUCED NEUTROPENIA: ICD-10-CM

## 2021-03-09 DIAGNOSIS — C62.92 TESTICULAR SEMINOMA, LEFT (HCC): ICD-10-CM

## 2021-03-09 DIAGNOSIS — T45.1X5A CHEMOTHERAPY INDUCED NAUSEA AND VOMITING: Primary | ICD-10-CM

## 2021-03-09 DIAGNOSIS — R11.2 CHEMOTHERAPY INDUCED NAUSEA AND VOMITING: Primary | ICD-10-CM

## 2021-03-09 PROCEDURE — 96361 HYDRATE IV INFUSION ADD-ON: CPT

## 2021-03-09 PROCEDURE — 96367 TX/PROPH/DG ADDL SEQ IV INF: CPT

## 2021-03-09 PROCEDURE — 96417 CHEMO IV INFUS EACH ADDL SEQ: CPT

## 2021-03-09 PROCEDURE — 96413 CHEMO IV INFUSION 1 HR: CPT

## 2021-03-09 RX ORDER — SODIUM CHLORIDE 9 MG/ML
20 INJECTION, SOLUTION INTRAVENOUS ONCE
Status: COMPLETED | OUTPATIENT
Start: 2021-03-09 | End: 2021-03-09

## 2021-03-09 RX ADMIN — DEXAMETHASONE SODIUM PHOSPHATE: 10 INJECTION, SOLUTION INTRAMUSCULAR; INTRAVENOUS at 10:00

## 2021-03-09 RX ADMIN — SODIUM CHLORIDE 500 ML: 0.9 INJECTION, SOLUTION INTRAVENOUS at 09:38

## 2021-03-09 RX ADMIN — SODIUM CHLORIDE 500 ML: 0.9 INJECTION, SOLUTION INTRAVENOUS at 13:05

## 2021-03-09 RX ADMIN — CISPLATIN 50 MG: 1 INJECTION INTRAVENOUS at 10:53

## 2021-03-09 RX ADMIN — ETOPOSIDE 244 MG: 20 INJECTION INTRAVENOUS at 12:01

## 2021-03-09 RX ADMIN — SODIUM CHLORIDE 20 ML/HR: 0.9 INJECTION, SOLUTION INTRAVENOUS at 09:58

## 2021-03-09 NOTE — PROGRESS NOTES
Oncology LSW met with PT chairside to provide him with emotional support  PT reported that he was doing well  LSW proceeded to engage in light conversation, discussing PT's favorite TV shows and [employer]  LSW will continue to follow up with PT chairside, as he is receptive to LSW's conversation/interaction

## 2021-03-09 NOTE — PLAN OF CARE
Problem: Potential for Falls  Goal: Patient will remain free of falls  Description: INTERVENTIONS:  - Assess patient frequently for physical needs  -  Identify cognitive and physical deficits and behaviors that affect risk of falls    -  [place] fall precautions as indicated by assessment   - Educate patient/family on patient safety including physical limitations  - Instruct patient to call for assistance with activity based on assessment  - Modify environment to reduce risk of injury  - Consider OT/PT consult to assist with strengthening/mobility  Outcome: Progressing

## 2021-03-10 ENCOUNTER — HOSPITAL ENCOUNTER (OUTPATIENT)
Dept: INFUSION CENTER | Facility: HOSPITAL | Age: 46
Discharge: HOME | End: 2021-03-10
Attending: INTERNAL MEDICINE
Payer: MEDICARE

## 2021-03-10 VITALS
BODY MASS INDEX: 33.5 KG/M2 | HEIGHT: 74 IN | DIASTOLIC BLOOD PRESSURE: 74 MMHG | RESPIRATION RATE: 16 BRPM | SYSTOLIC BLOOD PRESSURE: 137 MMHG | HEART RATE: 63 BPM | TEMPERATURE: 98.6 F | WEIGHT: 261.02 LBS

## 2021-03-10 DIAGNOSIS — Z76.89 PREVENTION OF CHEMOTHERAPY-INDUCED NEUTROPENIA: ICD-10-CM

## 2021-03-10 DIAGNOSIS — T45.1X5A CHEMOTHERAPY INDUCED NAUSEA AND VOMITING: Primary | ICD-10-CM

## 2021-03-10 DIAGNOSIS — R11.2 CHEMOTHERAPY INDUCED NAUSEA AND VOMITING: Primary | ICD-10-CM

## 2021-03-10 DIAGNOSIS — C62.92 TESTICULAR SEMINOMA, LEFT (HCC): ICD-10-CM

## 2021-03-10 PROCEDURE — 96361 HYDRATE IV INFUSION ADD-ON: CPT

## 2021-03-10 PROCEDURE — 96417 CHEMO IV INFUS EACH ADDL SEQ: CPT

## 2021-03-10 PROCEDURE — 96367 TX/PROPH/DG ADDL SEQ IV INF: CPT

## 2021-03-10 PROCEDURE — 96413 CHEMO IV INFUSION 1 HR: CPT

## 2021-03-10 RX ORDER — SODIUM CHLORIDE 9 MG/ML
20 INJECTION, SOLUTION INTRAVENOUS ONCE
Status: COMPLETED | OUTPATIENT
Start: 2021-03-10 | End: 2021-03-10

## 2021-03-10 RX ADMIN — CISPLATIN 50 MG: 1 INJECTION INTRAVENOUS at 10:11

## 2021-03-10 RX ADMIN — ETOPOSIDE 244 MG: 20 INJECTION INTRAVENOUS at 11:14

## 2021-03-10 RX ADMIN — SODIUM CHLORIDE 500 ML: 0.9 INJECTION, SOLUTION INTRAVENOUS at 08:45

## 2021-03-10 RX ADMIN — DEXAMETHASONE SODIUM PHOSPHATE: 10 INJECTION, SOLUTION INTRAMUSCULAR; INTRAVENOUS at 09:49

## 2021-03-10 RX ADMIN — SODIUM CHLORIDE 20 ML/HR: 0.9 INJECTION, SOLUTION INTRAVENOUS at 08:43

## 2021-03-10 RX ADMIN — SODIUM CHLORIDE 500 ML: 0.9 INJECTION, SOLUTION INTRAVENOUS at 12:20

## 2021-03-10 NOTE — PROGRESS NOTES
Pt tolerated todays cisplatin/etoposide well  No adverse reactions noted  Hydrated well before and after chemo  Peripheral iv discontinued jelco intact   Discharged ambulatory deferring avs

## 2021-03-11 ENCOUNTER — HOSPITAL ENCOUNTER (OUTPATIENT)
Dept: INFUSION CENTER | Facility: HOSPITAL | Age: 46
Discharge: HOME | End: 2021-03-11
Attending: INTERNAL MEDICINE
Payer: MEDICARE

## 2021-03-11 VITALS
SYSTOLIC BLOOD PRESSURE: 159 MMHG | RESPIRATION RATE: 18 BRPM | HEART RATE: 70 BPM | WEIGHT: 261.02 LBS | HEIGHT: 74 IN | DIASTOLIC BLOOD PRESSURE: 93 MMHG | TEMPERATURE: 97.2 F | BODY MASS INDEX: 33.5 KG/M2

## 2021-03-11 DIAGNOSIS — C62.92 TESTICULAR SEMINOMA, LEFT (HCC): ICD-10-CM

## 2021-03-11 DIAGNOSIS — Z76.89 PREVENTION OF CHEMOTHERAPY-INDUCED NEUTROPENIA: ICD-10-CM

## 2021-03-11 DIAGNOSIS — T45.1X5A CHEMOTHERAPY INDUCED NAUSEA AND VOMITING: Primary | ICD-10-CM

## 2021-03-11 DIAGNOSIS — R11.2 CHEMOTHERAPY INDUCED NAUSEA AND VOMITING: Primary | ICD-10-CM

## 2021-03-11 PROCEDURE — 96417 CHEMO IV INFUS EACH ADDL SEQ: CPT

## 2021-03-11 PROCEDURE — 96367 TX/PROPH/DG ADDL SEQ IV INF: CPT

## 2021-03-11 PROCEDURE — 96413 CHEMO IV INFUSION 1 HR: CPT

## 2021-03-11 PROCEDURE — 96361 HYDRATE IV INFUSION ADD-ON: CPT

## 2021-03-11 RX ORDER — SODIUM CHLORIDE 9 MG/ML
20 INJECTION, SOLUTION INTRAVENOUS ONCE
Status: COMPLETED | OUTPATIENT
Start: 2021-03-11 | End: 2021-03-11

## 2021-03-11 RX ADMIN — SODIUM CHLORIDE 500 ML: 0.9 INJECTION, SOLUTION INTRAVENOUS at 09:39

## 2021-03-11 RX ADMIN — ETOPOSIDE 244 MG: 20 INJECTION INTRAVENOUS at 11:54

## 2021-03-11 RX ADMIN — SODIUM CHLORIDE 500 ML: 0.9 INJECTION, SOLUTION INTRAVENOUS at 13:08

## 2021-03-11 RX ADMIN — DEXAMETHASONE SODIUM PHOSPHATE: 10 INJECTION, SOLUTION INTRAMUSCULAR; INTRAVENOUS at 10:01

## 2021-03-11 RX ADMIN — SODIUM CHLORIDE 20 ML/HR: 0.9 INJECTION, SOLUTION INTRAVENOUS at 10:01

## 2021-03-11 RX ADMIN — CISPLATIN 50 MG: 1 INJECTION INTRAVENOUS at 10:35

## 2021-03-11 NOTE — PLAN OF CARE
Problem: Potential for Falls  Goal: Patient will remain free of falls  Description: INTERVENTIONS:  - Assess patient frequently for physical needs  -  Identify cognitive and physical deficits and behaviors that affect risk of falls    -  [place] fall precautions as indicated by assessment   - Educate patient/family on patient safety including physical limitations  - Instruct patient to call for assistance with activity based on assessment  - Modify environment to reduce risk of injury  - Consider OT/PT consult to assist with strengthening/mobility  Outcome: Progressing

## 2021-03-11 NOTE — PROGRESS NOTES
Chemo given without incident  Pre and post hydration given as ordered  Pt returning tomorrow, he is aware of his appt  AVS declined

## 2021-03-12 ENCOUNTER — HOSPITAL ENCOUNTER (OUTPATIENT)
Dept: INFUSION CENTER | Facility: HOSPITAL | Age: 46
Discharge: HOME | End: 2021-03-12
Attending: INTERNAL MEDICINE
Payer: MEDICARE

## 2021-03-12 ENCOUNTER — APPOINTMENT (OUTPATIENT)
Dept: LAB | Facility: HOSPITAL | Age: 46
End: 2021-03-12
Attending: INTERNAL MEDICINE
Payer: MEDICARE

## 2021-03-12 VITALS — BODY MASS INDEX: 33.5 KG/M2 | WEIGHT: 261.02 LBS | HEIGHT: 74 IN

## 2021-03-12 DIAGNOSIS — T45.1X5A CHEMOTHERAPY INDUCED NAUSEA AND VOMITING: Primary | ICD-10-CM

## 2021-03-12 DIAGNOSIS — Z76.89 PREVENTION OF CHEMOTHERAPY-INDUCED NEUTROPENIA: ICD-10-CM

## 2021-03-12 DIAGNOSIS — R11.2 CHEMOTHERAPY INDUCED NAUSEA AND VOMITING: Primary | ICD-10-CM

## 2021-03-12 DIAGNOSIS — C62.92 TESTICULAR SEMINOMA, LEFT (HCC): ICD-10-CM

## 2021-03-12 LAB
ALBUMIN SERPL BCP-MCNC: 3.6 G/DL (ref 3.5–5)
ALP SERPL-CCNC: 77 U/L (ref 46–116)
ALT SERPL W P-5'-P-CCNC: 36 U/L (ref 12–78)
ANION GAP SERPL CALCULATED.3IONS-SCNC: 8 MMOL/L (ref 4–13)
AST SERPL W P-5'-P-CCNC: 13 U/L (ref 5–45)
BASOPHILS # BLD AUTO: 0 THOUSANDS/ΜL (ref 0–0.1)
BASOPHILS NFR BLD AUTO: 0 % (ref 0–1)
BILIRUB SERPL-MCNC: 0.8 MG/DL (ref 0.2–1)
BUN SERPL-MCNC: 20 MG/DL (ref 5–25)
CALCIUM SERPL-MCNC: 8.6 MG/DL (ref 8.3–10.1)
CHLORIDE SERPL-SCNC: 98 MMOL/L (ref 100–108)
CO2 SERPL-SCNC: 25 MMOL/L (ref 21–32)
CREAT SERPL-MCNC: 1.05 MG/DL (ref 0.6–1.3)
EOSINOPHIL # BLD AUTO: 0 THOUSAND/ΜL (ref 0–0.61)
EOSINOPHIL NFR BLD AUTO: 0 % (ref 0–6)
ERYTHROCYTE [DISTWIDTH] IN BLOOD BY AUTOMATED COUNT: 16.3 % (ref 11.6–15.1)
GFR SERPL CREATININE-BSD FRML MDRD: 86 ML/MIN/1.73SQ M
GLUCOSE SERPL-MCNC: 190 MG/DL (ref 65–140)
HCT VFR BLD AUTO: 36.5 % (ref 36.5–49.3)
HGB BLD-MCNC: 12.4 G/DL (ref 12–17)
IMM GRANULOCYTES # BLD AUTO: 0.01 THOUSAND/UL (ref 0–0.2)
IMM GRANULOCYTES NFR BLD AUTO: 0 % (ref 0–2)
LYMPHOCYTES # BLD AUTO: 0.69 THOUSANDS/ΜL (ref 0.6–4.47)
LYMPHOCYTES NFR BLD AUTO: 16 % (ref 14–44)
MAGNESIUM SERPL-MCNC: 1.3 MG/DL (ref 1.6–2.6)
MCH RBC QN AUTO: 31 PG (ref 26.8–34.3)
MCHC RBC AUTO-ENTMCNC: 34 G/DL (ref 31.4–37.4)
MCV RBC AUTO: 91 FL (ref 82–98)
MONOCYTES # BLD AUTO: 0.02 THOUSAND/ΜL (ref 0.17–1.22)
MONOCYTES NFR BLD AUTO: 1 % (ref 4–12)
NEUTROPHILS # BLD AUTO: 3.69 THOUSANDS/ΜL (ref 1.85–7.62)
NEUTS SEG NFR BLD AUTO: 83 % (ref 43–75)
NRBC BLD AUTO-RTO: 0 /100 WBCS
PLATELET # BLD AUTO: 361 THOUSANDS/UL (ref 149–390)
PMV BLD AUTO: 9 FL (ref 8.9–12.7)
POTASSIUM SERPL-SCNC: 4.2 MMOL/L (ref 3.5–5.3)
PROT SERPL-MCNC: 7.3 G/DL (ref 6.4–8.2)
RBC # BLD AUTO: 4 MILLION/UL (ref 3.88–5.62)
SODIUM SERPL-SCNC: 131 MMOL/L (ref 136–145)
WBC # BLD AUTO: 4.41 THOUSAND/UL (ref 4.31–10.16)

## 2021-03-12 PROCEDURE — 96377 APPLICATON ON-BODY INJECTOR: CPT

## 2021-03-12 PROCEDURE — 80053 COMPREHEN METABOLIC PANEL: CPT

## 2021-03-12 PROCEDURE — 96417 CHEMO IV INFUS EACH ADDL SEQ: CPT

## 2021-03-12 PROCEDURE — 96413 CHEMO IV INFUSION 1 HR: CPT

## 2021-03-12 PROCEDURE — 85025 COMPLETE CBC W/AUTO DIFF WBC: CPT

## 2021-03-12 PROCEDURE — 83735 ASSAY OF MAGNESIUM: CPT

## 2021-03-12 PROCEDURE — 96361 HYDRATE IV INFUSION ADD-ON: CPT

## 2021-03-12 PROCEDURE — 36415 COLL VENOUS BLD VENIPUNCTURE: CPT

## 2021-03-12 PROCEDURE — 96367 TX/PROPH/DG ADDL SEQ IV INF: CPT

## 2021-03-12 RX ORDER — SODIUM CHLORIDE 9 MG/ML
20 INJECTION, SOLUTION INTRAVENOUS ONCE
Status: COMPLETED | OUTPATIENT
Start: 2021-03-12 | End: 2021-03-12

## 2021-03-12 RX ADMIN — CISPLATIN 50 MG: 1 INJECTION INTRAVENOUS at 10:48

## 2021-03-12 RX ADMIN — SODIUM CHLORIDE 500 ML: 0.9 INJECTION, SOLUTION INTRAVENOUS at 09:12

## 2021-03-12 RX ADMIN — DEXAMETHASONE SODIUM PHOSPHATE: 10 INJECTION, SOLUTION INTRAMUSCULAR; INTRAVENOUS at 10:17

## 2021-03-12 RX ADMIN — SODIUM CHLORIDE 20 ML/HR: 0.9 INJECTION, SOLUTION INTRAVENOUS at 09:09

## 2021-03-12 RX ADMIN — ETOPOSIDE 244 MG: 20 INJECTION INTRAVENOUS at 11:52

## 2021-03-12 RX ADMIN — PEGFILGRASTIM 6 MG: KIT SUBCUTANEOUS at 14:16

## 2021-03-12 RX ADMIN — SODIUM CHLORIDE 500 ML: 0.9 INJECTION, SOLUTION INTRAVENOUS at 13:04

## 2021-03-12 NOTE — PROGRESS NOTES
Pt tolerated todays cisplatin and etoposide well  Hydrated before and after  Voiding in br frequently  Peripheral iv discontinued jelco intact   neulasta on pro applied to pts rt arm at his request  Discharged ambulatory with avs

## 2021-03-15 DIAGNOSIS — T45.1X5A CHEMOTHERAPY INDUCED NAUSEA AND VOMITING: Primary | ICD-10-CM

## 2021-03-15 DIAGNOSIS — R11.2 CHEMOTHERAPY INDUCED NAUSEA AND VOMITING: Primary | ICD-10-CM

## 2021-03-15 DIAGNOSIS — Z76.89 PREVENTION OF CHEMOTHERAPY-INDUCED NEUTROPENIA: ICD-10-CM

## 2021-03-15 DIAGNOSIS — C62.92 TESTICULAR SEMINOMA, LEFT (HCC): ICD-10-CM

## 2021-03-16 ENCOUNTER — OFFICE VISIT (OUTPATIENT)
Dept: HEMATOLOGY ONCOLOGY | Facility: CLINIC | Age: 46
End: 2021-03-16
Payer: MEDICARE

## 2021-03-16 VITALS
HEIGHT: 74 IN | TEMPERATURE: 98.5 F | DIASTOLIC BLOOD PRESSURE: 74 MMHG | WEIGHT: 268 LBS | HEART RATE: 100 BPM | SYSTOLIC BLOOD PRESSURE: 132 MMHG | OXYGEN SATURATION: 97 % | RESPIRATION RATE: 18 BRPM | BODY MASS INDEX: 34.39 KG/M2

## 2021-03-16 DIAGNOSIS — C62.92 TESTICULAR SEMINOMA, LEFT (HCC): Primary | ICD-10-CM

## 2021-03-16 PROCEDURE — 99214 OFFICE O/P EST MOD 30 MIN: CPT | Performed by: INTERNAL MEDICINE

## 2021-03-16 NOTE — PROGRESS NOTES
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  He has had 3 cycles of etoposide/cisplatin  He tolerates this relatively well  He does have mild nausea and diarrhea  This resolves within a few days  CBC is normal   CMP shows mild hyponatremia, possibly related  Mild hypomagnesemia, 1 3  Observation is favored  These will be repeated prior to his 4th and final cycle of chemotherapy  Supplementation accordingly  CT chest abdomen pelvis shows resolution of previous retroperitoneal lymphadenopathy  6 mm right upper lobe pulmonary nodule stable compared to the prior study of November 2020  None are available prior to that time  I suspect this is not related to his cancer  Tumor markers had been normal at the time of his relapse  We will proceed with his 4th cycle of chemotherapy  Follow-up in 3 months  I discussed the above with the patient  The patient and his brother voiced understanding and agreement   ______________________________________________________________________    Chief Complaint   Patient presents with    Follow-up       HPI:  Oncology History Overview Note   Patient presents today for radiation consult for testicular cancer, referred by Dr Dara Husain  38year old male with a history of developmental delay,  shunt placed at birth  He was evaluated by his PCP in January 2020 for scrotal swelling  US revealed left testicular mass, he was referred to Urology  1/15/20 US scrotum and testicles  IMPRESSION:   Markedly enlarged and abnormal left testicle with normal vascular flow on Doppler interrogation    The finding raises the suspicion of infiltrating process and possibly testicular malignancy (perhaps testicular lymphoma) especially given the history of slowly progressive testicular swelling  FINDINGS:   TESTES:    RIGHT testis = 4 5 x 1 7 x 2 8 cm   The right testicle demonstrates normal contour with homogeneous smooth echotexture  No right-sided intratesticular mass lesion or calcifications    LEFT testis = 15 1 x 9 7 x 14 0 cm  The left testicle is markedly enlarged with profoundly heterogeneous echotexture      1/17/20 Urology, Dr Dinesh Vital for left radical orchiectomy and staging imaging  1/20/20 Left radical orchiectomy     2/3/20 Urology follow-up, Dr Wilbur Feng  Reviewed pathology, left testicular seminoma, 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  Scrotal skin was negative for tumor involvement, no tumor was seen at the spermatic cord or the spermatic cord margin, and no penetration through the tunica vaginalis was seen  Plan for CT abd/pelvis and chest xray, recheck lab work  Follow-up to review and referral at that time  2/5/20 Bloodwork:  AFP tumor marker: 5 0  HCG tumor marker: < 1  HCG, Quantitative: < 0 6      2/7/20 CT Abd/Pelvis  IMPRESSION:   1   No evidence of metastatic disease in the abdomen or pelvis    2   Large collection in the left hemiscrotum with central hyperdensity likely representing hematoma  Clinical correlation recommended    3   Postoperative stranding in the left inguinal region with prominent lymph nodes which are likely reactive    4   Cholelithiasis  2/7/20 XR chest pa & lateral  IMPRESSION:   No acute cardiopulmonary disease  2/17/20 Urology f/u with Dr Wilbur Feng  CT negative for metastases, HCG and AFP markers are negative  Refer to Radiation Oncology for consideration of hockey-stick adjuvant radiation therapy  Scrotal hematoma mostly resolved, no signs of infection, follow-up in 3 months  2/18/20 Multidisciplinary Urology Case Review  Physician Recommended Plan:  Referral to Radiation Oncology and Medical Oncology        2/21/20 Dr Jacob Chavez  Favor observation   Prophylactic radiation therapy or chemotherapy are reasonable to consider  Reviewed that likelihood of cure is high, observation is reasonable with a goal of avoiding unnecessary treatment  If recurrent, salvage is generally quite effective  Pt and mother prefer avoiding therapy unless necessary  Plan for follow-up in May with repeat imaging and bloodwork to initiate surveillance  5/8/20 CT abd/pelvis and CXR  5/12/20 Dr Mann Lema Urology follow-up  5/15/20 Saray Vargas follow-up           Testicular seminoma, left [facility])   1/2020 Initial Diagnosis    Testicular seminoma, left ([facility] [address] )     1/20/2020 Surgery    Left radical orchiectomy inguinal, patrial scrotectomy (Left)-combination of inguinal and scrotal approach due to size     A  Left testis and spermatic cord:  - Seminoma, 15 5 cm in greatest dimension, with extension beyond tunica albuginea as evidenced by tumor in epididymis and in hilar fat  No penetration through tunica vaginalis is appreciated  - No tumor seen within spermatic cord or at spermatic cord margin  - See synoptic report below     B   Scrotal skin:  - No tumor seen         1/18/2021 -  Chemotherapy    pegfilgrastim (NEULASTA ONPRO) subcutaneous injection kit 6 mg, 6 mg, Subcutaneous, Once, 3 of 4 cycles  Administration: 6 mg (1/29/2021), 6 mg (2/22/2021), 6 mg (3/12/2021)  CISplatin (PLATINOL) 50 mg in sodium chloride 0 9 % 500 mL infusion, 48 8 mg, Intravenous, Once, 3 of 4 cycles  Administration: 50 mg (1/18/2021), 50 mg (1/19/2021), 50 mg (2/15/2021), 50 mg (1/20/2021), 50 mg (1/28/2021), 50 mg (1/29/2021), 50 mg (2/16/2021), 50 mg (3/8/2021), 50 mg (2/17/2021), 50 mg (2/18/2021), 50 mg (2/22/2021), 50 mg (3/9/2021), 50 mg (3/10/2021), 50 mg (3/11/2021), 50 mg (3/12/2021)  fosaprepitant (EMEND) 150 mg in sodium chloride 0 9 % 250 mL IVPB, 150 mg, Intravenous, Once, 3 of 4 cycles  Administration: 150 mg (1/18/2021), 150 mg (2/15/2021), 150 mg (3/8/2021), 150 mg (1/28/2021)  etoposide (TOPOSAR) 244 mg in sodium chloride 0 9 % 1,000 mL chemo infusion, 100 mg/m2 = 244 mg, Intravenous, Once, 3 of 4 cycles  Administration: 244 mg (1/18/2021), 244 mg (1/19/2021), 244 mg (2/15/2021), 244 mg (1/20/2021), 244 mg (1/28/2021), 244 mg (1/29/2021), 244 mg (2/16/2021), 244 mg (3/8/2021), 244 mg (2/17/2021), 244 mg (2/18/2021), 244 mg (2/22/2021), 244 mg (3/9/2021), 244 mg (3/10/2021), 244 mg (3/11/2021), 244 mg (3/12/2021)         Interval History:  Clinically stable  ECOG-  1 - Symptomatic but completely ambulatory    Review of Systems   Constitutional: Negative for chills and fever  HENT: Negative for nosebleeds  Eyes: Negative for discharge  Respiratory: Negative for cough and shortness of breath  Cardiovascular: Negative for chest pain  Gastrointestinal: Negative for abdominal pain, constipation and diarrhea  Endocrine: Negative for polydipsia  Genitourinary: Negative for hematuria  Musculoskeletal: Negative for arthralgias  Skin: Negative for color change  Allergic/Immunologic: Negative for immunocompromised state  Neurological: Negative for dizziness and headaches  Hematological: Negative for adenopathy  Psychiatric/Behavioral: Negative for agitation         Past Medical History:   Diagnosis Date    Cleft hard palate     Mass of left testicle     [address] [address]     Testicular mass 1/16/2020    Added automatically from request for surgery [id number]     Patient Active Problem List   Diagnosis    Mentally challenged    Tobacco abuse    Nonepileptic episode ([facility] [address] )    S/P ventriculoperitoneal shunt    Chronic pain of right hip    Arachnoid cyst    Testicular seminoma, left ([facility] [address]    Prevention of chemotherapy-induced neutropenia    Chemotherapy induced nausea and vomiting       Current Outpatient Medications:     meloxicam (MOBIC) 15 mg tablet, Take 1 tablet (15 mg total) by mouth daily, Disp: 90 tablet, Rfl: 3    ondansetron (ZOFRAN) 8 mg tablet, Take 1 tablet (8 mg total) by mouth every 8 (eight) hours as needed for nausea or vomiting, Disp: 20 tablet, Rfl: 2  No current facility-administered medications for this visit  No Known Allergies  Past Surgical History:   Procedure Laterality Date    CLEFT LIP REPAIR      IR BIOPSY RETROPERITONEUM  12/22/2020    ORCHIECTOMY Left     [place] REMOVAL TESTIS,RADICAL Left 1/20/2020    Procedure: LEFT RADICAL ORCHIECTOMY INGUINAL, patrial scrotectomy;  Surgeon: René Mccoy MD;  Location: [facility];  Service: Urology     Social History     Objective:  Vitals:    03/16/21 1329   Pulse: 100   SpO2: 97%   Weight: 122 kg (268 lb)   Height: 6' 2" (1 88 m)     Physical Exam  Constitutional:       Appearance: He is well-developed  HENT:      Head: Normocephalic and atraumatic  Eyes:      Pupils: Pupils are equal, round, and reactive to light  Neck:      Musculoskeletal: Neck supple  Cardiovascular:      Rate and Rhythm: Normal rate and regular rhythm  Heart sounds: No murmur  Pulmonary:      Breath sounds: Normal breath sounds  No wheezing or rales  Abdominal:      Palpations: Abdomen is soft  Tenderness: There is no abdominal tenderness  Musculoskeletal: Normal range of motion  General: No tenderness  Lymphadenopathy:      Cervical: No cervical adenopathy  Skin:     Findings: No erythema or rash  Neurological:      Mental Status: He is alert and oriented to person, place, and time  Cranial Nerves: No cranial nerve deficit  Deep Tendon Reflexes: Reflexes are normal and symmetric  Psychiatric:         Behavior: Behavior normal            Labs: I personally reviewed the labs and imaging pertinent to this patient care

## 2021-03-19 ENCOUNTER — APPOINTMENT (OUTPATIENT)
Dept: LAB | Facility: HOSPITAL | Age: 46
End: 2021-03-19
Attending: INTERNAL MEDICINE
Payer: MEDICARE

## 2021-03-19 DIAGNOSIS — C62.92 TESTICULAR SEMINOMA, LEFT (HCC): ICD-10-CM

## 2021-03-19 LAB
ALBUMIN SERPL BCP-MCNC: 3.6 G/DL (ref 3.5–5)
ALP SERPL-CCNC: 101 U/L (ref 46–116)
ALT SERPL W P-5'-P-CCNC: 32 U/L (ref 12–78)
ANION GAP SERPL CALCULATED.3IONS-SCNC: 12 MMOL/L (ref 4–13)
ANISOCYTOSIS BLD QL SMEAR: PRESENT
AST SERPL W P-5'-P-CCNC: 14 U/L (ref 5–45)
BASOPHILS # BLD MANUAL: 0 THOUSAND/UL (ref 0–0.1)
BASOPHILS NFR MAR MANUAL: 0 % (ref 0–1)
BILIRUB SERPL-MCNC: 0.5 MG/DL (ref 0.2–1)
BUN SERPL-MCNC: 14 MG/DL (ref 5–25)
CALCIUM SERPL-MCNC: 8.9 MG/DL (ref 8.3–10.1)
CHLORIDE SERPL-SCNC: 102 MMOL/L (ref 100–108)
CO2 SERPL-SCNC: 25 MMOL/L (ref 21–32)
CREAT SERPL-MCNC: 0.9 MG/DL (ref 0.6–1.3)
EOSINOPHIL # BLD MANUAL: 0 THOUSAND/UL (ref 0–0.4)
EOSINOPHIL NFR BLD MANUAL: 0 % (ref 0–6)
ERYTHROCYTE [DISTWIDTH] IN BLOOD BY AUTOMATED COUNT: 16.7 % (ref 11.6–15.1)
GFR SERPL CREATININE-BSD FRML MDRD: 104 ML/MIN/1.73SQ M
GLUCOSE P FAST SERPL-MCNC: 119 MG/DL (ref 65–99)
HCT VFR BLD AUTO: 32.1 % (ref 36.5–49.3)
HGB BLD-MCNC: 10.7 G/DL (ref 12–17)
LYMPHOCYTES # BLD AUTO: 2.58 THOUSAND/UL (ref 0.6–4.47)
LYMPHOCYTES # BLD AUTO: 49 % (ref 14–44)
MAGNESIUM SERPL-MCNC: 1.2 MG/DL (ref 1.6–2.6)
MCH RBC QN AUTO: 30.9 PG (ref 26.8–34.3)
MCHC RBC AUTO-ENTMCNC: 33.3 G/DL (ref 31.4–37.4)
MCV RBC AUTO: 93 FL (ref 82–98)
MONOCYTES # BLD AUTO: 1.21 THOUSAND/UL (ref 0–1.22)
MONOCYTES NFR BLD: 23 % (ref 4–12)
NEUTROPHILS # BLD MANUAL: 1.47 THOUSAND/UL (ref 1.85–7.62)
NEUTS SEG NFR BLD AUTO: 28 % (ref 43–75)
NRBC BLD AUTO-RTO: 0 /100 WBCS
PLATELET # BLD AUTO: 282 THOUSANDS/UL (ref 149–390)
PLATELET BLD QL SMEAR: ADEQUATE
PMV BLD AUTO: 9.7 FL (ref 8.9–12.7)
POTASSIUM SERPL-SCNC: 3.9 MMOL/L (ref 3.5–5.3)
PROT SERPL-MCNC: 7 G/DL (ref 6.4–8.2)
RBC # BLD AUTO: 3.46 MILLION/UL (ref 3.88–5.62)
SODIUM SERPL-SCNC: 139 MMOL/L (ref 136–145)
TOTAL CELLS COUNTED SPEC: 100
WBC # BLD AUTO: 5.26 THOUSAND/UL (ref 4.31–10.16)

## 2021-03-19 PROCEDURE — 36415 COLL VENOUS BLD VENIPUNCTURE: CPT

## 2021-03-19 PROCEDURE — 85027 COMPLETE CBC AUTOMATED: CPT

## 2021-03-19 PROCEDURE — 80053 COMPREHEN METABOLIC PANEL: CPT

## 2021-03-19 PROCEDURE — 83735 ASSAY OF MAGNESIUM: CPT

## 2021-03-19 PROCEDURE — 85007 BL SMEAR W/DIFF WBC COUNT: CPT

## 2021-03-23 DIAGNOSIS — C62.92 TESTICULAR SEMINOMA, LEFT (HCC): Primary | ICD-10-CM

## 2021-03-26 ENCOUNTER — APPOINTMENT (OUTPATIENT)
Dept: LAB | Facility: HOSPITAL | Age: 46
End: 2021-03-26
Attending: INTERNAL MEDICINE
Payer: MEDICARE

## 2021-03-29 RX ORDER — SODIUM CHLORIDE 9 MG/ML
20 INJECTION, SOLUTION INTRAVENOUS ONCE
Status: CANCELLED | OUTPATIENT
Start: 2021-04-05

## 2021-03-29 RX ORDER — SODIUM CHLORIDE 9 MG/ML
20 INJECTION, SOLUTION INTRAVENOUS ONCE
Status: CANCELLED | OUTPATIENT
Start: 2021-04-07

## 2021-03-29 RX ORDER — SODIUM CHLORIDE 9 MG/ML
20 INJECTION, SOLUTION INTRAVENOUS ONCE
Status: CANCELLED | OUTPATIENT
Start: 2021-04-09

## 2021-03-29 RX ORDER — SODIUM CHLORIDE 9 MG/ML
20 INJECTION, SOLUTION INTRAVENOUS ONCE
Status: CANCELLED | OUTPATIENT
Start: 2021-04-06

## 2021-03-29 RX ORDER — SODIUM CHLORIDE 9 MG/ML
20 INJECTION, SOLUTION INTRAVENOUS ONCE
Status: CANCELLED | OUTPATIENT
Start: 2021-04-08

## 2021-04-02 ENCOUNTER — APPOINTMENT (OUTPATIENT)
Dept: LAB | Facility: HOSPITAL | Age: 46
End: 2021-04-02
Attending: INTERNAL MEDICINE
Payer: MEDICARE

## 2021-04-02 DIAGNOSIS — C62.92 TESTICULAR SEMINOMA, LEFT (HCC): ICD-10-CM

## 2021-04-02 LAB
ALBUMIN SERPL BCP-MCNC: 3.9 G/DL (ref 3.5–5)
ALP SERPL-CCNC: 84 U/L (ref 46–116)
ALT SERPL W P-5'-P-CCNC: 35 U/L (ref 12–78)
ANION GAP SERPL CALCULATED.3IONS-SCNC: 12 MMOL/L (ref 4–13)
AST SERPL W P-5'-P-CCNC: 16 U/L (ref 5–45)
BASOPHILS # BLD AUTO: 0.05 THOUSANDS/ΜL (ref 0–0.1)
BASOPHILS NFR BLD AUTO: 1 % (ref 0–1)
BILIRUB SERPL-MCNC: 0.48 MG/DL (ref 0.2–1)
BUN SERPL-MCNC: 20 MG/DL (ref 5–25)
CALCIUM SERPL-MCNC: 9.6 MG/DL (ref 8.3–10.1)
CHLORIDE SERPL-SCNC: 104 MMOL/L (ref 100–108)
CO2 SERPL-SCNC: 25 MMOL/L (ref 21–32)
CREAT SERPL-MCNC: 0.99 MG/DL (ref 0.6–1.3)
EOSINOPHIL # BLD AUTO: 0.04 THOUSAND/ΜL (ref 0–0.61)
EOSINOPHIL NFR BLD AUTO: 0 % (ref 0–6)
ERYTHROCYTE [DISTWIDTH] IN BLOOD BY AUTOMATED COUNT: 19.9 % (ref 11.6–15.1)
GFR SERPL CREATININE-BSD FRML MDRD: 92 ML/MIN/1.73SQ M
GLUCOSE P FAST SERPL-MCNC: 101 MG/DL (ref 65–99)
HCT VFR BLD AUTO: 35.7 % (ref 36.5–49.3)
HGB BLD-MCNC: 11.5 G/DL (ref 12–17)
IMM GRANULOCYTES # BLD AUTO: 0.03 THOUSAND/UL (ref 0–0.2)
IMM GRANULOCYTES NFR BLD AUTO: 0 % (ref 0–2)
LYMPHOCYTES # BLD AUTO: 3.23 THOUSANDS/ΜL (ref 0.6–4.47)
LYMPHOCYTES NFR BLD AUTO: 34 % (ref 14–44)
MCH RBC QN AUTO: 31.5 PG (ref 26.8–34.3)
MCHC RBC AUTO-ENTMCNC: 32.2 G/DL (ref 31.4–37.4)
MCV RBC AUTO: 98 FL (ref 82–98)
MONOCYTES # BLD AUTO: 0.96 THOUSAND/ΜL (ref 0.17–1.22)
MONOCYTES NFR BLD AUTO: 10 % (ref 4–12)
NEUTROPHILS # BLD AUTO: 5.31 THOUSANDS/ΜL (ref 1.85–7.62)
NEUTS SEG NFR BLD AUTO: 55 % (ref 43–75)
NRBC BLD AUTO-RTO: 0 /100 WBCS
PLATELET # BLD AUTO: 377 THOUSANDS/UL (ref 149–390)
PMV BLD AUTO: 9.6 FL (ref 8.9–12.7)
POTASSIUM SERPL-SCNC: 4.5 MMOL/L (ref 3.5–5.3)
PROT SERPL-MCNC: 7.4 G/DL (ref 6.4–8.2)
RBC # BLD AUTO: 3.65 MILLION/UL (ref 3.88–5.62)
SODIUM SERPL-SCNC: 141 MMOL/L (ref 136–145)
WBC # BLD AUTO: 9.62 THOUSAND/UL (ref 4.31–10.16)

## 2021-04-02 PROCEDURE — 85025 COMPLETE CBC W/AUTO DIFF WBC: CPT

## 2021-04-02 PROCEDURE — 80053 COMPREHEN METABOLIC PANEL: CPT

## 2021-04-02 PROCEDURE — 36415 COLL VENOUS BLD VENIPUNCTURE: CPT

## 2021-04-05 ENCOUNTER — HOSPITAL ENCOUNTER (OUTPATIENT)
Dept: INFUSION CENTER | Facility: HOSPITAL | Age: 46
Discharge: HOME | End: 2021-04-05
Attending: INTERNAL MEDICINE
Payer: MEDICARE

## 2021-04-05 VITALS
HEART RATE: 74 BPM | BODY MASS INDEX: 35 KG/M2 | RESPIRATION RATE: 18 BRPM | WEIGHT: 272.71 LBS | SYSTOLIC BLOOD PRESSURE: 155 MMHG | TEMPERATURE: 98.5 F | DIASTOLIC BLOOD PRESSURE: 75 MMHG | OXYGEN SATURATION: 97 % | HEIGHT: 74 IN

## 2021-04-05 DIAGNOSIS — T45.1X5A CHEMOTHERAPY INDUCED NAUSEA AND VOMITING: Primary | ICD-10-CM

## 2021-04-05 DIAGNOSIS — Z76.89 PREVENTION OF CHEMOTHERAPY-INDUCED NEUTROPENIA: ICD-10-CM

## 2021-04-05 DIAGNOSIS — R11.2 CHEMOTHERAPY INDUCED NAUSEA AND VOMITING: Primary | ICD-10-CM

## 2021-04-05 DIAGNOSIS — C62.92 TESTICULAR SEMINOMA, LEFT (HCC): ICD-10-CM

## 2021-04-05 PROCEDURE — 96367 TX/PROPH/DG ADDL SEQ IV INF: CPT

## 2021-04-05 PROCEDURE — 96413 CHEMO IV INFUSION 1 HR: CPT

## 2021-04-05 PROCEDURE — 96361 HYDRATE IV INFUSION ADD-ON: CPT

## 2021-04-05 PROCEDURE — 96417 CHEMO IV INFUS EACH ADDL SEQ: CPT

## 2021-04-05 RX ORDER — SODIUM CHLORIDE 9 MG/ML
20 INJECTION, SOLUTION INTRAVENOUS ONCE
Status: COMPLETED | OUTPATIENT
Start: 2021-04-05 | End: 2021-04-05

## 2021-04-05 RX ADMIN — ETOPOSIDE 244 MG: 20 INJECTION INTRAVENOUS at 11:57

## 2021-04-05 RX ADMIN — DEXAMETHASONE SODIUM PHOSPHATE: 10 INJECTION, SOLUTION INTRAMUSCULAR; INTRAVENOUS at 09:25

## 2021-04-05 RX ADMIN — CISPLATIN 50 MG: 1 INJECTION INTRAVENOUS at 10:34

## 2021-04-05 RX ADMIN — SODIUM CHLORIDE 500 ML: 0.9 INJECTION, SOLUTION INTRAVENOUS at 08:20

## 2021-04-05 RX ADMIN — FOSAPREPITANT 150 MG: 150 INJECTION, POWDER, LYOPHILIZED, FOR SOLUTION INTRAVENOUS at 09:51

## 2021-04-05 RX ADMIN — SODIUM CHLORIDE 20 ML/HR: 0.9 INJECTION, SOLUTION INTRAVENOUS at 09:25

## 2021-04-05 RX ADMIN — SODIUM CHLORIDE 500 ML: 0.9 INJECTION, SOLUTION INTRAVENOUS at 13:17

## 2021-04-05 NOTE — PLAN OF CARE
Problem: Potential for Falls  Goal: Patient will remain free of falls  Description: INTERVENTIONS:  - Assess patient frequently for physical needs  -  Identify cognitive and physical deficits and behaviors that affect risk of falls    -  [place] fall precautions as indicated by assessment   - Educate patient/family on patient safety including physical limitations  - Instruct patient to call for assistance with activity based on assessment  - Modify environment to reduce risk of injury  - Consider OT/PT consult to assist with strengthening/mobility  Outcome: Progressing

## 2021-04-05 NOTE — PROGRESS NOTES
Pt tolerated treatment without adverse reaction   Aware of return appointment tomorrow 8am  Declines AVS

## 2021-04-06 ENCOUNTER — HOSPITAL ENCOUNTER (OUTPATIENT)
Dept: INFUSION CENTER | Facility: HOSPITAL | Age: 46
Discharge: HOME | End: 2021-04-06
Attending: INTERNAL MEDICINE
Payer: MEDICARE

## 2021-04-06 ENCOUNTER — PATIENT OUTREACH (OUTPATIENT)
Dept: CASE MANAGEMENT | Facility: HOSPITAL | Age: 46
End: 2021-04-06

## 2021-04-06 VITALS
SYSTOLIC BLOOD PRESSURE: 150 MMHG | BODY MASS INDEX: 35 KG/M2 | TEMPERATURE: 98.5 F | DIASTOLIC BLOOD PRESSURE: 67 MMHG | HEART RATE: 74 BPM | RESPIRATION RATE: 18 BRPM | HEIGHT: 74 IN | WEIGHT: 272.71 LBS | OXYGEN SATURATION: 97 %

## 2021-04-06 DIAGNOSIS — C62.92 TESTICULAR SEMINOMA, LEFT (HCC): ICD-10-CM

## 2021-04-06 DIAGNOSIS — T45.1X5A CHEMOTHERAPY INDUCED NAUSEA AND VOMITING: Primary | ICD-10-CM

## 2021-04-06 DIAGNOSIS — Z76.89 PREVENTION OF CHEMOTHERAPY-INDUCED NEUTROPENIA: ICD-10-CM

## 2021-04-06 DIAGNOSIS — R11.2 CHEMOTHERAPY INDUCED NAUSEA AND VOMITING: Primary | ICD-10-CM

## 2021-04-06 PROCEDURE — 96413 CHEMO IV INFUSION 1 HR: CPT

## 2021-04-06 PROCEDURE — 96417 CHEMO IV INFUS EACH ADDL SEQ: CPT

## 2021-04-06 PROCEDURE — 96361 HYDRATE IV INFUSION ADD-ON: CPT

## 2021-04-06 PROCEDURE — 96367 TX/PROPH/DG ADDL SEQ IV INF: CPT

## 2021-04-06 RX ORDER — SODIUM CHLORIDE 9 MG/ML
20 INJECTION, SOLUTION INTRAVENOUS ONCE
Status: COMPLETED | OUTPATIENT
Start: 2021-04-06 | End: 2021-04-06

## 2021-04-06 RX ADMIN — CISPLATIN 50 MG: 1 INJECTION INTRAVENOUS at 09:57

## 2021-04-06 RX ADMIN — SODIUM CHLORIDE 500 ML: 0.9 INJECTION, SOLUTION INTRAVENOUS at 08:12

## 2021-04-06 RX ADMIN — SODIUM CHLORIDE 20 ML/HR: 0.9 INJECTION, SOLUTION INTRAVENOUS at 08:11

## 2021-04-06 RX ADMIN — SODIUM CHLORIDE 500 ML: 0.9 INJECTION, SOLUTION INTRAVENOUS at 12:30

## 2021-04-06 RX ADMIN — DEXAMETHASONE SODIUM PHOSPHATE: 10 INJECTION, SOLUTION INTRAMUSCULAR; INTRAVENOUS at 09:18

## 2021-04-06 RX ADMIN — ETOPOSIDE 244 MG: 20 INJECTION INTRAVENOUS at 11:06

## 2021-04-06 NOTE — PROGRESS NOTES
Patient tolerated Cisplatin and etoposide without incidence  Hydrated pre and post chemo   Discharged ambulating, defers AVS

## 2021-04-06 NOTE — PROGRESS NOTES
Oncology LSW met with PT chairside to provide support and assess for areas of need  PT reported that he was "doing good" and had no areas of concern at this time  PT and LSW then engaged in light conversation, discussing how PT's Sulma had gone and his favorite movies and TV shows  LSW encouraged PT to reach out to her should any areas of concern arise  PT was agreeable with this plan and thanked LSW for her time

## 2021-04-06 NOTE — PLAN OF CARE
Date & Time: 8/2/2023, 10:37 AM  Patient: Mabel Manzanares  Encounter Provider(s):    Betty Goff DO       To Whom It May Concern:    Charissa Kendall was seen and treated in our department on 8/2/2023. He may not return to work until 08/06/2023.     If you have any questions or concerns, please do not hesitate to call.        _____________________________  Physician/APC Signature Problem: Potential for Falls  Goal: Patient will remain free of falls  Description: INTERVENTIONS:  - Assess patient frequently for physical needs  -  Identify cognitive and physical deficits and behaviors that affect risk of falls    -  [place] fall precautions as indicated by assessment   - Educate patient/family on patient safety including physical limitations  - Instruct patient to call for assistance with activity based on assessment  - Modify environment to reduce risk of injury  - Consider OT/PT consult to assist with strengthening/mobility  Outcome: Progressing

## 2021-04-06 NOTE — PLAN OF CARE
Problem: Potential for Falls  Goal: Patient will remain free of falls  Description: INTERVENTIONS:  - Assess patient frequently for physical needs  -  Identify cognitive and physical deficits and behaviors that affect risk of falls    -  [place] fall precautions as indicated by assessment   - Educate patient/family on patient safety including physical limitations  - Instruct patient to call for assistance with activity based on assessment  - Modify environment to reduce risk of injury  - Consider OT/PT consult to assist with strengthening/mobility  4/6/2021 0801 by Bob Orona RN  Outcome: Progressing  4/6/2021 0801 by Bob Orona RN  Outcome: Progressing

## 2021-04-07 ENCOUNTER — HOSPITAL ENCOUNTER (OUTPATIENT)
Dept: INFUSION CENTER | Facility: HOSPITAL | Age: 46
Discharge: HOME | End: 2021-04-07
Attending: INTERNAL MEDICINE
Payer: MEDICARE

## 2021-04-07 VITALS
DIASTOLIC BLOOD PRESSURE: 75 MMHG | HEIGHT: 74 IN | BODY MASS INDEX: 35 KG/M2 | OXYGEN SATURATION: 99 % | TEMPERATURE: 98.2 F | SYSTOLIC BLOOD PRESSURE: 158 MMHG | WEIGHT: 272.71 LBS | HEART RATE: 71 BPM | RESPIRATION RATE: 18 BRPM

## 2021-04-07 DIAGNOSIS — T45.1X5A CHEMOTHERAPY INDUCED NAUSEA AND VOMITING: Primary | ICD-10-CM

## 2021-04-07 DIAGNOSIS — Z76.89 PREVENTION OF CHEMOTHERAPY-INDUCED NEUTROPENIA: ICD-10-CM

## 2021-04-07 DIAGNOSIS — C62.92 TESTICULAR SEMINOMA, LEFT (HCC): ICD-10-CM

## 2021-04-07 DIAGNOSIS — R11.2 CHEMOTHERAPY INDUCED NAUSEA AND VOMITING: Primary | ICD-10-CM

## 2021-04-07 PROCEDURE — 96413 CHEMO IV INFUSION 1 HR: CPT

## 2021-04-07 PROCEDURE — 96367 TX/PROPH/DG ADDL SEQ IV INF: CPT

## 2021-04-07 PROCEDURE — 96417 CHEMO IV INFUS EACH ADDL SEQ: CPT

## 2021-04-07 PROCEDURE — 96361 HYDRATE IV INFUSION ADD-ON: CPT

## 2021-04-07 RX ORDER — SODIUM CHLORIDE 9 MG/ML
20 INJECTION, SOLUTION INTRAVENOUS ONCE
Status: COMPLETED | OUTPATIENT
Start: 2021-04-07 | End: 2021-04-07

## 2021-04-07 RX ADMIN — ETOPOSIDE 244 MG: 20 INJECTION INTRAVENOUS at 11:09

## 2021-04-07 RX ADMIN — SODIUM CHLORIDE 20 ML/HR: 0.9 INJECTION, SOLUTION INTRAVENOUS at 08:10

## 2021-04-07 RX ADMIN — CISPLATIN 50 MG: 1 INJECTION INTRAVENOUS at 09:55

## 2021-04-07 RX ADMIN — SODIUM CHLORIDE 500 ML: 0.9 INJECTION, SOLUTION INTRAVENOUS at 12:19

## 2021-04-07 RX ADMIN — DEXAMETHASONE SODIUM PHOSPHATE: 10 INJECTION, SOLUTION INTRAMUSCULAR; INTRAVENOUS at 09:25

## 2021-04-07 RX ADMIN — SODIUM CHLORIDE 500 ML: 0.9 INJECTION, SOLUTION INTRAVENOUS at 08:11

## 2021-04-07 NOTE — PROGRESS NOTES
Patient tolerated Cisplatin and etoposide without issue  Hydrated pre and post chemo   Discharged ambulatory, defers AVS

## 2021-04-08 ENCOUNTER — HOSPITAL ENCOUNTER (OUTPATIENT)
Dept: INFUSION CENTER | Facility: HOSPITAL | Age: 46
Discharge: HOME | End: 2021-04-08
Attending: INTERNAL MEDICINE
Payer: MEDICARE

## 2021-04-08 VITALS
HEART RATE: 85 BPM | RESPIRATION RATE: 18 BRPM | SYSTOLIC BLOOD PRESSURE: 151 MMHG | WEIGHT: 272.71 LBS | TEMPERATURE: 97.9 F | BODY MASS INDEX: 35 KG/M2 | HEIGHT: 74 IN | DIASTOLIC BLOOD PRESSURE: 85 MMHG

## 2021-04-08 DIAGNOSIS — C62.92 TESTICULAR SEMINOMA, LEFT (HCC): ICD-10-CM

## 2021-04-08 DIAGNOSIS — R11.2 CHEMOTHERAPY INDUCED NAUSEA AND VOMITING: Primary | ICD-10-CM

## 2021-04-08 DIAGNOSIS — Z76.89 PREVENTION OF CHEMOTHERAPY-INDUCED NEUTROPENIA: ICD-10-CM

## 2021-04-08 DIAGNOSIS — T45.1X5A CHEMOTHERAPY INDUCED NAUSEA AND VOMITING: Primary | ICD-10-CM

## 2021-04-08 PROCEDURE — 96417 CHEMO IV INFUS EACH ADDL SEQ: CPT

## 2021-04-08 PROCEDURE — 96413 CHEMO IV INFUSION 1 HR: CPT

## 2021-04-08 PROCEDURE — 96367 TX/PROPH/DG ADDL SEQ IV INF: CPT

## 2021-04-08 PROCEDURE — 96361 HYDRATE IV INFUSION ADD-ON: CPT

## 2021-04-08 RX ORDER — SODIUM CHLORIDE 9 MG/ML
20 INJECTION, SOLUTION INTRAVENOUS ONCE
Status: COMPLETED | OUTPATIENT
Start: 2021-04-08 | End: 2021-04-08

## 2021-04-08 RX ADMIN — SODIUM CHLORIDE 500 ML: 0.9 INJECTION, SOLUTION INTRAVENOUS at 13:11

## 2021-04-08 RX ADMIN — SODIUM CHLORIDE 20 ML/HR: 0.9 INJECTION, SOLUTION INTRAVENOUS at 09:06

## 2021-04-08 RX ADMIN — ETOPOSIDE 244 MG: 20 INJECTION INTRAVENOUS at 12:01

## 2021-04-08 RX ADMIN — DEXAMETHASONE SODIUM PHOSPHATE: 10 INJECTION, SOLUTION INTRAMUSCULAR; INTRAVENOUS at 10:11

## 2021-04-08 RX ADMIN — SODIUM CHLORIDE 500 ML: 0.9 INJECTION, SOLUTION INTRAVENOUS at 09:07

## 2021-04-08 RX ADMIN — CISPLATIN 50 MG: 1 INJECTION INTRAVENOUS at 10:58

## 2021-04-08 NOTE — PLAN OF CARE
Problem: Potential for Falls  Goal: Patient will remain free of falls  Description: INTERVENTIONS:  - Assess patient frequently for physical needs  -  Identify cognitive and physical deficits and behaviors that affect risk of falls    -  [place] fall precautions as indicated by assessment   - Educate patient/family on patient safety including physical limitations  - Instruct patient to call for assistance with activity based on assessment  - Modify environment to reduce risk of injury  - Consider OT/PT consult to assist with strengthening/mobility  Outcome: Progressing

## 2021-04-08 NOTE — PROGRESS NOTES
Patient tolerated Cisplatin and etoposide without issue  Hydrated pre and post chemo   Discharged ambulatory with AVS

## 2021-04-09 ENCOUNTER — HOSPITAL ENCOUNTER (OUTPATIENT)
Dept: INFUSION CENTER | Facility: HOSPITAL | Age: 46
Discharge: HOME | End: 2021-04-09
Attending: INTERNAL MEDICINE
Payer: MEDICARE

## 2021-04-09 VITALS
WEIGHT: 272.71 LBS | OXYGEN SATURATION: 100 % | DIASTOLIC BLOOD PRESSURE: 86 MMHG | TEMPERATURE: 97.4 F | SYSTOLIC BLOOD PRESSURE: 190 MMHG | BODY MASS INDEX: 35 KG/M2 | HEART RATE: 65 BPM | RESPIRATION RATE: 18 BRPM | HEIGHT: 74 IN

## 2021-04-09 DIAGNOSIS — R11.2 CHEMOTHERAPY INDUCED NAUSEA AND VOMITING: Primary | ICD-10-CM

## 2021-04-09 DIAGNOSIS — T45.1X5A CHEMOTHERAPY INDUCED NAUSEA AND VOMITING: Primary | ICD-10-CM

## 2021-04-09 DIAGNOSIS — C62.92 TESTICULAR SEMINOMA, LEFT (HCC): ICD-10-CM

## 2021-04-09 DIAGNOSIS — Z76.89 PREVENTION OF CHEMOTHERAPY-INDUCED NEUTROPENIA: ICD-10-CM

## 2021-04-09 LAB
ALBUMIN SERPL BCP-MCNC: 4 G/DL (ref 3.5–5.7)
ALP SERPL-CCNC: 46 U/L (ref 40–150)
ALT SERPL W P-5'-P-CCNC: 18 U/L (ref 7–52)
ANION GAP SERPL CALCULATED.3IONS-SCNC: 10 MMOL/L (ref 4–13)
AST SERPL W P-5'-P-CCNC: 15 U/L (ref 13–39)
BASOPHILS # BLD AUTO: 0 THOUSANDS/ΜL (ref 0–0.1)
BASOPHILS NFR BLD AUTO: 0 % (ref 0–2)
BILIRUB SERPL-MCNC: 1.3 MG/DL (ref 0.2–1)
BUN SERPL-MCNC: 23 MG/DL (ref 7–25)
CALCIUM SERPL-MCNC: 8.8 MG/DL (ref 8.6–10.5)
CHLORIDE SERPL-SCNC: 96 MMOL/L (ref 98–107)
CO2 SERPL-SCNC: 26 MMOL/L (ref 21–31)
CREAT SERPL-MCNC: 0.83 MG/DL (ref 0.7–1.3)
EOSINOPHIL # BLD AUTO: 0 THOUSAND/ΜL (ref 0–0.61)
EOSINOPHIL NFR BLD AUTO: 0 % (ref 0–5)
ERYTHROCYTE [DISTWIDTH] IN BLOOD BY AUTOMATED COUNT: 19.8 % (ref 11.5–14.5)
GFR SERPL CREATININE-BSD FRML MDRD: 107 ML/MIN/1.73SQ M
GLUCOSE SERPL-MCNC: 95 MG/DL (ref 65–99)
HCT VFR BLD AUTO: 32.2 % (ref 42–47)
HGB BLD-MCNC: 11.2 G/DL (ref 14–18)
LYMPHOCYTES # BLD AUTO: 2.6 THOUSANDS/ΜL (ref 0.6–4.47)
LYMPHOCYTES NFR BLD AUTO: 36 % (ref 21–51)
MAGNESIUM SERPL-MCNC: 1.5 MG/DL (ref 1.9–2.7)
MCH RBC QN AUTO: 32.9 PG (ref 26–34)
MCHC RBC AUTO-ENTMCNC: 34.8 G/DL (ref 31–37)
MCV RBC AUTO: 95 FL (ref 81–99)
MONOCYTES # BLD AUTO: 0.3 THOUSAND/ΜL (ref 0.17–1.22)
MONOCYTES NFR BLD AUTO: 4 % (ref 2–12)
NEUTROPHILS # BLD AUTO: 4.3 THOUSANDS/ΜL (ref 1.4–6.5)
NEUTS SEG NFR BLD AUTO: 59 % (ref 42–75)
PLATELET # BLD AUTO: 434 THOUSANDS/UL (ref 149–390)
PMV BLD AUTO: 8 FL (ref 8.6–11.7)
POTASSIUM SERPL-SCNC: 3.6 MMOL/L (ref 3.5–5.5)
PROT SERPL-MCNC: 6.5 G/DL (ref 6.4–8.9)
RBC # BLD AUTO: 3.4 MILLION/UL (ref 4.3–5.9)
SODIUM SERPL-SCNC: 132 MMOL/L (ref 134–143)
WBC # BLD AUTO: 7.2 THOUSAND/UL (ref 4.8–10.8)

## 2021-04-09 PROCEDURE — 83735 ASSAY OF MAGNESIUM: CPT | Performed by: INTERNAL MEDICINE

## 2021-04-09 PROCEDURE — 96367 TX/PROPH/DG ADDL SEQ IV INF: CPT

## 2021-04-09 PROCEDURE — 96413 CHEMO IV INFUSION 1 HR: CPT

## 2021-04-09 PROCEDURE — 96417 CHEMO IV INFUS EACH ADDL SEQ: CPT

## 2021-04-09 PROCEDURE — 96377 APPLICATON ON-BODY INJECTOR: CPT

## 2021-04-09 PROCEDURE — 80053 COMPREHEN METABOLIC PANEL: CPT | Performed by: INTERNAL MEDICINE

## 2021-04-09 PROCEDURE — 85025 COMPLETE CBC W/AUTO DIFF WBC: CPT | Performed by: INTERNAL MEDICINE

## 2021-04-09 PROCEDURE — 96361 HYDRATE IV INFUSION ADD-ON: CPT

## 2021-04-09 RX ORDER — SODIUM CHLORIDE 9 MG/ML
20 INJECTION, SOLUTION INTRAVENOUS ONCE
Status: COMPLETED | OUTPATIENT
Start: 2021-04-09 | End: 2021-04-09

## 2021-04-09 RX ADMIN — SODIUM CHLORIDE 20 ML/HR: 0.9 INJECTION, SOLUTION INTRAVENOUS at 10:47

## 2021-04-09 RX ADMIN — CISPLATIN 50 MG: 1 INJECTION INTRAVENOUS at 10:51

## 2021-04-09 RX ADMIN — SODIUM CHLORIDE 500 ML: 0.9 INJECTION, SOLUTION INTRAVENOUS at 09:36

## 2021-04-09 RX ADMIN — ETOPOSIDE 244 MG: 20 INJECTION INTRAVENOUS at 11:56

## 2021-04-09 RX ADMIN — DEXAMETHASONE SODIUM PHOSPHATE: 10 INJECTION, SOLUTION INTRAMUSCULAR; INTRAVENOUS at 10:19

## 2021-04-09 RX ADMIN — SODIUM CHLORIDE 500 ML: 0.9 INJECTION, SOLUTION INTRAVENOUS at 12:59

## 2021-04-09 RX ADMIN — PEGFILGRASTIM 6 MG: KIT SUBCUTANEOUS at 14:14

## 2021-04-09 NOTE — PROGRESS NOTES
Patient tolerated Cisplatin and etoposide without issue  Hydrated pre and post chemo  neulasta on pro applied to rt arm  Pt is familiar with device and who to call for issues  Discharged ambulatory deferring AVS  labs drawn by nimo hartley rn on admit for dr Ge El

## 2021-04-09 NOTE — PLAN OF CARE
Problem: Potential for Falls  Goal: Patient will remain free of falls  Description: INTERVENTIONS:  - Assess patient frequently for physical needs  -  Identify cognitive and physical deficits and behaviors that affect risk of falls    -  [place] fall precautions as indicated by assessment   - Educate patient/family on patient safety including physical limitations  - Instruct patient to call for assistance with activity based on assessment  - Modify environment to reduce risk of injury  - Consider OT/PT consult to assist with strengthening/mobility  Outcome: Progressing

## 2021-04-16 ENCOUNTER — APPOINTMENT (OUTPATIENT)
Dept: LAB | Facility: HOSPITAL | Age: 46
End: 2021-04-16
Attending: INTERNAL MEDICINE
Payer: MEDICARE

## 2021-04-16 ENCOUNTER — IMMUNIZATIONS (OUTPATIENT)
Dept: FAMILY MEDICINE CLINIC | Facility: HOSPITAL | Age: 46
End: 2021-04-16
Payer: MEDICARE

## 2021-04-16 DIAGNOSIS — Z23 ENCOUNTER FOR IMMUNIZATION: Primary | ICD-10-CM

## 2021-04-16 DIAGNOSIS — C62.92 TESTICULAR SEMINOMA, LEFT (HCC): ICD-10-CM

## 2021-04-16 LAB
ALBUMIN SERPL BCP-MCNC: 3.7 G/DL (ref 3.5–5)
ALP SERPL-CCNC: 88 U/L (ref 46–116)
ALT SERPL W P-5'-P-CCNC: 26 U/L (ref 12–78)
ANION GAP SERPL CALCULATED.3IONS-SCNC: 10 MMOL/L (ref 4–13)
AST SERPL W P-5'-P-CCNC: 16 U/L (ref 5–45)
BASOPHILS # BLD AUTO: 0.08 THOUSANDS/ΜL (ref 0–0.1)
BASOPHILS NFR BLD AUTO: 1 % (ref 0–1)
BILIRUB SERPL-MCNC: 0.58 MG/DL (ref 0.2–1)
BUN SERPL-MCNC: 16 MG/DL (ref 5–25)
CALCIUM SERPL-MCNC: 8.6 MG/DL (ref 8.3–10.1)
CHLORIDE SERPL-SCNC: 100 MMOL/L (ref 100–108)
CO2 SERPL-SCNC: 26 MMOL/L (ref 21–32)
CREAT SERPL-MCNC: 0.92 MG/DL (ref 0.6–1.3)
EOSINOPHIL # BLD AUTO: 0.03 THOUSAND/ΜL (ref 0–0.61)
EOSINOPHIL NFR BLD AUTO: 1 % (ref 0–6)
ERYTHROCYTE [DISTWIDTH] IN BLOOD BY AUTOMATED COUNT: 17.5 % (ref 11.6–15.1)
GFR SERPL CREATININE-BSD FRML MDRD: 101 ML/MIN/1.73SQ M
GLUCOSE SERPL-MCNC: 100 MG/DL (ref 65–140)
HCT VFR BLD AUTO: 30.1 % (ref 36.5–49.3)
HGB BLD-MCNC: 9.9 G/DL (ref 12–17)
IMM GRANULOCYTES # BLD AUTO: 0.01 THOUSAND/UL (ref 0–0.2)
IMM GRANULOCYTES NFR BLD AUTO: 0 % (ref 0–2)
LYMPHOCYTES # BLD AUTO: 2.55 THOUSANDS/ΜL (ref 0.6–4.47)
LYMPHOCYTES NFR BLD AUTO: 41 % (ref 14–44)
MCH RBC QN AUTO: 32.8 PG (ref 26.8–34.3)
MCHC RBC AUTO-ENTMCNC: 32.9 G/DL (ref 31.4–37.4)
MCV RBC AUTO: 100 FL (ref 82–98)
MONOCYTES # BLD AUTO: 0.49 THOUSAND/ΜL (ref 0.17–1.22)
MONOCYTES NFR BLD AUTO: 8 % (ref 4–12)
NEUTROPHILS # BLD AUTO: 3.05 THOUSANDS/ΜL (ref 1.85–7.62)
NEUTS SEG NFR BLD AUTO: 49 % (ref 43–75)
NRBC BLD AUTO-RTO: 0 /100 WBCS
PLATELET # BLD AUTO: 288 THOUSANDS/UL (ref 149–390)
PMV BLD AUTO: 9.7 FL (ref 8.9–12.7)
POTASSIUM SERPL-SCNC: 3.9 MMOL/L (ref 3.5–5.3)
PROT SERPL-MCNC: 7.2 G/DL (ref 6.4–8.2)
RBC # BLD AUTO: 3.02 MILLION/UL (ref 3.88–5.62)
SODIUM SERPL-SCNC: 136 MMOL/L (ref 136–145)
WBC # BLD AUTO: 6.21 THOUSAND/UL (ref 4.31–10.16)

## 2021-04-16 PROCEDURE — 80053 COMPREHEN METABOLIC PANEL: CPT

## 2021-04-16 PROCEDURE — 36415 COLL VENOUS BLD VENIPUNCTURE: CPT

## 2021-04-16 PROCEDURE — 91301 SARS-COV-2 / COVID-19 MRNA VACCINE (MODERNA) 100 MCG: CPT

## 2021-04-16 PROCEDURE — 0011A SARS-COV-2 / COVID-19 MRNA VACCINE (MODERNA) 100 MCG: CPT

## 2021-04-16 PROCEDURE — 85025 COMPLETE CBC W/AUTO DIFF WBC: CPT

## 2021-04-23 ENCOUNTER — APPOINTMENT (OUTPATIENT)
Dept: LAB | Facility: HOSPITAL | Age: 46
End: 2021-04-23
Payer: MEDICARE

## 2021-04-23 DIAGNOSIS — C62.92 TESTICULAR SEMINOMA, LEFT (HCC): ICD-10-CM

## 2021-04-23 NOTE — TELEPHONE ENCOUNTER
Attempted to c/b pt regarding his refusal of his CMP  Left a message with Mrs Bibi Brown  Pt to c/b on the [address] [place] [address] Road  (Pt with abnormal cmp and magnesium in the past and should be repeated) 
normal...

## 2021-04-30 ENCOUNTER — APPOINTMENT (OUTPATIENT)
Dept: LAB | Facility: HOSPITAL | Age: 46
End: 2021-04-30
Payer: MEDICARE

## 2021-05-07 ENCOUNTER — APPOINTMENT (OUTPATIENT)
Dept: LAB | Facility: HOSPITAL | Age: 46
End: 2021-05-07
Payer: MEDICARE

## 2021-05-14 ENCOUNTER — IMMUNIZATIONS (OUTPATIENT)
Dept: FAMILY MEDICINE CLINIC | Facility: HOSPITAL | Age: 46
End: 2021-05-14
Payer: MEDICARE

## 2021-05-14 ENCOUNTER — APPOINTMENT (OUTPATIENT)
Dept: LAB | Facility: HOSPITAL | Age: 46
End: 2021-05-14
Payer: MEDICARE

## 2021-05-14 DIAGNOSIS — Z23 ENCOUNTER FOR IMMUNIZATION: Primary | ICD-10-CM

## 2021-05-14 PROCEDURE — 0012A SARS-COV-2 / COVID-19 MRNA VACCINE (MODERNA) 100 MCG: CPT

## 2021-05-14 PROCEDURE — 91301 SARS-COV-2 / COVID-19 MRNA VACCINE (MODERNA) 100 MCG: CPT

## 2021-05-21 ENCOUNTER — APPOINTMENT (OUTPATIENT)
Dept: LAB | Facility: HOSPITAL | Age: 46
End: 2021-05-21
Attending: INTERNAL MEDICINE
Payer: MEDICARE

## 2021-05-21 DIAGNOSIS — C62.92 TESTICULAR SEMINOMA, LEFT (HCC): ICD-10-CM

## 2021-05-21 LAB
AFP-TM SERPL-MCNC: 5.1 NG/ML (ref 0.5–8)
ALBUMIN SERPL BCP-MCNC: 3.8 G/DL (ref 3.5–5)
ALP SERPL-CCNC: 92 U/L (ref 46–116)
ALT SERPL W P-5'-P-CCNC: 41 U/L (ref 12–78)
ANION GAP SERPL CALCULATED.3IONS-SCNC: 7 MMOL/L (ref 4–13)
AST SERPL W P-5'-P-CCNC: 20 U/L (ref 5–45)
BASOPHILS # BLD AUTO: 0.07 THOUSANDS/ΜL (ref 0–0.1)
BASOPHILS NFR BLD AUTO: 1 % (ref 0–1)
BILIRUB SERPL-MCNC: 0.32 MG/DL (ref 0.2–1)
BUN SERPL-MCNC: 21 MG/DL (ref 5–25)
CALCIUM SERPL-MCNC: 9.2 MG/DL (ref 8.3–10.1)
CHLORIDE SERPL-SCNC: 102 MMOL/L (ref 100–108)
CO2 SERPL-SCNC: 26 MMOL/L (ref 21–32)
CREAT SERPL-MCNC: 1.01 MG/DL (ref 0.6–1.3)
EOSINOPHIL # BLD AUTO: 0.44 THOUSAND/ΜL (ref 0–0.61)
EOSINOPHIL NFR BLD AUTO: 5 % (ref 0–6)
ERYTHROCYTE [DISTWIDTH] IN BLOOD BY AUTOMATED COUNT: 13.6 % (ref 11.6–15.1)
GFR SERPL CREATININE-BSD FRML MDRD: 90 ML/MIN/1.73SQ M
GLUCOSE P FAST SERPL-MCNC: 114 MG/DL (ref 65–99)
HCT VFR BLD AUTO: 39.1 % (ref 36.5–49.3)
HGB BLD-MCNC: 12.9 G/DL (ref 12–17)
IMM GRANULOCYTES # BLD AUTO: 0.03 THOUSAND/UL (ref 0–0.2)
IMM GRANULOCYTES NFR BLD AUTO: 0 % (ref 0–2)
LYMPHOCYTES # BLD AUTO: 2.99 THOUSANDS/ΜL (ref 0.6–4.47)
LYMPHOCYTES NFR BLD AUTO: 32 % (ref 14–44)
MCH RBC QN AUTO: 33.7 PG (ref 26.8–34.3)
MCHC RBC AUTO-ENTMCNC: 33 G/DL (ref 31.4–37.4)
MCV RBC AUTO: 102 FL (ref 82–98)
MONOCYTES # BLD AUTO: 0.61 THOUSAND/ΜL (ref 0.17–1.22)
MONOCYTES NFR BLD AUTO: 7 % (ref 4–12)
NEUTROPHILS # BLD AUTO: 5.13 THOUSANDS/ΜL (ref 1.85–7.62)
NEUTS SEG NFR BLD AUTO: 55 % (ref 43–75)
NRBC BLD AUTO-RTO: 0 /100 WBCS
PLATELET # BLD AUTO: 270 THOUSANDS/UL (ref 149–390)
PMV BLD AUTO: 9.2 FL (ref 8.9–12.7)
POTASSIUM SERPL-SCNC: 4.3 MMOL/L (ref 3.5–5.3)
PROT SERPL-MCNC: 7.7 G/DL (ref 6.4–8.2)
RBC # BLD AUTO: 3.83 MILLION/UL (ref 3.88–5.62)
SODIUM SERPL-SCNC: 135 MMOL/L (ref 136–145)
WBC # BLD AUTO: 9.27 THOUSAND/UL (ref 4.31–10.16)

## 2021-05-21 PROCEDURE — 36415 COLL VENOUS BLD VENIPUNCTURE: CPT

## 2021-05-21 PROCEDURE — 85025 COMPLETE CBC W/AUTO DIFF WBC: CPT

## 2021-05-21 PROCEDURE — 82105 ALPHA-FETOPROTEIN SERUM: CPT

## 2021-05-21 PROCEDURE — 80053 COMPREHEN METABOLIC PANEL: CPT

## 2021-05-28 ENCOUNTER — APPOINTMENT (OUTPATIENT)
Dept: LAB | Facility: HOSPITAL | Age: 46
End: 2021-05-28
Payer: MEDICARE

## 2021-06-04 ENCOUNTER — APPOINTMENT (OUTPATIENT)
Dept: LAB | Facility: HOSPITAL | Age: 46
End: 2021-06-04
Payer: MEDICARE

## 2021-06-04 DIAGNOSIS — C62.92 TESTICULAR SEMINOMA, LEFT (HCC): ICD-10-CM

## 2021-06-04 LAB
BASOPHILS # BLD AUTO: 0.04 THOUSANDS/ΜL (ref 0–0.1)
BASOPHILS NFR BLD AUTO: 1 % (ref 0–1)
EOSINOPHIL # BLD AUTO: 0.16 THOUSAND/ΜL (ref 0–0.61)
EOSINOPHIL NFR BLD AUTO: 2 % (ref 0–6)
ERYTHROCYTE [DISTWIDTH] IN BLOOD BY AUTOMATED COUNT: 13.2 % (ref 11.6–15.1)
HCT VFR BLD AUTO: 41.9 % (ref 36.5–49.3)
HGB BLD-MCNC: 13.8 G/DL (ref 12–17)
IMM GRANULOCYTES # BLD AUTO: 0.03 THOUSAND/UL (ref 0–0.2)
IMM GRANULOCYTES NFR BLD AUTO: 0 % (ref 0–2)
LYMPHOCYTES # BLD AUTO: 2.27 THOUSANDS/ΜL (ref 0.6–4.47)
LYMPHOCYTES NFR BLD AUTO: 26 % (ref 14–44)
MCH RBC QN AUTO: 32.8 PG (ref 26.8–34.3)
MCHC RBC AUTO-ENTMCNC: 32.9 G/DL (ref 31.4–37.4)
MCV RBC AUTO: 100 FL (ref 82–98)
MONOCYTES # BLD AUTO: 0.61 THOUSAND/ΜL (ref 0.17–1.22)
MONOCYTES NFR BLD AUTO: 7 % (ref 4–12)
NEUTROPHILS # BLD AUTO: 5.67 THOUSANDS/ΜL (ref 1.85–7.62)
NEUTS SEG NFR BLD AUTO: 64 % (ref 43–75)
NRBC BLD AUTO-RTO: 0 /100 WBCS
PLATELET # BLD AUTO: 275 THOUSANDS/UL (ref 149–390)
PMV BLD AUTO: 9.2 FL (ref 8.9–12.7)
RBC # BLD AUTO: 4.21 MILLION/UL (ref 3.88–5.62)
WBC # BLD AUTO: 8.78 THOUSAND/UL (ref 4.31–10.16)

## 2021-06-04 PROCEDURE — 85025 COMPLETE CBC W/AUTO DIFF WBC: CPT

## 2021-06-04 PROCEDURE — 36415 COLL VENOUS BLD VENIPUNCTURE: CPT

## 2021-06-11 ENCOUNTER — TELEPHONE (OUTPATIENT)
Dept: HEMATOLOGY ONCOLOGY | Facility: CLINIC | Age: 46
End: 2021-06-11

## 2021-06-11 DIAGNOSIS — C62.92 TESTICULAR SEMINOMA, LEFT (HCC): Primary | ICD-10-CM

## 2021-06-11 NOTE — TELEPHONE ENCOUNTER
Patient is at the out patient lab for blood draw  Standing weekly order has run out  Patient is finished with treatment    Will send to RN to confirm when patient needs to have labs drawn again  He is scheduled to see Dr Jennifer Yusuf 6/22/21 - specific labs needed for this appointment?   Last CBC was 6/4 and CMP 5/21/21    Call back number [phone]

## 2021-06-11 NOTE — TELEPHONE ENCOUNTER
Attempted to c/b Artem Sylwiagordon from the out patient lab  Was left on hold indefinitely  Attempted to call patient but Claus's mother answered his phone number that was supplied  Discussed with Claus's Mother that Mp Wilkerson is to have labs drawn prior to his next visit of 6/22/21  He should have a cbcd and a cmp drawn prior to his visit  She stated she would give Hardin Fruits this message

## 2021-06-15 ENCOUNTER — HOSPITAL ENCOUNTER (OUTPATIENT)
Dept: CT IMAGING | Facility: HOSPITAL | Age: 46
Discharge: HOME | End: 2021-06-15
Attending: INTERNAL MEDICINE
Payer: MEDICARE

## 2021-06-15 DIAGNOSIS — C62.92 TESTICULAR SEMINOMA, LEFT (HCC): ICD-10-CM

## 2021-06-15 PROCEDURE — G1004 CDSM NDSC: HCPCS

## 2021-06-15 PROCEDURE — 71260 CT THORAX DX C+: CPT

## 2021-06-15 PROCEDURE — 74177 CT ABD & PELVIS W/CONTRAST: CPT

## 2021-06-15 RX ADMIN — IOHEXOL 100 ML: 350 INJECTION, SOLUTION INTRAVENOUS at 13:59

## 2021-06-22 ENCOUNTER — OFFICE VISIT (OUTPATIENT)
Dept: HEMATOLOGY ONCOLOGY | Facility: CLINIC | Age: 46
End: 2021-06-22
Payer: MEDICARE

## 2021-06-22 VITALS
HEART RATE: 77 BPM | RESPIRATION RATE: 16 BRPM | SYSTOLIC BLOOD PRESSURE: 136 MMHG | DIASTOLIC BLOOD PRESSURE: 74 MMHG | OXYGEN SATURATION: 98 % | BODY MASS INDEX: 35.04 KG/M2 | HEIGHT: 74 IN | WEIGHT: 273 LBS | TEMPERATURE: 99 F

## 2021-06-22 DIAGNOSIS — C62.92 TESTICULAR SEMINOMA, LEFT (HCC): Primary | ICD-10-CM

## 2021-06-22 DIAGNOSIS — G89.29 CHRONIC PAIN OF RIGHT HIP: ICD-10-CM

## 2021-06-22 DIAGNOSIS — M25.551 CHRONIC PAIN OF RIGHT HIP: ICD-10-CM

## 2021-06-22 DIAGNOSIS — C77.2 SECONDARY AND UNSPECIFIED MALIGNANT NEOPLASM OF INTRA-ABDOMINAL LYMPH NODES (HCC): ICD-10-CM

## 2021-06-22 PROBLEM — R11.2 CHEMOTHERAPY INDUCED NAUSEA AND VOMITING: Status: RESOLVED | Noted: 2021-01-27 | Resolved: 2021-06-22

## 2021-06-22 PROBLEM — T45.1X5A CHEMOTHERAPY INDUCED NAUSEA AND VOMITING: Status: RESOLVED | Noted: 2021-01-27 | Resolved: 2021-06-22

## 2021-06-22 PROCEDURE — 99214 OFFICE O/P EST MOD 30 MIN: CPT | Performed by: INTERNAL MEDICINE

## 2021-06-22 NOTE — PROGRESS NOTES
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, biopsy-proven  He status post  16/cisplatin ending 2 months ago  Recent CBC and chemistry are normal   AFP is normal, this had not been abnormal in the past   Recent CT scan shows no evidence of residual lymphadenopathy in the abdomen or pelvis  Subcentimeter pulmonary nodules are largely stable  Changes are not significant  Follow-up in 2 months with blood work, 4 months with repeat imaging  He has some pain in the right hip which started about 2 days ago  No specific trauma  No abnormality on CT to correlate  I suspect he has a muscle strain  Nonsteroidals or Tylenol on a p r n  Basis were advocated  I discussed the above with the patient  The patient  voiced understanding and agreement   ______________________________________________________________________    Chief Complaint   Patient presents with    Follow-up       HPI:  Oncology History Overview Note   Patient presents today for radiation consult for testicular cancer, referred by Dr Christina Barron  38year old male with a history of developmental delay,  shunt placed at birth  He was evaluated by his PCP in January 2020 for scrotal swelling  US revealed left testicular mass, he was referred to Urology  1/15/20 US scrotum and testicles  IMPRESSION:   Markedly enlarged and abnormal left testicle with normal vascular flow on Doppler interrogation  The finding raises the suspicion of infiltrating process and possibly testicular malignancy (perhaps testicular lymphoma) especially given the history of slowly progressive testicular swelling    FINDINGS:   TESTES:    RIGHT testis = 4 5 x 1 7 x 2 8 cm   The right testicle demonstrates normal contour with homogeneous smooth echotexture  No right-sided intratesticular mass lesion or calcifications    LEFT testis = 15 1 x 9 7 x 14 0 cm  The left testicle is markedly enlarged with profoundly heterogeneous echotexture      1/17/20 Urology, Dr Sherrill Carvajal for left radical orchiectomy and staging imaging  1/20/20 Left radical orchiectomy     2/3/20 Urology follow-up, Dr Mabel Osullivan  Reviewed pathology, left testicular seminoma, 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  Scrotal skin was negative for tumor involvement, no tumor was seen at the spermatic cord or the spermatic cord margin, and no penetration through the tunica vaginalis was seen  Plan for CT abd/pelvis and chest xray, recheck lab work  Follow-up to review and referral at that time  2/5/20 Bloodwork:  AFP tumor marker: 5 0  HCG tumor marker: < 1  HCG, Quantitative: < 0 6      2/7/20 CT Abd/Pelvis  IMPRESSION:   1   No evidence of metastatic disease in the abdomen or pelvis    2   Large collection in the left hemiscrotum with central hyperdensity likely representing hematoma  Clinical correlation recommended    3   Postoperative stranding in the left inguinal region with prominent lymph nodes which are likely reactive    4   Cholelithiasis  2/7/20 XR chest pa & lateral  IMPRESSION:   No acute cardiopulmonary disease  2/17/20 Urology f/u with Dr Mabel Osullivan  CT negative for metastases, HCG and AFP markers are negative  Refer to Radiation Oncology for consideration of hockey-stick adjuvant radiation therapy  Scrotal hematoma mostly resolved, no signs of infection, follow-up in 3 months  2/18/20 Multidisciplinary Urology Case Review  Physician Recommended Plan:  Referral to Radiation Oncology and Medical Oncology        2/21/20 Dr Juanjo Serrato  Favor observation  Prophylactic radiation therapy or chemotherapy are reasonable to consider  Reviewed that likelihood of cure is high, observation is reasonable with a goal of avoiding unnecessary treatment  If recurrent, salvage is generally quite effective  Pt and mother prefer avoiding therapy unless necessary  Plan for follow-up in May with repeat imaging and bloodwork to initiate surveillance  5/8/20 CT abd/pelvis and CXR  5/12/20 Dr Huma Meek Urology follow-up  5/15/20 Bulmaro Del Toro follow-up           Testicular seminoma, left [facility])   1/2020 Initial Diagnosis    Testicular seminoma, left ([address] )     1/20/2020 Surgery    Left radical orchiectomy inguinal, patrial scrotectomy (Left)-combination of inguinal and scrotal approach due to size     A  Left testis and spermatic cord:  - Seminoma, 15 5 cm in greatest dimension, with extension beyond tunica albuginea as evidenced by tumor in epididymis and in hilar fat  No penetration through tunica vaginalis is appreciated  - No tumor seen within spermatic cord or at spermatic cord margin  - See synoptic report below     B   Scrotal skin:  - No tumor seen         1/18/2021 -  Chemotherapy    pegfilgrastim (NEULASTA ONPRO) subcutaneous injection kit 6 mg, 6 mg, Subcutaneous, Once, 4 of 4 cycles  Administration: 6 mg (1/29/2021), 6 mg (2/22/2021), 6 mg (3/12/2021), 6 mg (4/9/2021)  CISplatin (PLATINOL) 50 mg in sodium chloride 0 9 % 500 mL infusion, 48 8 mg, Intravenous, Once, 4 of 4 cycles  Administration: 50 mg (1/18/2021), 50 mg (1/19/2021), 50 mg (2/15/2021), 50 mg (1/20/2021), 50 mg (1/28/2021), 50 mg (1/29/2021), 50 mg (2/16/2021), 50 mg (3/8/2021), 50 mg (2/17/2021), 50 mg (2/18/2021), 50 mg (2/22/2021), 50 mg (3/9/2021), 50 mg (4/5/2021), 50 mg (3/10/2021), 50 mg (3/11/2021), 50 mg (3/12/2021), 50 mg (4/9/2021), 50 mg (4/8/2021), 50 mg (4/6/2021), 50 mg (4/7/2021)  fosaprepitant (EMEND) 150 mg in sodium chloride 0 9 % 250 mL IVPB, 150 mg, Intravenous, Once, 4 of 4 cycles  Administration: 150 mg (1/18/2021), 150 mg (2/15/2021), 150 mg (3/8/2021), 150 mg (4/5/2021), 150 mg (1/28/2021)  etoposide (TOPOSAR) 244 mg in sodium chloride 0 9 % 1,000 mL chemo infusion, 100 mg/m2 = 244 mg, Intravenous, Once, 4 of 4 cycles  Administration: 244 mg (1/18/2021), 244 mg (1/19/2021), 244 mg (2/15/2021), 244 mg (1/20/2021), 244 mg (1/28/2021), 244 mg (1/29/2021), 244 mg (2/16/2021), 244 mg (3/8/2021), 244 mg (2/17/2021), 244 mg (2/18/2021), 244 mg (2/22/2021), 244 mg (3/9/2021), 244 mg (4/5/2021), 244 mg (3/10/2021), 244 mg (3/11/2021), 244 mg (3/12/2021), 244 mg (4/9/2021), 244 mg (4/8/2021), 244 mg (4/6/2021), 244 mg (4/7/2021)         Interval History:  Clinically stable  ECOG-  1 - Symptomatic but completely ambulatory    Review of Systems   Constitutional: Negative for chills and fever  HENT: Negative for nosebleeds  Eyes: Negative for discharge  Respiratory: Negative for cough and shortness of breath  Cardiovascular: Negative for chest pain  Gastrointestinal: Negative for abdominal pain, constipation and diarrhea  Endocrine: Negative for polydipsia  Genitourinary: Negative for hematuria  Musculoskeletal: Negative for arthralgias  Skin: Negative for color change  Allergic/Immunologic: Negative for immunocompromised state  Neurological: Negative for dizziness and headaches  Hematological: Negative for adenopathy  Psychiatric/Behavioral: Negative for agitation         Past Medical History:   Diagnosis Date    Cleft hard palate     Mass of left testicle     [facility]iz[facility] [address]     Testicular mass 1/16/2020    Added automatically from request for surgery [id number]     Patient Active Problem List   Diagnosis    Mentally challenged    Tobacco abuse    Nonepileptic episode ([facility] [address] [facility]    S/P ventriculoperitoneal shunt    Chronic pain of right hip    Arachnoid cyst    Testicular seminoma, left ([facility] [address]    Prevention of chemotherapy-induced neutropenia    Chemotherapy induced nausea and vomiting       Current Outpatient Medications:     magnesium oxide (MAG-OX) 400 mg, Take 1 tablet (400 mg total) by mouth 2 (two) times a day, Disp: 60 tablet, Rfl: 0   meloxicam (MOBIC) 15 mg tablet, Take 1 tablet (15 mg total) by mouth daily, Disp: 90 tablet, Rfl: 3    ondansetron (ZOFRAN) 8 mg tablet, Take 1 tablet (8 mg total) by mouth every 8 (eight) hours as needed for nausea or vomiting, Disp: 20 tablet, Rfl: 2  No Known Allergies  Past Surgical History:   Procedure Laterality Date    CLEFT LIP REPAIR      IR BIOPSY RETROPERITONEUM  12/22/2020    ORCHIECTOMY Left     RI REMOVAL TESTIS,RADICAL Left 1/20/2020    Procedure: LEFT RADICAL ORCHIECTOMY INGUINAL, patrial scrotectomy;  Surgeon: Kirsty Riggs MD;  Location: [facility];  Service: Urology     Social History     Objective:  Vitals:    06/22/21 1316   BP: 136/74   BP Location: Right arm   Patient Position: Sitting   Pulse: 77   Resp: 16   Temp: 99 °F (37 2 °C)   TempSrc: Tympanic   SpO2: 98%   Weight: 124 kg (273 lb)   Height: 6' 2" (1 88 m)     Physical Exam  Constitutional:       Appearance: He is well-developed  HENT:      Head: Normocephalic and atraumatic  Eyes:      Pupils: Pupils are equal, round, and reactive to light  Cardiovascular:      Rate and Rhythm: Normal rate and regular rhythm  Heart sounds: No murmur heard  Pulmonary:      Breath sounds: Normal breath sounds  No wheezing or rales  Abdominal:      Palpations: Abdomen is soft  Tenderness: There is no abdominal tenderness  Musculoskeletal:         General: No tenderness  Normal range of motion  Cervical back: Neck supple  Lymphadenopathy:      Cervical: No cervical adenopathy  Skin:     Findings: No erythema or rash  Neurological:      Mental Status: He is alert and oriented to person, place, and time  Cranial Nerves: No cranial nerve deficit  Deep Tendon Reflexes: Reflexes are normal and symmetric  Psychiatric:         Behavior: Behavior normal            Labs: I personally reviewed the labs and imaging pertinent to this patient care

## 2021-08-16 ENCOUNTER — APPOINTMENT (OUTPATIENT)
Dept: LAB | Facility: HOSPITAL | Age: 46
End: 2021-08-16
Attending: INTERNAL MEDICINE
Payer: MEDICARE

## 2021-08-16 DIAGNOSIS — C62.92 TESTICULAR SEMINOMA, LEFT (HCC): ICD-10-CM

## 2021-08-16 LAB
AFP-TM SERPL-MCNC: 5.3 NG/ML (ref 0.5–8)
ALBUMIN SERPL BCP-MCNC: 3.6 G/DL (ref 3.5–5)
ALP SERPL-CCNC: 86 U/L (ref 46–116)
ALT SERPL W P-5'-P-CCNC: 51 U/L (ref 12–78)
ANION GAP SERPL CALCULATED.3IONS-SCNC: 10 MMOL/L (ref 4–13)
AST SERPL W P-5'-P-CCNC: 22 U/L (ref 5–45)
BASOPHILS # BLD AUTO: 0.05 THOUSANDS/ΜL (ref 0–0.1)
BASOPHILS NFR BLD AUTO: 1 % (ref 0–1)
BILIRUB SERPL-MCNC: 0.54 MG/DL (ref 0.2–1)
BUN SERPL-MCNC: 20 MG/DL (ref 5–25)
CALCIUM SERPL-MCNC: 8.8 MG/DL (ref 8.3–10.1)
CHLORIDE SERPL-SCNC: 105 MMOL/L (ref 100–108)
CO2 SERPL-SCNC: 24 MMOL/L (ref 21–32)
CREAT SERPL-MCNC: 1.19 MG/DL (ref 0.6–1.3)
EOSINOPHIL # BLD AUTO: 0.12 THOUSAND/ΜL (ref 0–0.61)
EOSINOPHIL NFR BLD AUTO: 1 % (ref 0–6)
ERYTHROCYTE [DISTWIDTH] IN BLOOD BY AUTOMATED COUNT: 14.1 % (ref 11.6–15.1)
GFR SERPL CREATININE-BSD FRML MDRD: 74 ML/MIN/1.73SQ M
GLUCOSE P FAST SERPL-MCNC: 110 MG/DL (ref 65–99)
HCT VFR BLD AUTO: 41.4 % (ref 36.5–49.3)
HGB BLD-MCNC: 13.7 G/DL (ref 12–17)
IMM GRANULOCYTES # BLD AUTO: 0.02 THOUSAND/UL (ref 0–0.2)
IMM GRANULOCYTES NFR BLD AUTO: 0 % (ref 0–2)
LYMPHOCYTES # BLD AUTO: 2.68 THOUSANDS/ΜL (ref 0.6–4.47)
LYMPHOCYTES NFR BLD AUTO: 30 % (ref 14–44)
MCH RBC QN AUTO: 30.6 PG (ref 26.8–34.3)
MCHC RBC AUTO-ENTMCNC: 33.1 G/DL (ref 31.4–37.4)
MCV RBC AUTO: 92 FL (ref 82–98)
MONOCYTES # BLD AUTO: 0.61 THOUSAND/ΜL (ref 0.17–1.22)
MONOCYTES NFR BLD AUTO: 7 % (ref 4–12)
NEUTROPHILS # BLD AUTO: 5.46 THOUSANDS/ΜL (ref 1.85–7.62)
NEUTS SEG NFR BLD AUTO: 61 % (ref 43–75)
NRBC BLD AUTO-RTO: 0 /100 WBCS
PLATELET # BLD AUTO: 294 THOUSANDS/UL (ref 149–390)
PMV BLD AUTO: 9.3 FL (ref 8.9–12.7)
POTASSIUM SERPL-SCNC: 3.7 MMOL/L (ref 3.5–5.3)
PROT SERPL-MCNC: 7.2 G/DL (ref 6.4–8.2)
RBC # BLD AUTO: 4.48 MILLION/UL (ref 3.88–5.62)
SODIUM SERPL-SCNC: 139 MMOL/L (ref 136–145)
WBC # BLD AUTO: 8.94 THOUSAND/UL (ref 4.31–10.16)

## 2021-08-16 PROCEDURE — 36415 COLL VENOUS BLD VENIPUNCTURE: CPT

## 2021-08-16 PROCEDURE — 82105 ALPHA-FETOPROTEIN SERUM: CPT

## 2021-08-16 PROCEDURE — 85025 COMPLETE CBC W/AUTO DIFF WBC: CPT

## 2021-08-16 PROCEDURE — 80053 COMPREHEN METABOLIC PANEL: CPT

## 2021-08-31 ENCOUNTER — OFFICE VISIT (OUTPATIENT)
Dept: HEMATOLOGY ONCOLOGY | Facility: CLINIC | Age: 46
End: 2021-08-31
Payer: MEDICARE

## 2021-08-31 VITALS
DIASTOLIC BLOOD PRESSURE: 84 MMHG | TEMPERATURE: 97.7 F | HEART RATE: 77 BPM | RESPIRATION RATE: 16 BRPM | SYSTOLIC BLOOD PRESSURE: 126 MMHG | BODY MASS INDEX: 35.98 KG/M2 | WEIGHT: 280.2 LBS | OXYGEN SATURATION: 96 %

## 2021-08-31 DIAGNOSIS — C62.92 TESTICULAR SEMINOMA, LEFT (HCC): Primary | ICD-10-CM

## 2021-08-31 DIAGNOSIS — G89.29 CHRONIC PAIN OF RIGHT HIP: ICD-10-CM

## 2021-08-31 DIAGNOSIS — M25.551 CHRONIC PAIN OF RIGHT HIP: ICD-10-CM

## 2021-08-31 PROCEDURE — 99213 OFFICE O/P EST LOW 20 MIN: CPT | Performed by: INTERNAL MEDICINE

## 2021-08-31 NOTE — PROGRESS NOTES
[facility] [address] [place] [address]  [phone]    Mp Herrera,1975, [id number]  08/31/21    Discussion:   Summary, this is a 43-year-old male history seminoma  He had resection followed by observation  He recurred in less than a year  He was treated with chemotherapy early 2021  Clinically he is stable  He has chronic right hip pain  He states this does improve with nonsteroidals or Tylenol on a p r n  Basis  Most recent imaging was 2 5 months ago showing no evidence recurrent disease  Recent CBC, chemistry, AFP are normal   AFP was never abnormal in his case  Observation remains appropriate  We will see him back in 3 months with blood work and imaging per NCCN recommendations  I discussed the above with the patient  The patient  voiced understanding and agreement   ______________________________________________________________________    Chief Complaint   Patient presents with    Follow-up       HPI:  Oncology History Overview Note   Patient presents today for radiation consult for testicular cancer, referred by Dr Kanwal Lopez  38year old male with a history of developmental delay,  shunt placed at birth  He was evaluated by his PCP in January 2020 for scrotal swelling  US revealed left testicular mass, he was referred to Urology  1/15/20 US scrotum and testicles  IMPRESSION:   Markedly enlarged and abnormal left testicle with normal vascular flow on Doppler interrogation  The finding raises the suspicion of infiltrating process and possibly testicular malignancy (perhaps testicular lymphoma) especially given the history of slowly progressive testicular swelling  FINDINGS:   TESTES:    RIGHT testis = 4 5 x 1 7 x 2 8 cm   The right testicle demonstrates normal contour with homogeneous smooth echotexture    No right-sided intratesticular mass lesion or calcifications    LEFT testis = 15 1 x 9 7 x 14 0 cm  The left testicle is markedly enlarged with profoundly heterogeneous echotexture      1/17/20 Urology, Dr Brown Roberts for left radical orchiectomy and staging imaging  1/20/20 Left radical orchiectomy     2/3/20 Urology follow-up, Dr Dana Salmeron  Reviewed pathology, left testicular seminoma, 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  Scrotal skin was negative for tumor involvement, no tumor was seen at the spermatic cord or the spermatic cord margin, and no penetration through the tunica vaginalis was seen  Plan for CT abd/pelvis and chest xray, recheck lab work  Follow-up to review and referral at that time  2/5/20 Bloodwork:  AFP tumor marker: 5 0  HCG tumor marker: < 1  HCG, Quantitative: < 0 6      2/7/20 CT Abd/Pelvis  IMPRESSION:   1   No evidence of metastatic disease in the abdomen or pelvis    2   Large collection in the left hemiscrotum with central hyperdensity likely representing hematoma  Clinical correlation recommended    3   Postoperative stranding in the left inguinal region with prominent lymph nodes which are likely reactive    4   Cholelithiasis  2/7/20 XR chest pa & lateral  IMPRESSION:   No acute cardiopulmonary disease  2/17/20 Urology f/u with Dr Dana Salmeron  CT negative for metastases, HCG and AFP markers are negative  Refer to Radiation Oncology for consideration of hockey-stick adjuvant radiation therapy  Scrotal hematoma mostly resolved, no signs of infection, follow-up in 3 months  2/18/20 Multidisciplinary Urology Case Review  Physician Recommended Plan:  Referral to Radiation Oncology and Medical Oncology        2/21/20 Dr Antonio Minor  Favor observation  Prophylactic radiation therapy or chemotherapy are reasonable to consider  Reviewed that likelihood of cure is high, observation is reasonable with a goal of avoiding unnecessary treatment  If recurrent, salvage is generally quite effective  Pt and mother prefer avoiding therapy unless necessary    Plan for follow-up in May with repeat imaging and bloodwork to initiate surveillance  5/8/20 CT abd/pelvis and CXR  5/12/20 Dr Yu [facility] Urology follow-up  5/15/20 Tamar Guardian Dr Kendall Hodges follow-up           Testicular seminoma, left [facility])   1/2020 Initial Diagnosis    Testicular seminoma, left ([address] )     1/20/2020 Surgery    Left radical orchiectomy inguinal, patrial scrotectomy (Left)-combination of inguinal and scrotal approach due to size     A  Left testis and spermatic cord:  - Seminoma, 15 5 cm in greatest dimension, with extension beyond tunica albuginea as evidenced by tumor in epididymis and in hilar fat  No penetration through tunica vaginalis is appreciated  - No tumor seen within spermatic cord or at spermatic cord margin  - See synoptic report below     B   Scrotal skin:  - No tumor seen         1/18/2021 - 4/25/2021 Chemotherapy    pegfilgrastim (NEULASTA ONPRO), 6 mg, Subcutaneous, Once, 4 of 4 cycles  Administration: 6 mg (1/29/2021), 6 mg (2/22/2021), 6 mg (3/12/2021), 6 mg (4/9/2021)  CISplatin (PLATINOL) infusion, 48 8 mg, Intravenous, Once, 4 of 4 cycles  Administration: 50 mg (1/18/2021), 50 mg (1/19/2021), 50 mg (2/15/2021), 50 mg (1/20/2021), 50 mg (1/28/2021), 50 mg (1/29/2021), 50 mg (2/16/2021), 50 mg (3/8/2021), 50 mg (2/17/2021), 50 mg (2/18/2021), 50 mg (2/22/2021), 50 mg (3/9/2021), 50 mg (4/5/2021), 50 mg (3/10/2021), 50 mg (3/11/2021), 50 mg (3/12/2021), 50 mg (4/9/2021), 50 mg (4/8/2021), 50 mg (4/6/2021), 50 mg (4/7/2021)  fosaprepitant (EMEND) IVPB, 150 mg, Intravenous, Once, 4 of 4 cycles  Administration: 150 mg (1/18/2021), 150 mg (2/15/2021), 150 mg (3/8/2021), 150 mg (4/5/2021), 150 mg (1/28/2021)  etoposide (TOPOSAR) in NSS infusion, 100 mg/m2 = 244 mg, Intravenous, Once, 4 of 4 cycles  Administration: 244 mg (1/18/2021), 244 mg (1/19/2021), 244 mg (2/15/2021), 244 mg (1/20/2021), 244 mg (1/28/2021), 244 mg (1/29/2021), 244 mg (2/16/2021), 244 mg (3/8/2021), 244 mg (2/17/2021), 244 mg (2/18/2021), 244 mg (2/22/2021), 244 mg (3/9/2021), 244 mg (4/5/2021), 244 mg (3/10/2021), 244 mg (3/11/2021), 244 mg (3/12/2021), 244 mg (4/9/2021), 244 mg (4/8/2021), 244 mg (4/6/2021), 244 mg (4/7/2021)         Interval History:  Clinically stable  ECOG-  1 - Symptomatic but completely ambulatory    Review of Systems   Constitutional: Negative for chills and fever  HENT: Negative for nosebleeds  Eyes: Negative for discharge  Respiratory: Negative for cough and shortness of breath  Cardiovascular: Negative for chest pain  Gastrointestinal: Negative for abdominal pain, constipation and diarrhea  Endocrine: Negative for polydipsia  Genitourinary: Negative for hematuria  Musculoskeletal: Negative for arthralgias  Skin: Negative for color change  Allergic/Immunologic: Negative for immunocompromised state  Neurological: Negative for dizziness and headaches  Hematological: Negative for adenopathy  Psychiatric/Behavioral: Negative for agitation         Past Medical History:   Diagnosis Date    Cleft hard palate     Mass of left testicle     Se[facility] [address] [facility]     Testicular mass 1/16/2020    Added automatically from request for surgery [id number]     Patient Active Problem List   Diagnosis    Mentally challenged    Tobacco abuse    Nonepileptic episode ([facility] )    S/P ventriculoperitoneal shunt    Chronic pain of right hip    Arachnoid cyst    Testicular seminoma, left ([facility] [facility] )    Prevention of chemotherapy-induced neutropenia    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)       Current Outpatient Medications:     magnesium oxide (MAG-OX) 400 mg, Take 1 tablet (400 mg total) by mouth 2 (two) times a day, Disp: 60 tablet, Rfl: 0    meloxicam (MOBIC) 15 mg tablet, Take 1 tablet (15 mg total) by mouth daily, Disp: 90 tablet, Rfl: 3    ondansetron (ZOFRAN) 8 mg tablet, Take 1 tablet (8 mg total) by mouth every 8 (eight) hours as needed for nausea or vomiting, Disp: 20 tablet, Rfl: 2  No Known Allergies  Past Surgical History:   Procedure Laterality Date    CLEFT LIP REPAIR      IR BIOPSY RETROPERITONEUM  12/22/2020    ORCHIECTOMY Left     [place] REMOVAL TESTIS,RADICAL Left 1/20/2020    Procedure: LEFT RADICAL ORCHIECTOMY INGUINAL, patrial scrotectomy;  Surgeon: Stephania Shore MD;  Location: [facility];  Service: Urology     Social History     Objective:  Vitals:    08/31/21 1301   BP: 126/84   BP Location: Left arm   Patient Position: Sitting   Pulse: 77   Resp: 16   Temp: 97 7 °F (36 5 °C)   TempSrc: Tympanic   SpO2: 96%   Weight: 127 kg (280 lb 3 2 oz)     Physical Exam  Constitutional:       Appearance: He is well-developed  HENT:      Head: Normocephalic and atraumatic  Eyes:      Pupils: Pupils are equal, round, and reactive to light  Cardiovascular:      Rate and Rhythm: Normal rate and regular rhythm  Heart sounds: No murmur heard  Pulmonary:      Breath sounds: Normal breath sounds  No wheezing or rales  Abdominal:      Palpations: Abdomen is soft  Tenderness: There is no abdominal tenderness  Musculoskeletal:         General: No tenderness  Normal range of motion  Cervical back: Neck supple  Lymphadenopathy:      Cervical: No cervical adenopathy  Skin:     Findings: No erythema or rash  Neurological:      Mental Status: He is alert and oriented to person, place, and time  Cranial Nerves: No cranial nerve deficit  Deep Tendon Reflexes: Reflexes are normal and symmetric  Psychiatric:         Behavior: Behavior normal            Labs: I personally reviewed the labs and imaging pertinent to this patient care

## 2021-11-16 ENCOUNTER — APPOINTMENT (OUTPATIENT)
Dept: LAB | Facility: HOSPITAL | Age: 46
End: 2021-11-16
Attending: INTERNAL MEDICINE
Payer: MEDICARE

## 2021-11-16 DIAGNOSIS — C62.92 TESTICULAR SEMINOMA, LEFT (HCC): ICD-10-CM

## 2021-11-16 LAB
AFP-TM SERPL-MCNC: 5.7 NG/ML (ref 0.5–8)
ALBUMIN SERPL BCP-MCNC: 3.9 G/DL (ref 3.5–5)
ALP SERPL-CCNC: 95 U/L (ref 46–116)
ALT SERPL W P-5'-P-CCNC: 59 U/L (ref 12–78)
ANION GAP SERPL CALCULATED.3IONS-SCNC: 9 MMOL/L (ref 4–13)
AST SERPL W P-5'-P-CCNC: 29 U/L (ref 5–45)
BASOPHILS # BLD AUTO: 0.06 THOUSANDS/ΜL (ref 0–0.1)
BASOPHILS NFR BLD AUTO: 1 % (ref 0–1)
BILIRUB SERPL-MCNC: 0.98 MG/DL (ref 0.2–1)
BUN SERPL-MCNC: 15 MG/DL (ref 5–25)
CALCIUM SERPL-MCNC: 9.3 MG/DL (ref 8.3–10.1)
CHLORIDE SERPL-SCNC: 101 MMOL/L (ref 100–108)
CO2 SERPL-SCNC: 28 MMOL/L (ref 21–32)
CREAT SERPL-MCNC: 1.17 MG/DL (ref 0.6–1.3)
EOSINOPHIL # BLD AUTO: 0.17 THOUSAND/ΜL (ref 0–0.61)
EOSINOPHIL NFR BLD AUTO: 2 % (ref 0–6)
ERYTHROCYTE [DISTWIDTH] IN BLOOD BY AUTOMATED COUNT: 13.6 % (ref 11.6–15.1)
GFR SERPL CREATININE-BSD FRML MDRD: 75 ML/MIN/1.73SQ M
GLUCOSE P FAST SERPL-MCNC: 109 MG/DL (ref 65–99)
HCT VFR BLD AUTO: 46.8 % (ref 36.5–49.3)
HGB BLD-MCNC: 15.6 G/DL (ref 12–17)
IMM GRANULOCYTES # BLD AUTO: 0.04 THOUSAND/UL (ref 0–0.2)
IMM GRANULOCYTES NFR BLD AUTO: 0 % (ref 0–2)
LYMPHOCYTES # BLD AUTO: 3.67 THOUSANDS/ΜL (ref 0.6–4.47)
LYMPHOCYTES NFR BLD AUTO: 37 % (ref 14–44)
MCH RBC QN AUTO: 31 PG (ref 26.8–34.3)
MCHC RBC AUTO-ENTMCNC: 33.3 G/DL (ref 31.4–37.4)
MCV RBC AUTO: 93 FL (ref 82–98)
MONOCYTES # BLD AUTO: 0.76 THOUSAND/ΜL (ref 0.17–1.22)
MONOCYTES NFR BLD AUTO: 8 % (ref 4–12)
NEUTROPHILS # BLD AUTO: 5.24 THOUSANDS/ΜL (ref 1.85–7.62)
NEUTS SEG NFR BLD AUTO: 52 % (ref 43–75)
NRBC BLD AUTO-RTO: 0 /100 WBCS
PLATELET # BLD AUTO: 312 THOUSANDS/UL (ref 149–390)
PMV BLD AUTO: 9.7 FL (ref 8.9–12.7)
POTASSIUM SERPL-SCNC: 3.7 MMOL/L (ref 3.5–5.3)
PROT SERPL-MCNC: 7.6 G/DL (ref 6.4–8.2)
RBC # BLD AUTO: 5.04 MILLION/UL (ref 3.88–5.62)
SODIUM SERPL-SCNC: 138 MMOL/L (ref 136–145)
WBC # BLD AUTO: 9.94 THOUSAND/UL (ref 4.31–10.16)

## 2021-11-16 PROCEDURE — 82105 ALPHA-FETOPROTEIN SERUM: CPT

## 2021-11-16 PROCEDURE — 85025 COMPLETE CBC W/AUTO DIFF WBC: CPT

## 2021-11-16 PROCEDURE — 80053 COMPREHEN METABOLIC PANEL: CPT

## 2021-11-16 PROCEDURE — 36415 COLL VENOUS BLD VENIPUNCTURE: CPT

## 2021-11-23 ENCOUNTER — HOSPITAL ENCOUNTER (OUTPATIENT)
Dept: CT IMAGING | Facility: HOSPITAL | Age: 46
Discharge: HOME | End: 2021-11-23
Attending: INTERNAL MEDICINE
Payer: MEDICARE

## 2021-11-23 ENCOUNTER — HOSPITAL ENCOUNTER (OUTPATIENT)
Dept: RADIOLOGY | Facility: HOSPITAL | Age: 46
Discharge: HOME | End: 2021-11-23
Attending: INTERNAL MEDICINE
Payer: MEDICARE

## 2021-11-23 DIAGNOSIS — C62.92 TESTICULAR SEMINOMA, LEFT (HCC): ICD-10-CM

## 2021-11-23 PROCEDURE — 74177 CT ABD & PELVIS W/CONTRAST: CPT

## 2021-11-23 PROCEDURE — 71046 X-RAY EXAM CHEST 2 VIEWS: CPT

## 2021-11-23 PROCEDURE — G1004 CDSM NDSC: HCPCS

## 2021-11-23 RX ADMIN — IOHEXOL 100 ML: 350 INJECTION, SOLUTION INTRAVENOUS at 11:25

## 2021-12-03 ENCOUNTER — OFFICE VISIT (OUTPATIENT)
Dept: HEMATOLOGY ONCOLOGY | Facility: CLINIC | Age: 46
End: 2021-12-03
Payer: MEDICARE

## 2021-12-03 VITALS
HEIGHT: 74 IN | SYSTOLIC BLOOD PRESSURE: 144 MMHG | HEART RATE: 76 BPM | BODY MASS INDEX: 36.06 KG/M2 | TEMPERATURE: 99.3 F | WEIGHT: 281 LBS | DIASTOLIC BLOOD PRESSURE: 87 MMHG

## 2021-12-03 DIAGNOSIS — C62.92 TESTICULAR SEMINOMA, LEFT (HCC): Primary | ICD-10-CM

## 2021-12-03 PROCEDURE — 99213 OFFICE O/P EST LOW 20 MIN: CPT | Performed by: INTERNAL MEDICINE

## 2022-02-15 ENCOUNTER — TELEPHONE (OUTPATIENT)
Dept: HEMATOLOGY ONCOLOGY | Facility: MEDICAL CENTER | Age: 47
End: 2022-02-15

## 2022-02-15 NOTE — TELEPHONE ENCOUNTER
Unable to contact patient on [facility] numbers listed  Will send letter to address on file notifying the patient the new appointment details : Tuesday 6/14/2022 @ 2:00 PM in the [place] office

## 2022-04-27 ENCOUNTER — TELEPHONE (OUTPATIENT)
Dept: INTERNAL MEDICINE CLINIC | Facility: CLINIC | Age: 47
End: 2022-04-27

## 2022-05-16 ENCOUNTER — OFFICE VISIT (OUTPATIENT)
Dept: INTERNAL MEDICINE CLINIC | Facility: CLINIC | Age: 47
End: 2022-05-16
Payer: MEDICARE

## 2022-05-16 VITALS
HEART RATE: 88 BPM | OXYGEN SATURATION: 98 % | SYSTOLIC BLOOD PRESSURE: 136 MMHG | HEIGHT: 74 IN | WEIGHT: 296.8 LBS | TEMPERATURE: 100.1 F | DIASTOLIC BLOOD PRESSURE: 88 MMHG | BODY MASS INDEX: 38.09 KG/M2

## 2022-05-16 DIAGNOSIS — C77.2 SECONDARY AND UNSPECIFIED MALIGNANT NEOPLASM OF INTRA-ABDOMINAL LYMPH NODES (HCC): ICD-10-CM

## 2022-05-16 DIAGNOSIS — Z72.0 TOBACCO ABUSE: Chronic | ICD-10-CM

## 2022-05-16 DIAGNOSIS — F79 MENTALLY CHALLENGED: Chronic | ICD-10-CM

## 2022-05-16 DIAGNOSIS — Z12.11 SCREENING FOR COLORECTAL CANCER: ICD-10-CM

## 2022-05-16 DIAGNOSIS — Z13.1 SCREENING FOR DIABETES MELLITUS (DM): ICD-10-CM

## 2022-05-16 DIAGNOSIS — Z00.00 MEDICARE ANNUAL WELLNESS VISIT, SUBSEQUENT: ICD-10-CM

## 2022-05-16 DIAGNOSIS — Z13.220 SCREENING FOR LIPID DISORDERS: ICD-10-CM

## 2022-05-16 DIAGNOSIS — Z13.0 SCREENING FOR DEFICIENCY ANEMIA: ICD-10-CM

## 2022-05-16 DIAGNOSIS — R79.9 ABNORMAL FINDING OF BLOOD CHEMISTRY, UNSPECIFIED: ICD-10-CM

## 2022-05-16 DIAGNOSIS — Z12.11 SCREENING FOR COLON CANCER: Primary | ICD-10-CM

## 2022-05-16 DIAGNOSIS — E83.42 HYPOMAGNESEMIA: ICD-10-CM

## 2022-05-16 DIAGNOSIS — Z13.29 SCREENING FOR THYROID DISORDER: ICD-10-CM

## 2022-05-16 DIAGNOSIS — C62.92 TESTICULAR SEMINOMA, LEFT (HCC): ICD-10-CM

## 2022-05-16 DIAGNOSIS — Z12.12 SCREENING FOR COLORECTAL CANCER: ICD-10-CM

## 2022-05-16 DIAGNOSIS — Z11.59 NEED FOR HEPATITIS C SCREENING TEST: ICD-10-CM

## 2022-05-16 DIAGNOSIS — R03.0 ELEVATED BP WITHOUT DIAGNOSIS OF HYPERTENSION: ICD-10-CM

## 2022-05-16 DIAGNOSIS — R56.9 NONEPILEPTIC EPISODE (HCC): ICD-10-CM

## 2022-05-16 PROCEDURE — G0439 PPPS, SUBSEQ VISIT: HCPCS | Performed by: INTERNAL MEDICINE

## 2022-05-16 PROCEDURE — 99214 OFFICE O/P EST MOD 30 MIN: CPT | Performed by: INTERNAL MEDICINE

## 2022-05-16 NOTE — PROGRESS NOTES
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)    Relevant Orders    CBC and differential    Comprehensive metabolic panel       Genitourinary    Testicular seminoma, left ([facility] [address] )       Other    Tobacco abuse (Chronic)    Mentally challenged (Chronic)    Relevant Orders    Hemoglobin A1C    [facility] )      Other Visit Diagnoses     Screening for colon cancer    -  Primary    Relevant Orders    Ambulatory referral for colonoscopy    Screening for colorectal cancer        Screening for lipid disorders        Relevant Orders    Lipid Panel with Direct LDL reflex    Screening for thyroid disorder        Relevant Orders    TSH, 3rd generation with Free T4 reflex    Screening for deficiency anemia        Relevant Orders    CBC and differential    Screening for diabetes mellitus (DM)        Relevant Orders    Hemoglobin A1C    Hypomagnesemia        Relevant Orders    Magnesium    Abnormal finding of blood chemistry, unspecified         Relevant Orders    Hemoglobin A1C    Medicare annual wellness visit, subsequent        Need for hepatitis C screening test        Relevant Orders    Hepatitis C antibody    Elevated BP without diagnosis of hypertension               Preventive health issues were discussed with patient, and age appropriate screening tests were ordered as noted in patient's After Visit Summary  Personalized health advice and appropriate referrals for health education or preventive services given if needed, as noted in patient's After Visit Summary       History of Present Illness:     Patient presents for Medicare Annual Wellness visit    Patient Care Team:  Mary Palma DO as PCP - General (Internal Medicine)     Problem List:     Patient Active Problem List   Diagnosis    Mentally challenged    Tobacco abuse    [facility]pileptic episode ([facility] )    S/P ventriculoperitoneal shunt    Chronic pain of right hip    Arachnoid cyst    Testicular seminoma, left (HCC)    Prevention of chemotherapy-induced neutropenia    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)      Past Medical and Surgical History:     Past Medical History:   Diagnosis Date    Cleft hard palate     Mass of left testicle     Seizures ([address] )     Testicular mass 1/16/2020    Added automatically from request for surgery [id number]     Past Surgical History:   Procedure Laterality Date    CLEFT LIP REPAIR      IR BIOPSY RETROPERITONEUM  12/22/2020    ORCHIECTOMY Left     [place] REMOVAL TESTIS,RADICAL Left 1/20/2020    Procedure: LEFT RADICAL ORCHIECTOMY INGUINAL, patrial scrotectomy;  Surgeon: Sree Villeda MD;  Location: [facility];  Service: Urology      Family History:     Family History   Problem Relation Age of Onset    Hypertension Mother     COPD Mother     Seizures Father     Heart disease Father     Cancer Family       Social History:     Social History     Socioeconomic History    Marital status: Single     Spouse name: None    Number of children: None    Years of education: None    Highest education level: None   Occupational History    Occupation: disabled   Tobacco Use    Smoking status: Current Every Day Smoker     Packs/day: 0 50     Types: Cigarettes    Smokeless tobacco: Former User   Vaping Use    Vaping Use: Never used   Substance and Sexual Activity    Alcohol use: Yes     Comment: rare    Drug use: Never    Sexual activity: Not Currently   Other Topics Concern    None   Social History Narrative    Disabled    Single    No children    Lives with his mother       Social Determinants of Health     Financial Resource Strain: Not on file   Food Insecurity: Not on file   Transportation Needs: Not on file   Physical Activity: Not on file   Stress: Not on file   Social Connections: Not on file   Intimate Partner Violence: Not on file Housing Stability: Not on file      Medications and Allergies:     Current Outpatient Medications   Medication Sig Dispense Refill    magnesium oxide (MAG-OX) 400 mg Take 1 tablet (400 mg total) by mouth 2 (two) times a day (Patient not taking: Reported on 5/16/2022) 60 tablet 0    meloxicam (MOBIC) 15 mg tablet Take 1 tablet (15 mg total) by mouth daily (Patient not taking: Reported on 5/16/2022) 90 tablet 3    ondansetron (ZOFRAN) 8 mg tablet Take 1 tablet (8 mg total) by mouth every 8 (eight) hours as needed for nausea or vomiting (Patient not taking: Reported on 5/16/2022) 20 tablet 2     No current facility-administered medications for this visit  No Known Allergies   Immunizations:     Immunization History   Administered Date(s) Administered    COVID-19 MODERNA VACC 0 5 ML IM 04/16/2021, 05/14/2021    Influenza Injectable, MDCK, Preservative Free, Quadrivalent, 0 5 mL 10/15/2020    Influenza, injectable, quadrivalent, preservative free 0 5 mL 10/28/2019    Tdap 12/10/2019      Health Maintenance:         Topic Date Due    Hepatitis C Screening  Never done    Colorectal Cancer Screening  Never done    HIV Screening  Completed         Topic Date Due    Pneumococcal Vaccine: Pediatrics (0 to 5 Years) and At-Risk Patients (6 to 59 Years) (1 - PCV) Never done    DTaP,Tdap,and Td Vaccines (1 - Tdap) 12/10/2019    COVID-19 Vaccine (3 - Moderna risk series) 06/11/2021      Medicare Health Risk Assessment:     /88   Pulse 88   Temp 100 1 °F (37 8 °C) (Tympanic)   Ht 6' 2" (1 88 m)   Wt 135 kg (296 lb 12 8 oz)   SpO2 98%   BMI 38 11 kg/m²      Omar Sherwood is here for his Subsequent Wellness visit  Last Medicare Wellness visit information reviewed, patient interviewed and updates made to the record today  Health Risk Assessment:   Patient rates overall health as excellent  Patient feels that their physical health rating is slightly better  Patient is satisfied with their life   Eyesight was rated as same  Hearing was rated as same  Patient feels that their emotional and mental health rating is same  Patients states they are sometimes angry  Patient states they are sometimes unusually tired/fatigued  Pain experienced in the last 7 days has been some  Patient's pain rating has been 6/10  Patient states that he has experienced no weight loss or gain in last 6 months  Depression Screening:   PHQ-2 Score: 0      Fall Risk Screening: In the past year, patient has experienced: no history of falling in past year      Home Safety:  Patient does not have trouble with stairs inside or outside of their home  Patient has working smoke alarms and has working carbon monoxide detector  Home safety hazards include: none  Nutrition:   Current diet is Regular  Medications:   Patient is currently taking over-the-counter supplements  OTC medications include: see medication list  Patient is able to manage medications  Activities of Daily Living (ADLs)/Instrumental Activities of Daily Living (IADLs):   Walk and transfer into and out of bed and chair?: Yes  Dress and groom yourself?: Yes    Bathe or shower yourself?: Yes    Feed yourself? Yes  Do your laundry/housekeeping?: Yes  Manage your money, pay your bills and track your expenses?: Yes  Make your own meals?: No    Do your own shopping?: Yes    Previous Hospitalizations:   Any hospitalizations or ED visits within the last 12 months?: No      Advance Care Planning:   Living will: No    Durable POA for healthcare: No    Advanced directive: No    Advanced directive counseling given: Yes    Five wishes given: Yes    Patient declined ACP directive: No    End of Life Decisions reviewed with patient: Yes    Provider agrees with end of life decisions: Yes      Cognitive Screening:   Provider or family/friend/caregiver concerned regarding cognition?: Yes    Cognition Comments: Mentally challenged and has a POA      PREVENTIVE SCREENINGS      Cardiovascular Screening:    General: Screening Current    Due for: Lipid Panel      Diabetes Screening:     General: Screening Current    Due for: Blood Glucose      Colorectal Cancer Screening:     General: Screening Not Indicated      Prostate Cancer Screening:    General: Screening Not Indicated      Osteoporosis Screening:    General: Screening Not Indicated      Abdominal Aortic Aneurysm (AAA) Screening:    Risk factors include: tobacco use        General: Screening Not Indicated      Lung Cancer Screening:     General: Screening Not Indicated      Hepatitis C Screening:      Hep C Screening Accepted: Yes      Screening, Brief Intervention, and Referral to Treatment (SBIRT)    Screening  Typical number of drinks in a day: 0  Typical number of drinks in a week: 6  Interpretation: Low risk drinking behavior  Single Item Drug Screening:  How often have you used an illegal drug (including marijuana) or a prescription medication for non-medical reasons in the past year? never    Single Item Drug Screen Score: 0  Interpretation: Negative screen for possible drug use disorder    Other Counseling Topics:   Car/seat belt/driving safety, sunscreen and calcium and vitamin D intake and regular weightbearing exercise  A/P: Doing well and no falls  Denies depression and feels safe at home  Diverse diet  Doesn't drive, but  uses seat belts  No living will or POA  Information give for pt to review  No DME or referrals needed today  RTC in one year for medicare wellness       Deandre Quintana DO

## 2022-05-16 NOTE — PROGRESS NOTES
BMI Counseling: Body mass index is 38 11 kg/m²  The BMI is above normal  Nutrition recommendations include decreasing portion sizes and encouraging healthy choices of fruits and vegetables  Exercise recommendations include moderate physical activity 150 minutes/week  No pharmacotherapy was ordered  Rationale for BMI follow-up plan is due to patient being overweight or obese  Depression Screening and Follow-up Plan: Patient was screened for depression during today's encounter  They screened negative with a PHQ-2 score of 0      Assessment/Plan:  Problem List Items Addressed This Visit        Immune and Lymphatic    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)    Relevant Orders    CBC and differential    Comprehensive metabolic panel       Genitourinary    Testicular seminoma, left ([facility] [address] )       Other    Tobacco abuse (Chronic)    Mentally challenged (Chronic)    Relevant Orders    Hemoglobin A1C    Nonepileptic episode ([facility] )      Other Visit Diagnoses     Screening for colon cancer    -  Primary    Relevant Orders    Ambulatory referral for colonoscopy    Screening for colorectal cancer        Screening for lipid disorders        Relevant Orders    Lipid Panel with Direct LDL reflex    Screening for thyroid disorder        Relevant Orders    TSH, 3rd generation with Free T4 reflex    Screening for deficiency anemia        Relevant Orders    CBC and differential    Screening for diabetes mellitus (DM)        Relevant Orders    Hemoglobin A1C    Hypomagnesemia        Relevant Orders    Magnesium    Abnormal finding of blood chemistry, unspecified         Relevant Orders    Hemoglobin A1C    Medicare annual wellness visit, subsequent        Need for hepatitis C screening test        Relevant Orders    Hepatitis C antibody    Elevated BP without diagnosis of hypertension               Diagnoses and all orders for this visit:    Screening for colon cancer  -     Ambulatory referral for colonoscopy; Future    Screening for colorectal cancer    Testicular seminoma, left (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 [address] )    Mentally challenged  -     Hemoglobin A1C; Future    Screening for lipid disorders  -     Lipid Panel with Direct LDL reflex; Future    Screening for thyroid disorder  -     TSH, 3rd generation with Free T4 reflex; Future    Screening for deficiency anemia  -     CBC and differential; Future    Screening for diabetes mellitus (DM)  -     Hemoglobin A1C; Future    Hypomagnesemia  -     Magnesium; Future    Abnormal finding of blood chemistry, unspecified   -     Hemoglobin A1C; Future    Medicare annual wellness visit, subsequent    Need for hepatitis C screening test  -     Hepatitis C antibody; Future    Elevated BP without diagnosis of hypertension      No problem-specific Assessment & Plan notes found for this encounter  A/P: Doing ok and will check labs  BP initially up, but repeat shows some improvement  Will monitor for now  Discussed BMI and will give info on diet and exercise  Wean tobacco  RTC for f/u six months for routine  Subjective:      Patient ID: Sarita Guevara is a 40 y o  male  WM RTC with his caretaker for f/u Testicular cancer, sz, etc  Doing well and no new issues  Remains active w/o difficulty and no falls  NO sz  Cancer is in remission  Still smoking  Due for labs  The following portions of the patient's history were reviewed and updated as appropriate:   He has a past medical history of Cleft hard palate, Mass of left testicle, Seizures ([facility] ), and Testicular mass (1/16/2020)  ,  does not have any pertinent problems on file  ,   has a past surgical history that includes Cleft lip repair; pr removal testis,radical (Left, 1/20/2020);  Orchiectomy (Left); and IR biopsy retroperitoneum (12/22/2020)  ,  family history includes COPD in his mother; Cancer in his family; Heart disease in his father; Hypertension in his mother; Seizures in his father  ,   reports that he has been smoking cigarettes  He has been smoking about 0 50 packs per day  He has quit using smokeless tobacco  He reports current alcohol use  He reports that he does not use drugs  ,  has No Known Allergies     Current Outpatient Medications   Medication Sig Dispense Refill    magnesium oxide (MAG-OX) 400 mg Take 1 tablet (400 mg total) by mouth 2 (two) times a day (Patient not taking: Reported on 5/16/2022) 60 tablet 0    meloxicam (MOBIC) 15 mg tablet Take 1 tablet (15 mg total) by mouth daily (Patient not taking: Reported on 5/16/2022) 90 tablet 3    ondansetron (ZOFRAN) 8 mg tablet Take 1 tablet (8 mg total) by mouth every 8 (eight) hours as needed for nausea or vomiting (Patient not taking: Reported on 5/16/2022) 20 tablet 2     No current facility-administered medications for this visit  Review of Systems   Constitutional: Negative for activity change, chills, diaphoresis, fatigue and fever  HENT: Negative  Eyes: Negative for visual disturbance  Respiratory: Negative for cough, chest tightness, shortness of breath and wheezing  Cardiovascular: Negative for chest pain, palpitations and leg swelling  Gastrointestinal: Negative for abdominal pain, constipation, diarrhea, nausea and vomiting  Endocrine: Negative for cold intolerance and heat intolerance  Genitourinary: Negative for difficulty urinating, dysuria and frequency  Musculoskeletal: Negative for arthralgias, gait problem and myalgias  Neurological: Negative for dizziness, seizures, syncope, weakness, light-headedness and headaches  Psychiatric/Behavioral: Negative for confusion, dysphoric mood and sleep disturbance  The patient is not nervous/anxious          PHQ-2/9 Depression Screening    Little interest or pleasure in doing things: 0 - not at all  Feeling down, depressed, or hopeless: 0 - not at all  PHQ-2 Score: 0  PHQ-2 Interpretation: Negative depression screen        Objective:  Vitals:    05/16/22 1345 05/16/22 1409   BP: 150/90 136/88   Pulse: 88    Temp: 100 1 °F (37 8 °C)    TempSrc: Tympanic    SpO2: 98%    Weight: 135 kg (296 lb 12 8 oz)    Height: 6' 2" (1 88 m)      Body mass index is 38 11 kg/m²  Physical Exam  Vitals and nursing note reviewed  Constitutional:       General: He is not in acute distress  Appearance: Normal appearance  He is not ill-appearing  HENT:      Head: Normocephalic and atraumatic  Mouth/Throat:      Mouth: Mucous membranes are moist    Eyes:      Extraocular Movements: Extraocular movements intact  Conjunctiva/sclera: Conjunctivae normal       Pupils: Pupils are equal, round, and reactive to light  Neck:      Vascular: No carotid bruit  Cardiovascular:      Rate and Rhythm: Normal rate and regular rhythm  Heart sounds: Normal heart sounds  No friction rub  Pulmonary:      Effort: Pulmonary effort is normal  No respiratory distress  Breath sounds: Normal breath sounds  No wheezing or rales  Abdominal:      General: Bowel sounds are normal  There is no distension  Palpations: Abdomen is soft  Tenderness: There is no abdominal tenderness  Musculoskeletal:      Cervical back: Neck supple  Right lower leg: No edema  Left lower leg: No edema  Neurological:      General: No focal deficit present  Mental Status: He is alert and oriented to person, place, and time  Mental status is at baseline  Psychiatric:         Mood and Affect: Mood normal          Behavior: Behavior normal          Thought Content: Thought content normal          Judgment: Judgment normal          BMI Counseling: Body mass index is 38 11 kg/m²   The BMI is above normal  Nutrition recommendations include reducing portion sizes, decreasing overall calorie intake, reducing intake of saturated fat and trans fat and reducing intake of cholesterol  Exercise recommendations include moderate aerobic physical activity for 150 minutes/week

## 2022-05-16 NOTE — PATIENT INSTRUCTIONS
Medicare Preventive Visit Patient Instructions  Thank you for completing your Welcome to Medicare Visit or Medicare Annual Wellness Visit today  Your next wellness visit will be due in one year (5/17/2023)  The screening/preventive services that you may require over the next 5-10 years are detailed below  Some tests may not apply to you based off risk factors and/or age  Screening tests ordered at today's visit but not completed yet may show as past due  Also, please note that scanned in results may not display below  Preventive Screenings:  Service Recommendations Previous Testing/Comments   Colorectal Cancer Screening  · Colonoscopy    · Fecal Occult Blood Test (FOBT)/Fecal Immunochemical Test (FIT)  · Fecal DNA/Cologuard Test  · Flexible Sigmoidoscopy Age: 54-65 years old   Colonoscopy: every 10 years (May be performed more frequently if at higher risk)  OR  FOBT/FIT: every 1 year  OR  Cologuard: every 3 years  OR  Sigmoidoscopy: every 5 years  Screening may be recommended earlier than age 48 if at higher risk for colorectal cancer  Also, an individualized decision between you and your healthcare provider will decide whether screening between the ages of 74-80 would be appropriate   Colonoscopy: Not on file  FOBT/FIT: Not on file  Cologuard: Not on file  Sigmoidoscopy: Not on file          Prostate Cancer Screening Individualized decision between patient and health care provider in men between ages of 53-78   Medicare will cover every 12 months beginning on the day after your 50th birthday PSA: No results in last 5 years     Screening Not Indicated     Hepatitis C Screening Once for adults born between [facility]  More frequently in patients at high risk for Hepatitis C Hep C Antibody: Not on file        Diabetes Screening 1-2 times per year if you're at risk for diabetes or have pre-diabetes Fasting glucose: 109 mg/dL   A1C: 5 7 %    Screening Current   Cholesterol Screening Once every 5 years if you don't have a lipid disorder  May order more often based on risk factors  Lipid panel: 02/05/2020    Screening Current      Other Preventive Screenings Covered by Medicare:  1  Abdominal Aortic Aneurysm (AAA) Screening: covered once if your at risk  You're considered to be at risk if you have a family history of AAA or a male between the age of 73-68 who smoking at least 100 cigarettes in your lifetime  2  Lung Cancer Screening: covers low dose CT scan once per year if you meet all of the following conditions: (1) Age 50-69; (2) No signs or symptoms of lung cancer; (3) Current smoker or have quit smoking within the last 15 years; (4) You have a tobacco smoking history of at least 30 pack years (packs per day x number of years you smoked); (5) You get a written order from a healthcare provider  3  Glaucoma Screening: covered annually if you're considered high risk: (1) You have diabetes OR (2) Family history of glaucoma OR (3)  aged 48 and older OR (3)  American aged 72 and older  3  Osteoporosis Screening: covered every 2 years if you meet one of the following conditions: (1) Have a vertebral abnormality; (2) On glucocorticoid therapy for more than 3 months; (3) Have primary hyperparathyroidism; (4) On osteoporosis medications and need to assess response to drug therapy  5  HIV Screening: covered annually if you're between the age of 12-76  Also covered annually if you are younger than 13 and older than 72 with risk factors for HIV infection  For pregnant patients, it is covered up to 3 times per pregnancy      Immunizations:  Immunization Recommendations   Influenza Vaccine Annual influenza vaccination during flu season is recommended for all persons aged >= 6 months who do not have contraindications   Pneumococcal Vaccine (Prevnar and Pneumovax)  * Prevnar = PCV13  * Pneumovax = PPSV23 Adults 25-60 years old: 1-3 doses may be recommended based on certain risk factors  Adults 72 years old: Prevnar (PCV13) vaccine recommended followed by Pneumovax (PPSV23) vaccine  If already received PPSV23 since turning 65, then PCV13 recommended at least one year after PPSV23 dose  Hepatitis B Vaccine 3 dose series if at intermediate or high risk (ex: diabetes, end stage renal disease, liver disease)   Tetanus (Td) Vaccine - COST NOT COVERED BY MEDICARE PART B Following completion of primary series, a booster dose should be given every 10 years to maintain immunity against tetanus  Td may also be given as tetanus wound prophylaxis  Tdap Vaccine - COST NOT COVERED BY MEDICARE PART B Recommended at least once for all adults  For pregnant patients, recommended with each pregnancy  Shingles Vaccine (Shingrix) - COST NOT COVERED BY MEDICARE PART B  2 shot series recommended in those aged 48 and above     Health Maintenance Due:      Topic Date Due    Hepatitis C Screening  Never done    Colorectal Cancer Screening  Never done    HIV Screening  Completed     Immunizations Due:      Topic Date Due    Pneumococcal Vaccine: Pediatrics (0 to 5 Years) and At-Risk Patients (6 to 59 Years) (1 - PCV) Never done    DTaP,Tdap,and Td Vaccines (1 - Tdap) 12/10/2019    COVID-19 Vaccine (3 - Moderna risk series) 06/11/2021     Advance Directives   What are advance directives? Advance directives are legal documents that state your wishes and plans for medical care  These plans are made ahead of time in case you lose your ability to make decisions for yourself  Advance directives can apply to any medical decision, such as the treatments you want, and if you want to donate organs  What are the types of advance directives? There are many types of advance directives, and each state has rules about how to use them  You may choose a combination of any of the following:  · Living will: This is a written record of the treatment you want   You can also choose which treatments you do not want, which to limit, and which to stop at a certain time  This includes surgery, medicine, IV fluid, and tube feedings  · Durable power of  for healthcare [facility]): This is a written record that states who you want to make healthcare choices for you when you are unable to make them for yourself  This person, called a proxy, is usually a family member or a friend  You may choose more than 1 proxy  · Do not resuscitate (DNR) order:  A DNR order is used in case your heart stops beating or you stop breathing  It is a request not to have certain forms of treatment, such as CPR  A DNR order may be included in other types of advance directives  · Medical directive: This covers the care that you want if you are in a coma, near death, or unable to make decisions for yourself  You can list the treatments you want for each condition  Treatment may include pain medicine, surgery, blood transfusions, dialysis, IV or tube feedings, and a ventilator (breathing machine)  · Values history: This document has questions about your views, beliefs, and how you feel and think about life  This information can help others choose the care that you would choose  Why are advance directives important? An advance directive helps you control your care  Although spoken wishes may be used, it is better to have your wishes written down  Spoken wishes can be misunderstood, or not followed  Treatments may be given even if you do not want them  An advance directive may make it easier for your family to make difficult choices about your care  Cigarette Smoking and Your Health   Risks to your health if you smoke:  Nicotine and other chemicals found in tobacco damage every cell in your body  Even if you are a light smoker, you have an increased risk for cancer, heart disease, and lung disease  If you are pregnant or have diabetes, smoking increases your risk for complications     Benefits to your health if you stop smoking:   · You decrease respiratory symptoms such as coughing, wheezing, and shortness of breath  · You reduce your risk for cancers of the lung, mouth, throat, kidney, bladder, pancreas, stomach, and cervix  If you already have cancer, you increase the benefits of chemotherapy  You also reduce your risk for cancer returning or a second cancer from developing  · You reduce your risk for heart disease, blood clots, heart attack, and stroke  · You reduce your risk for lung infections, and diseases such as pneumonia, asthma, chronic bronchitis, and emphysema  · Your circulation improves  More oxygen can be delivered to your body  If you have diabetes, you lower your risk for complications, such as kidney, artery, and eye diseases  You also lower your risk for nerve damage  Nerve damage can lead to amputations, poor vision, and blindness  · You improve your body's ability to heal and to fight infections  For more information and support to stop smoking:   · [employer]  Phone: 1- 382 - 413-1417  Web Address: [employer]  Weight Management   Why it is important to manage your weight:  Being overweight increases your risk of health conditions such as heart disease, high blood pressure, type 2 diabetes, and certain types of cancer  It can also increase your risk for osteoarthritis, sleep apnea, and other respiratory problems  Aim for a slow, steady weight loss  Even a small amount of weight loss can lower your risk of health problems  How to lose weight safely:  A safe and healthy way to lose weight is to eat fewer calories and get regular exercise  You can lose up about 1 pound a week by decreasing the number of calories you eat by 500 calories each day  Healthy meal plan for weight management:  A healthy meal plan includes a variety of foods, contains fewer calories, and helps you stay healthy  A healthy meal plan includes the following:  · Eat whole-grain foods more often  A healthy meal plan should contain fiber   Fiber is the part of grains, fruits, and vegetables that is not broken down by your body  Whole-grain foods are healthy and provide extra fiber in your diet  Some examples of whole-grain foods are whole-wheat breads and pastas, oatmeal, brown rice, and bulgur  · Eat a variety of vegetables every day  Include dark, leafy greens such as spinach, kale, jonh greens, and mustard greens  Eat yellow and orange vegetables such as carrots, sweet potatoes, and winter squash  · Eat a variety of fruits every day  Choose fresh or canned fruit (canned in its own juice or light syrup) instead of juice  Fruit juice has very little or no fiber  · Eat low-fat dairy foods  Drink fat-free (skim) milk or 1% milk  Eat fat-free yogurt and low-fat cottage cheese  Try low-fat cheeses such as mozzarella and other reduced-fat cheeses  · Choose meat and other protein foods that are low in fat  Choose beans or other legumes such as split peas or lentils  Choose fish, skinless poultry (chicken or turkey), or lean cuts of red meat (beef or pork)  Before you cook meat or poultry, cut off any visible fat  · Use less fat and oil  Try baking foods instead of frying them  Add less fat, such as margarine, sour cream, regular salad dressing and mayonnaise to foods  Eat fewer high-fat foods  Some examples of high-fat foods include french fries, doughnuts, ice cream, and cakes  · Eat fewer sweets  Limit foods and drinks that are high in sugar  This includes candy, cookies, regular soda, and sweetened drinks  Exercise:  Exercise at least 30 minutes per day on most days of the week  Some examples of exercise include walking, biking, dancing, and swimming  You can also fit in more physical activity by taking the stairs instead of the elevator or parking farther away from stores  Ask your healthcare provider about the best exercise plan for you        © Copyright [employer] 2018 Information is for End User's use only and may not be sold, redistributed or otherwise used for commercial purposes  All illustrations and images included in CareNotes® are the copyrighted property of Marcelo CASILLAS  or [facility] Preventive Visit Patient Instructions  Thank you for completing your Welcome to Medicare Visit or Medicare Annual Wellness Visit today  Your next wellness visit will be due in one year (5/17/2023)  The screening/preventive services that you may require over the next 5-10 years are detailed below  Some tests may not apply to you based off risk factors and/or age  Screening tests ordered at today's visit but not completed yet may show as past due  Also, please note that scanned in results may not display below  Preventive Screenings:  Service Recommendations Previous Testing/Comments   Colorectal Cancer Screening  · Colonoscopy    · Fecal Occult Blood Test (FOBT)/Fecal Immunochemical Test (FIT)  · Fecal DNA/Cologuard Test  · Flexible Sigmoidoscopy Age: 54-65 years old   Colonoscopy: every 10 years (May be performed more frequently if at higher risk)  OR  FOBT/FIT: every 1 year  OR  Cologuard: every 3 years  OR  Sigmoidoscopy: every 5 years  Screening may be recommended earlier than age 48 if at higher risk for colorectal cancer  Also, an individualized decision between you and your healthcare provider will decide whether screening between the ages of 74-80 would be appropriate   Colonoscopy: Not on file  FOBT/FIT: Not on file  Cologuard: Not on file  Sigmoidoscopy: Not on file          Prostate Cancer Screening Individualized decision between patient and health care provider in men between ages of 53-78   Medicare will cover every 12 months beginning on the day after your 50th birthday PSA: No results in last 5 years     Screening Not Indicated     Hepatitis C Screening Once for adults born between [facility]  More frequently in patients at high risk for Hepatitis C Hep C Antibody: Not on file        Diabetes Screening 1-2 times per year if you're at risk for diabetes or have pre-diabetes Fasting glucose: 109 mg/dL   A1C: 5 7 %    Screening Current   Cholesterol Screening Once every 5 years if you don't have a lipid disorder  May order more often based on risk factors  Lipid panel: 02/05/2020    Screening Current      Other Preventive Screenings Covered by Medicare:  6  Abdominal Aortic Aneurysm (AAA) Screening: covered once if your at risk  You're considered to be at risk if you have a family history of AAA or a male between the age of 73-68 who smoking at least 100 cigarettes in your lifetime  7  Lung Cancer Screening: covers low dose CT scan once per year if you meet all of the following conditions: (1) Age 50-69; (2) No signs or symptoms of lung cancer; (3) Current smoker or have quit smoking within the last 15 years; (4) You have a tobacco smoking history of at least 30 pack years (packs per day x number of years you smoked); (5) You get a written order from a healthcare provider  8  Glaucoma Screening: covered annually if you're considered high risk: (1) You have diabetes OR (2) Family history of glaucoma OR (3)  aged 48 and older OR (3)  American aged 72 and older  5  Osteoporosis Screening: covered every 2 years if you meet one of the following conditions: (1) Have a vertebral abnormality; (2) On glucocorticoid therapy for more than 3 months; (3) Have primary hyperparathyroidism; (4) On osteoporosis medications and need to assess response to drug therapy  10  HIV Screening: covered annually if you're between the age of 12-76  Also covered annually if you are younger than 13 and older than 72 with risk factors for HIV infection  For pregnant patients, it is covered up to 3 times per pregnancy      Immunizations:  Immunization Recommendations   Influenza Vaccine Annual influenza vaccination during flu season is recommended for all persons aged >= 6 months who do not have contraindications   Pneumococcal Vaccine (Prevnar and Pneumovax)  * Prevnar = PCV13  * Pneumovax = PPSV23 Adults 25-60 years old: 1-3 doses may be recommended based on certain risk factors  Adults 72 years old: Prevnar (PCV13) vaccine recommended followed by Pneumovax (PPSV23) vaccine  If already received PPSV23 since turning 65, then PCV13 recommended at least one year after PPSV23 dose  Hepatitis B Vaccine 3 dose series if at intermediate or high risk (ex: diabetes, end stage renal disease, liver disease)   Tetanus (Td) Vaccine - COST NOT COVERED BY MEDICARE PART B Following completion of primary series, a booster dose should be given every 10 years to maintain immunity against tetanus  Td may also be given as tetanus wound prophylaxis  Tdap Vaccine - COST NOT COVERED BY MEDICARE PART B Recommended at least once for all adults  For pregnant patients, recommended with each pregnancy  Shingles Vaccine (Shingrix) - COST NOT COVERED BY MEDICARE PART B  2 shot series recommended in those aged 48 and above     Health Maintenance Due:      Topic Date Due    Hepatitis C Screening  Never done    Colorectal Cancer Screening  Never done    HIV Screening  Completed     Immunizations Due:      Topic Date Due    Pneumococcal Vaccine: Pediatrics (0 to 5 Years) and At-Risk Patients (6 to 59 Years) (1 - PCV) Never done    DTaP,Tdap,and Td Vaccines (1 - Tdap) 12/10/2019    COVID-19 Vaccine (3 - Moderna risk series) 06/11/2021     Advance Directives   What are advance directives? Advance directives are legal documents that state your wishes and plans for medical care  These plans are made ahead of time in case you lose your ability to make decisions for yourself  Advance directives can apply to any medical decision, such as the treatments you want, and if you want to donate organs  What are the types of advance directives? There are many types of advance directives, and each state has rules about how to use them   You may choose a combination of any of the following:  · Living will: This is a written record of the treatment you want  You can also choose which treatments you do not want, which to limit, and which to stop at a certain time  This includes surgery, medicine, IV fluid, and tube feedings  · Durable power of  for healthcare [facility]): This is a written record that states who you want to make healthcare choices for you when you are unable to make them for yourself  This person, called a proxy, is usually a family member or a friend  You may choose more than 1 proxy  · Do not resuscitate (DNR) order:  A DNR order is used in case your heart stops beating or you stop breathing  It is a request not to have certain forms of treatment, such as CPR  A DNR order may be included in other types of advance directives  · Medical directive: This covers the care that you want if you are in a coma, near death, or unable to make decisions for yourself  You can list the treatments you want for each condition  Treatment may include pain medicine, surgery, blood transfusions, dialysis, IV or tube feedings, and a ventilator (breathing machine)  · Values history: This document has questions about your views, beliefs, and how you feel and think about life  This information can help others choose the care that you would choose  Why are advance directives important? An advance directive helps you control your care  Although spoken wishes may be used, it is better to have your wishes written down  Spoken wishes can be misunderstood, or not followed  Treatments may be given even if you do not want them  An advance directive may make it easier for your family to make difficult choices about your care  Cigarette Smoking and Your Health   Risks to your health if you smoke:  Nicotine and other chemicals found in tobacco damage every cell in your body  Even if you are a light smoker, you have an increased risk for cancer, heart disease, and lung disease   If you are pregnant or have diabetes, smoking increases your risk for complications  Benefits to your health if you stop smoking:   · You decrease respiratory symptoms such as coughing, wheezing, and shortness of breath  · You reduce your risk for cancers of the lung, mouth, throat, kidney, bladder, pancreas, stomach, and cervix  If you already have cancer, you increase the benefits of chemotherapy  You also reduce your risk for cancer returning or a second cancer from developing  · You reduce your risk for heart disease, blood clots, heart attack, and stroke  · You reduce your risk for lung infections, and diseases such as pneumonia, asthma, chronic bronchitis, and emphysema  · Your circulation improves  More oxygen can be delivered to your body  If you have diabetes, you lower your risk for complications, such as kidney, artery, and eye diseases  You also lower your risk for nerve damage  Nerve damage can lead to amputations, poor vision, and blindness  · You improve your body's ability to heal and to fight infections  For more information and support to stop smoking:   · [employer]  Phone: 9- 993 - 295-8992  Web Address: www [employer]  Weight Management   Why it is important to manage your weight:  Being overweight increases your risk of health conditions such as heart disease, high blood pressure, type 2 diabetes, and certain types of cancer  It can also increase your risk for osteoarthritis, sleep apnea, and other respiratory problems  Aim for a slow, steady weight loss  Even a small amount of weight loss can lower your risk of health problems  How to lose weight safely:  A safe and healthy way to lose weight is to eat fewer calories and get regular exercise  You can lose up about 1 pound a week by decreasing the number of calories you eat by 500 calories each day  Healthy meal plan for weight management:  A healthy meal plan includes a variety of foods, contains fewer calories, and helps you stay healthy   A healthy meal plan includes the following:  · Eat whole-grain foods more often  A healthy meal plan should contain fiber  Fiber is the part of grains, fruits, and vegetables that is not broken down by your body  Whole-grain foods are healthy and provide extra fiber in your diet  Some examples of whole-grain foods are whole-wheat breads and pastas, oatmeal, brown rice, and bulgur  · Eat a variety of vegetables every day  Include dark, leafy greens such as spinach, kale, jonh greens, and mustard greens  Eat yellow and orange vegetables such as carrots, sweet potatoes, and winter squash  · Eat a variety of fruits every day  Choose fresh or canned fruit (canned in its own juice or light syrup) instead of juice  Fruit juice has very little or no fiber  · Eat low-fat dairy foods  Drink fat-free (skim) milk or 1% milk  Eat fat-free yogurt and low-fat cottage cheese  Try low-fat cheeses such as mozzarella and other reduced-fat cheeses  · Choose meat and other protein foods that are low in fat  Choose beans or other legumes such as split peas or lentils  Choose fish, skinless poultry (chicken or turkey), or lean cuts of red meat (beef or pork)  Before you cook meat or poultry, cut off any visible fat  · Use less fat and oil  Try baking foods instead of frying them  Add less fat, such as margarine, sour cream, regular salad dressing and mayonnaise to foods  Eat fewer high-fat foods  Some examples of high-fat foods include french fries, doughnuts, ice cream, and cakes  · Eat fewer sweets  Limit foods and drinks that are high in sugar  This includes candy, cookies, regular soda, and sweetened drinks  Exercise:  Exercise at least 30 minutes per day on most days of the week  Some examples of exercise include walking, biking, dancing, and swimming  You can also fit in more physical activity by taking the stairs instead of the elevator or parking farther away from stores   Ask your healthcare provider about the best exercise plan for you  © Copyright [employer] 2018 Information is for End User's use only and may not be sold, redistributed or otherwise used for commercial purposes  All illustrations and images included in CareNotes® are the copyrighted property of [employer]  or Jaciel Smiley  Obesity   AMBULATORY CARE:   Obesity  means your body mass index (BMI) is greater than 30  Your healthcare provider will use your height and weight to measure your BMI  The risks of obesity include  many health problems, including injuries or physical disability  · Diabetes (high blood sugar level)    · High blood pressure or high cholesterol    · Heart disease    · Stroke    · Gallbladder or liver disease    · Cancer of the colon, breast, prostate, liver, or kidney    · Sleep apnea    · Arthritis or gout    Screening  is done to check for health conditions before you have signs or symptoms  If you are 28to 79years old, your blood sugar level may be checked every 3 years for signs of prediabetes or diabetes  Your healthcare provider will check your blood pressure at each visit  High blood pressure can lead to a stroke or other problems  Your provider may check for signs of heart disease, cancer, or other health problems  Seek care immediately if:   · You have a severe headache, confusion, or difficulty speaking  · You have weakness on one side of your body  · You have chest pain, sweating, or shortness of breath  Call your doctor if:   · You have symptoms of gallbladder or liver disease, such as pain in your upper abdomen  · You have knee or hip pain and discomfort while walking  · You have symptoms of diabetes, such as intense hunger and thirst, and frequent urination  · You have symptoms of sleep apnea, such as snoring or daytime sleepiness  · You have questions or concerns about your condition or care  Treatment for obesity  focuses on helping you lose weight to improve your health   Even a small decrease in BMI can reduce the risk for many health problems  Your healthcare provider will help you set a weight-loss goal   · Lifestyle changes  are the first step in treating obesity  These include making healthy food choices and getting regular physical activity  Your healthcare provider may suggest a weight-loss program that involves coaching, education, and therapy  · Medicine  may help you lose weight when it is used with a healthy foods and physical activity  · Surgery  can help you lose weight if you are very obese and have other health problems  There are several types of weight-loss surgery  Ask your healthcare provider for more information  Tips for safe weight loss:   · Set small, realistic goals  An example of a small goal is to walk for 20 minutes 5 days a week  Anther goal is to lose 5% of your body weight  · Tell friends, family members, and coworkers about your goals  and ask for their support  Ask a friend to lose weight with you, or join a weight-loss support group  · Identify foods or triggers that may cause you to overeat , and find ways to avoid them  Remove tempting high-calorie foods from your home and workplace  Place a bowl of fresh fruit on your kitchen counter  If stress causes you to eat, then find other ways to cope with stress  A counselor or therapist may be able to help you  · Keep a diary to track what you eat and drink  Also write down how many minutes of physical activity you do each day  Weigh yourself once a week and record it in your diary  Eating changes: You will need to eat 500 to 1,000 fewer calories each day than you currently eat to lose 1 to 2 pounds a week  The following changes will help you cut calories:  · Eat smaller portions  Use small plates, no larger than 9 inches in diameter  Fill your plate half full of fruits and vegetables  Measure your food using measuring cups until you know what a serving size looks like           · Eat 3 meals and 1 or 2 snacks each day  Plan your meals in advance  Raina Rosenthal and eat at home most of the time  Eat slowly  Do not skip meals  Skipping meals can lead to overeating later in the day  This can make it harder for you to lose weight  Talk with a dietitian to help you make a meal plan and schedule that is right for you  · Eat fruits and vegetables at every meal   They are low in calories and high in fiber, which makes you feel full  Do not add butter, margarine, or cream sauce to vegetables  Use herbs to season steamed vegetables  · Eat less fat and fewer fried foods  Eat more baked or grilled chicken and fish  These protein sources are lower in calories and fat than red meat  Limit fast food  Dress your salads with olive oil and vinegar instead of bottled dressing  · Limit the amount of sugar you eat  Do not drink sugary beverages  Limit alcohol  Activity changes:  Physical activity is good for your body in many ways  It helps you burn calories and build strong muscles  It decreases stress and depression, and improves your mood  It can also help you sleep better  Talk to your healthcare provider before you begin an exercise program   · Exercise for at least 30 minutes 5 days a week  Start slowly  Set aside time each day for physical activity that you enjoy and that is convenient for you  It is best to do both weight training and an activity that increases your heart rate, such as walking, bicycling, or swimming  · Find ways to be more active  Do yard work and housecleaning  Walk up the stairs instead of using elevators  Spend your leisure time going to events that require walking, such as outdoor festivals or fairs  This extra physical activity can help you lose weight and keep it off  Follow up with your doctor as directed: You may need to meet with a dietitian  Write down your questions so you remember to ask them during your visits    © Copyright [employer] 2022 Information is for End User's use only and may not be sold, redistributed or otherwise used for commercial purposes  All illustrations and images included in CareNotes® are the copyrighted property of [employer]  or Jaciel Smiley  The above information is an  only  It is not intended as medical advice for individual conditions or treatments  Talk to your doctor, nurse or pharmacist before following any medical regimen to see if it is safe and effective for you  Low Fat Diet   AMBULATORY CARE:   A low-fat diet  is an eating plan that is low in total fat, unhealthy fat, and cholesterol  You may need to follow a low-fat diet if you have trouble digesting or absorbing fat  You may also need to follow this diet if you have high cholesterol  You can also lower your cholesterol by increasing the amount of fiber in your diet  Soluble fiber is a type of fiber that helps to decrease cholesterol levels  Different types of fat in food:   · Limit unhealthy fats  A diet that is high in cholesterol, saturated fat, and trans fat may cause unhealthy cholesterol levels  Unhealthy cholesterol levels increase your risk of heart disease  ? Cholesterol:  Limit intake of cholesterol to less than 200 mg per day  Cholesterol is found in meat, eggs, and dairy  ? Saturated fat:  Limit saturated fat to less than 7% of your total daily calories  Ask your dietitian how many calories you need each day  Saturated fat is found in butter, cheese, ice cream, whole milk, and palm oil  Saturated fat is also found in meat, such as beef, pork, chicken skin, and processed meats  Processed meats include sausage, hot dogs, and bologna  ? Trans fat:  Avoid trans fat as much as possible  Trans fat is used in fried and baked foods  Foods that say trans fat free on the label may still have up to 0 5 grams of trans fat per serving  · Include healthy fats  Replace foods that are high in saturated and trans fat with foods high in healthy fats  This may help to decrease high cholesterol levels  ? Monounsaturated fats: These are found in avocados, nuts, and vegetable oils, such as olive, canola, and sunflower oil  ? Polyunsaturated fats: These can be found in vegetable oils, such as soybean or corn oil  Omega-3 fats can help to decrease the risk of heart disease  Omega-3 fats are found in fish, such as salmon, herring, trout, and tuna  Omega-3 fats can also be found in plant foods, such as walnuts, flaxseed, soybeans, and canola oil  Foods to limit or avoid:   · Grains:      ? Snacks that are made with partially hydrogenated oils, such as chips, regular crackers, and butter-flavored popcorn    ? High-fat baked goods, such as biscuits, croissants, doughnuts, pies, cookies, and pastries    · Dairy:      ? Whole milk, 2% milk, and yogurt and ice cream made with whole milk    ? Half and half creamer, heavy cream, and whipping cream    ? Cheese, cream cheese, and sour cream    · Meats and proteins:      ? High-fat cuts of meat (T-bone steak, regular hamburger, and ribs)    ? Fried meat, poultry (turkey and chicken), and fish    ? Poultry (chicken and turkey) with skin    ? Cold cuts (salami or bologna), hot dogs, au, and sausage    ? Whole eggs and egg yolks    · Vegetables and fruits with added fat:      ? Fried vegetables or vegetables in butter or high-fat sauces, such as cream or cheese sauces    ? Fried fruit or fruit served with butter or cream    · Fats:      ? Butter, stick margarine, and shortening    ? Coconut, palm oil, and palm kernel oil    Foods to include:   · Grains:      ? Whole-grain breads, cereals, pasta, and brown rice    ? Low-fat crackers and pretzels    · Vegetables and fruits:      ? Fresh, frozen, or canned vegetables (no salt or low-sodium)    ? Fresh, frozen, dried, or canned fruit (canned in light syrup or fruit juice)    ? Avocado    · Low-fat dairy products:      ? Nonfat (skim) or 1% milk    ?  Nonfat or low-fat cheese, yogurt, and cottage cheese    · Meats and proteins:      ? Chicken or turkey with no skin    ? Baked or broiled fish    ? Lean beef and pork (loin, round, extra lean hamburger)    ? Beans and peas, unsalted nuts, soy products    ? Egg whites and substitutes    ? Seeds and nuts    · Fats:      ? Unsaturated oil, such as canola, olive, peanut, soybean, or sunflower oil    ? Soft or liquid margarine and vegetable oil spread    ? Low-fat salad dressing    Other ways to decrease fat:   · Read food labels before you buy foods  Choose foods that have less than 30% of calories from fat  Choose low-fat or fat-free dairy products  Remember that fat free does not mean calorie free  These foods still contain calories, and too many calories can lead to weight gain  · Trim fat from meat and avoid fried food  Trim all visible fat from meat before you cook it  Remove the skin from poultry  Do not reza meat, fish, or poultry  Bake, roast, boil, or broil these foods instead  Avoid fried foods  Eat a baked potato instead of [place] fries  Steam vegetables instead of sautéing them in butter  · Add less fat to foods  Use imitation au bits on salads and baked potatoes instead of regular au bits  Use fat-free or low-fat salad dressings instead of regular dressings  Use low-fat or nonfat butter-flavored topping instead of regular butter or margarine on popcorn and other foods  Ways to decrease fat in recipes:  Replace high-fat ingredients with low-fat or nonfat ones  This may cause baked goods to be drier than usual  You may need to use nonfat cooking spray on pans to prevent food from sticking  You also may need to change the amount of other ingredients, such as water, in the recipe  Try the following:  · Use low-fat or light margarine instead of regular margarine or shortening  · Use lean ground turkey breast or chicken, or lean ground beef (less than 5% fat) instead of hamburger       · Add 1 teaspoon of canola oil to 8 ounces of skim milk instead of using cream or half and half  · Use grated zucchini, carrots, or apples in breads instead of coconut  · Use blenderized, low-fat cottage cheese, plain tofu, or low-fat ricotta cheese instead of cream cheese  · Use 1 egg white and 1 teaspoon of canola oil, or use ¼ cup (2 ounces) of fat-free egg substitute instead of a whole egg  · Replace half of the oil that is called for in a recipe with applesauce when you bake  Use 3 tablespoons of cocoa powder and 1 tablespoon of canola oil instead of a square of baking chocolate  How to increase fiber:  Eat enough high-fiber foods to get 20 to 30 grams of fiber every day  Slowly increase your fiber intake to avoid stomach cramps, gas, and other problems  · Eat 3 ounces of whole-grain foods each day  An ounce is about 1 slice of bread  Eat whole-grain breads, such as whole-wheat bread  Whole wheat, whole-wheat flour, or other whole grains should be listed as the first ingredient on the food label  Replace white flour with whole-grain flour or use half of each in recipes  Whole-grain flour is heavier than white flour, so you may have to add more yeast or baking powder  · Eat a high-fiber cereal for breakfast   Oatmeal is a good source of soluble fiber  Look for cereals that have bran or fiber in the name  Choose whole-grain products, such as brown rice, barley, and whole-wheat pasta  · Eat more beans, peas, and lentils  For example, add beans to soups or salads  Eat at least 5 cups of fruits and vegetables each day  Eat fruits and vegetables with the peel because the peel is high in fiber  © Copyright [employer] 2022 Information is for End User's use only and may not be sold, redistributed or otherwise used for commercial purposes   All illustrations and images included in CareNotes® are the copyrighted property of [employer]  or Jaciel Smiley  The above information is an  only  It is not intended as medical advice for individual conditions or treatments  Talk to your doctor, nurse or pharmacist before following any medical regimen to see if it is safe and effective for you  Heart Healthy Diet   AMBULATORY CARE:   A heart healthy diet  is an eating plan low in unhealthy fats and sodium (salt)  The plan is high in healthy fats and fiber  A heart healthy diet helps improve your cholesterol levels and lowers your risk for heart disease and stroke  A dietitian will teach you how to read and understand food labels  Heart healthy diet guidelines to follow:   · Choose foods that contain healthy fats  ? Unsaturated fats  include monounsaturated and polyunsaturated fats  Unsaturated fat is found in foods such as soybean, canola, olive, corn, and safflower oils  It is also found in soft tub margarine that is made with liquid vegetable oil  ? Omega-3 fat  is found in certain fish, such as salmon, tuna, and trout, and in walnuts and flaxseed  Eat fish high in omega-3 fats at least 2 times a week  · Get 20 to 30 grams of fiber each day  Fruits, vegetables, whole-grain foods, and legumes (cooked beans) are good sources of fiber  · Limit or do not have unhealthy fats  ? Cholesterol  is found in animal foods, such as eggs and lobster, and in dairy products made from whole milk  Limit cholesterol to less than 200 mg each day  ? Saturated fat  is found in meats, such as au and hamburger  It is also found in chicken or turkey skin, whole milk, and butter  Limit saturated fat to less than 7% of your total daily calories  ? Trans fat  is found in packaged foods, such as potato chips and cookies  It is also in hard margarine, some fried foods, and shortening  Do not eat foods that contain trans fats  · Limit sodium as directed  You may be told to limit sodium to 2,000 to 2,300 mg each day  Choose low-sodium or no-salt-added foods   Add little or no salt to food you prepare  Use herbs and spices in place of salt  Include the following in your heart healthy plan:  Ask your dietitian or healthcare provider how many servings to have from each of the following food groups:  · Grains:      ? Whole-wheat breads, cereals, and pastas, and brown rice    ? Low-fat, low-sodium crackers and chips    · Vegetables:      ? Broccoli, green beans, green peas, and spinach    ? Collards, kale, and lima beans    ? Carrots, sweet potatoes, tomatoes, and peppers    ? Canned vegetables with no salt added    · Fruits:      ? Bananas, peaches, pears, and pineapple    ? Grapes, raisins, and dates    ? Oranges, tangerines, grapefruit, orange juice, and grapefruit juice    ? Apricots, mangoes, melons, and papaya    ? Raspberries and strawberries    ? Canned fruit with no added sugar    · Low-fat dairy:      ? Nonfat (skim) milk, 1% milk, and low-fat almond, cashew, or soy milks fortified with calcium    ? Low-fat cheese, regular or frozen yogurt, and cottage cheese    · Meats and proteins:      ? Lean cuts of beef and pork (loin, leg, round), skinless chicken and turkey    ? Legumes, soy products, egg whites, or nuts    Limit or do not include the following in your heart healthy plan:   · Unhealthy fats and oils:      ? Whole or 2% milk, cream cheese, sour cream, or cheese    ? High-fat cuts of beef (T-bone steaks, ribs), chicken or turkey with skin, and organ meats such as liver    ? Butter, stick margarine, shortening, and cooking oils such as coconut or palm oil    · Foods and liquids high in sodium:      ? Packaged foods, such as frozen dinners, cookies, macaroni and cheese, and cereals with more than 300 mg of sodium per serving    ? Vegetables with added sodium, such as instant potatoes, vegetables with added sauces, or regular canned vegetables    ? Cured or smoked meats, such as hot dogs, au, and sausage    ?  High-sodium ketchup, barbecue sauce, salad dressing, pickles, olives, soy sauce, or miso    · Foods and liquids high in sugar:      ? Candy, cake, cookies, pies, or doughnuts    ? Soft drinks (soda), sports drinks, or sweetened tea    ? Canned or dry mixes for cakes, soups, sauces, or gravies    Other healthy heart guidelines:   · Do not smoke  Nicotine and other chemicals in cigarettes and cigars can cause lung and heart damage  Ask your healthcare provider for information if you currently smoke and need help to quit  E-cigarettes or smokeless tobacco still contain nicotine  Talk to your healthcare provider before you use these products  · Limit or do not drink alcohol as directed  Alcohol can damage your heart and raise your blood pressure  Your healthcare provider may give you specific daily and weekly limits  The general recommended limit is 1 drink a day for women 21 or older and for men 72 or older  Do not have more than 3 drinks in a day or 7 in a week  The recommended limit is 2 drinks a day for men 24to 59years of age  Do not have more than 4 drinks in a day or 14 in a week  A drink of alcohol is 12 ounces of beer, 5 ounces of wine, or 1½ ounces of liquor  · Exercise regularly  Exercise can help you maintain a healthy weight and improve your blood pressure and cholesterol levels  Regular exercise can also decrease your risk for heart problems  Ask your healthcare provider about the best exercise plan for you  Do not start an exercise program without asking your healthcare provider  Follow up with your doctor or cardiologist as directed:  Write down your questions so you remember to ask them during your visits  © Copyright [employer] 2022 Information is for End User's use only and may not be sold, redistributed or otherwise used for commercial purposes  All illustrations and images included in CareNotes® are the copyrighted property of [employer]  or [facility] Jessica Vila   The above information is an  only   It is not intended as medical advice for individual conditions or treatments  Talk to your doctor, nurse or pharmacist before following any medical regimen to see if it is safe and effective for you

## 2022-05-19 ENCOUNTER — APPOINTMENT (OUTPATIENT)
Dept: LAB | Facility: HOSPITAL | Age: 47
End: 2022-05-19
Attending: INTERNAL MEDICINE
Payer: MEDICARE

## 2022-05-19 DIAGNOSIS — Z13.0 SCREENING FOR DEFICIENCY ANEMIA: ICD-10-CM

## 2022-05-19 DIAGNOSIS — Z13.29 SCREENING FOR THYROID DISORDER: ICD-10-CM

## 2022-05-19 DIAGNOSIS — Z11.59 NEED FOR HEPATITIS C SCREENING TEST: ICD-10-CM

## 2022-05-19 DIAGNOSIS — F79 MENTALLY CHALLENGED: Chronic | ICD-10-CM

## 2022-05-19 DIAGNOSIS — Z13.1 SCREENING FOR DIABETES MELLITUS (DM): ICD-10-CM

## 2022-05-19 DIAGNOSIS — R79.9 ABNORMAL FINDING OF BLOOD CHEMISTRY, UNSPECIFIED: ICD-10-CM

## 2022-05-19 DIAGNOSIS — E83.42 HYPOMAGNESEMIA: ICD-10-CM

## 2022-05-19 DIAGNOSIS — Z13.220 SCREENING FOR LIPID DISORDERS: ICD-10-CM

## 2022-05-19 DIAGNOSIS — C77.2 SECONDARY AND UNSPECIFIED MALIGNANT NEOPLASM OF INTRA-ABDOMINAL LYMPH NODES (HCC): ICD-10-CM

## 2022-05-19 LAB
ALBUMIN SERPL BCP-MCNC: 3.8 G/DL (ref 3.5–5)
ALP SERPL-CCNC: 103 U/L (ref 46–116)
ALT SERPL W P-5'-P-CCNC: 51 U/L (ref 12–78)
ANION GAP SERPL CALCULATED.3IONS-SCNC: 8 MMOL/L (ref 4–13)
AST SERPL W P-5'-P-CCNC: 22 U/L (ref 5–45)
BASOPHILS # BLD AUTO: 0.04 THOUSANDS/ΜL (ref 0–0.1)
BASOPHILS NFR BLD AUTO: 0 % (ref 0–1)
BILIRUB SERPL-MCNC: 0.87 MG/DL (ref 0.2–1)
BUN SERPL-MCNC: 15 MG/DL (ref 5–25)
CALCIUM SERPL-MCNC: 9.4 MG/DL (ref 8.3–10.1)
CHLORIDE SERPL-SCNC: 103 MMOL/L (ref 100–108)
CHOLEST SERPL-MCNC: 250 MG/DL
CO2 SERPL-SCNC: 27 MMOL/L (ref 21–32)
CREAT SERPL-MCNC: 1.19 MG/DL (ref 0.6–1.3)
EOSINOPHIL # BLD AUTO: 0.12 THOUSAND/ΜL (ref 0–0.61)
EOSINOPHIL NFR BLD AUTO: 1 % (ref 0–6)
ERYTHROCYTE [DISTWIDTH] IN BLOOD BY AUTOMATED COUNT: 14.2 % (ref 11.6–15.1)
EST. AVERAGE GLUCOSE BLD GHB EST-MCNC: 123 MG/DL
GFR SERPL CREATININE-BSD FRML MDRD: 73 ML/MIN/1.73SQ M
GLUCOSE P FAST SERPL-MCNC: 111 MG/DL (ref 65–99)
HBA1C MFR BLD: 5.9 %
HCT VFR BLD AUTO: 47.3 % (ref 36.5–49.3)
HCV AB SER QL: NORMAL
HDLC SERPL-MCNC: 35 MG/DL
HGB BLD-MCNC: 15.6 G/DL (ref 12–17)
IMM GRANULOCYTES # BLD AUTO: 0.03 THOUSAND/UL (ref 0–0.2)
IMM GRANULOCYTES NFR BLD AUTO: 0 % (ref 0–2)
LDLC SERPL CALC-MCNC: 183 MG/DL (ref 0–100)
LYMPHOCYTES # BLD AUTO: 3.04 THOUSANDS/ΜL (ref 0.6–4.47)
LYMPHOCYTES NFR BLD AUTO: 34 % (ref 14–44)
MAGNESIUM SERPL-MCNC: 2.4 MG/DL (ref 1.6–2.6)
MCH RBC QN AUTO: 29.4 PG (ref 26.8–34.3)
MCHC RBC AUTO-ENTMCNC: 33 G/DL (ref 31.4–37.4)
MCV RBC AUTO: 89 FL (ref 82–98)
MONOCYTES # BLD AUTO: 0.59 THOUSAND/ΜL (ref 0.17–1.22)
MONOCYTES NFR BLD AUTO: 7 % (ref 4–12)
NEUTROPHILS # BLD AUTO: 5.24 THOUSANDS/ΜL (ref 1.85–7.62)
NEUTS SEG NFR BLD AUTO: 58 % (ref 43–75)
NRBC BLD AUTO-RTO: 0 /100 WBCS
PLATELET # BLD AUTO: 343 THOUSANDS/UL (ref 149–390)
PMV BLD AUTO: 9.7 FL (ref 8.9–12.7)
POTASSIUM SERPL-SCNC: 3.9 MMOL/L (ref 3.5–5.3)
PROT SERPL-MCNC: 7.6 G/DL (ref 6.4–8.2)
RBC # BLD AUTO: 5.3 MILLION/UL (ref 3.88–5.62)
SODIUM SERPL-SCNC: 138 MMOL/L (ref 136–145)
TRIGL SERPL-MCNC: 162 MG/DL
TSH SERPL DL<=0.05 MIU/L-ACNC: 2.41 UIU/ML (ref 0.45–4.5)
WBC # BLD AUTO: 9.06 THOUSAND/UL (ref 4.31–10.16)

## 2022-05-19 PROCEDURE — 80053 COMPREHEN METABOLIC PANEL: CPT

## 2022-05-19 PROCEDURE — 86803 HEPATITIS C AB TEST: CPT

## 2022-05-19 PROCEDURE — 83735 ASSAY OF MAGNESIUM: CPT

## 2022-05-19 PROCEDURE — 36415 COLL VENOUS BLD VENIPUNCTURE: CPT

## 2022-05-19 PROCEDURE — 85025 COMPLETE CBC W/AUTO DIFF WBC: CPT

## 2022-05-19 PROCEDURE — 83036 HEMOGLOBIN GLYCOSYLATED A1C: CPT

## 2022-05-19 PROCEDURE — 80061 LIPID PANEL: CPT

## 2022-05-19 PROCEDURE — 84443 ASSAY THYROID STIM HORMONE: CPT

## 2022-07-07 ENCOUNTER — APPOINTMENT (OUTPATIENT)
Dept: LAB | Facility: HOSPITAL | Age: 47
End: 2022-07-07
Attending: INTERNAL MEDICINE
Payer: MEDICARE

## 2022-07-07 DIAGNOSIS — C62.92 TESTICULAR SEMINOMA, LEFT (HCC): ICD-10-CM

## 2022-07-07 LAB
AFP-TM SERPL-MCNC: 5.9 NG/ML (ref 0.5–8)
ALBUMIN SERPL BCP-MCNC: 3.9 G/DL (ref 3.5–5)
ALP SERPL-CCNC: 106 U/L (ref 46–116)
ALT SERPL W P-5'-P-CCNC: 52 U/L (ref 12–78)
ANION GAP SERPL CALCULATED.3IONS-SCNC: 10 MMOL/L (ref 4–13)
AST SERPL W P-5'-P-CCNC: 22 U/L (ref 5–45)
BASOPHILS # BLD AUTO: 0.05 THOUSANDS/ΜL (ref 0–0.1)
BASOPHILS NFR BLD AUTO: 1 % (ref 0–1)
BILIRUB SERPL-MCNC: 1.05 MG/DL (ref 0.2–1)
BUN SERPL-MCNC: 18 MG/DL (ref 5–25)
CALCIUM SERPL-MCNC: 9.3 MG/DL (ref 8.3–10.1)
CHLORIDE SERPL-SCNC: 100 MMOL/L (ref 100–108)
CO2 SERPL-SCNC: 25 MMOL/L (ref 21–32)
CREAT SERPL-MCNC: 1.23 MG/DL (ref 0.6–1.3)
EOSINOPHIL # BLD AUTO: 0.07 THOUSAND/ΜL (ref 0–0.61)
EOSINOPHIL NFR BLD AUTO: 1 % (ref 0–6)
ERYTHROCYTE [DISTWIDTH] IN BLOOD BY AUTOMATED COUNT: 14.3 % (ref 11.6–15.1)
GFR SERPL CREATININE-BSD FRML MDRD: 70 ML/MIN/1.73SQ M
GLUCOSE P FAST SERPL-MCNC: 118 MG/DL (ref 65–99)
HCT VFR BLD AUTO: 45.6 % (ref 36.5–49.3)
HGB BLD-MCNC: 15.3 G/DL (ref 12–17)
IMM GRANULOCYTES # BLD AUTO: 0.03 THOUSAND/UL (ref 0–0.2)
IMM GRANULOCYTES NFR BLD AUTO: 0 % (ref 0–2)
LYMPHOCYTES # BLD AUTO: 3.07 THOUSANDS/ΜL (ref 0.6–4.47)
LYMPHOCYTES NFR BLD AUTO: 31 % (ref 14–44)
MCH RBC QN AUTO: 29.6 PG (ref 26.8–34.3)
MCHC RBC AUTO-ENTMCNC: 33.6 G/DL (ref 31.4–37.4)
MCV RBC AUTO: 88 FL (ref 82–98)
MONOCYTES # BLD AUTO: 0.7 THOUSAND/ΜL (ref 0.17–1.22)
MONOCYTES NFR BLD AUTO: 7 % (ref 4–12)
NEUTROPHILS # BLD AUTO: 5.85 THOUSANDS/ΜL (ref 1.85–7.62)
NEUTS SEG NFR BLD AUTO: 60 % (ref 43–75)
NRBC BLD AUTO-RTO: 0 /100 WBCS
PLATELET # BLD AUTO: 359 THOUSANDS/UL (ref 149–390)
PMV BLD AUTO: 9.6 FL (ref 8.9–12.7)
POTASSIUM SERPL-SCNC: 4.2 MMOL/L (ref 3.5–5.3)
PROT SERPL-MCNC: 7.8 G/DL (ref 6.4–8.2)
RBC # BLD AUTO: 5.17 MILLION/UL (ref 3.88–5.62)
SODIUM SERPL-SCNC: 135 MMOL/L (ref 136–145)
WBC # BLD AUTO: 9.77 THOUSAND/UL (ref 4.31–10.16)

## 2022-07-07 PROCEDURE — 82105 ALPHA-FETOPROTEIN SERUM: CPT

## 2022-07-07 PROCEDURE — 80053 COMPREHEN METABOLIC PANEL: CPT

## 2022-07-07 PROCEDURE — 36415 COLL VENOUS BLD VENIPUNCTURE: CPT

## 2022-07-07 PROCEDURE — 85025 COMPLETE CBC W/AUTO DIFF WBC: CPT

## 2022-07-14 ENCOUNTER — HOSPITAL ENCOUNTER (OUTPATIENT)
Dept: CT IMAGING | Facility: HOSPITAL | Age: 47
Discharge: HOME | End: 2022-07-14
Attending: INTERNAL MEDICINE
Payer: MEDICARE

## 2022-07-14 DIAGNOSIS — C62.92 TESTICULAR SEMINOMA, LEFT (HCC): ICD-10-CM

## 2022-07-14 PROCEDURE — G1004 CDSM NDSC: HCPCS

## 2022-07-14 PROCEDURE — 74177 CT ABD & PELVIS W/CONTRAST: CPT

## 2022-07-14 PROCEDURE — 71260 CT THORAX DX C+: CPT

## 2022-07-14 RX ADMIN — IOHEXOL 65 ML: 350 INJECTION, SOLUTION INTRAVENOUS at 12:17

## 2022-07-15 ENCOUNTER — HOSPITAL ENCOUNTER (EMERGENCY)
Facility: HOSPITAL | Age: 47
Discharge: HOME | End: 2022-07-16
Attending: EMERGENCY MEDICINE
Payer: MEDICARE

## 2022-07-15 ENCOUNTER — APPOINTMENT (EMERGENCY)
Dept: RADIOLOGY | Facility: HOSPITAL | Age: 47
End: 2022-07-15
Payer: MEDICARE

## 2022-07-15 VITALS
BODY MASS INDEX: 36.97 KG/M2 | DIASTOLIC BLOOD PRESSURE: 83 MMHG | OXYGEN SATURATION: 95 % | TEMPERATURE: 100.6 F | RESPIRATION RATE: 20 BRPM | SYSTOLIC BLOOD PRESSURE: 171 MMHG | WEIGHT: 287.92 LBS | HEART RATE: 74 BPM

## 2022-07-15 DIAGNOSIS — R55 NEAR SYNCOPE: Primary | ICD-10-CM

## 2022-07-15 LAB
ALBUMIN SERPL BCP-MCNC: 3.6 G/DL (ref 3.5–5)
ALP SERPL-CCNC: 99 U/L (ref 46–116)
ALT SERPL W P-5'-P-CCNC: 46 U/L (ref 12–78)
ANION GAP SERPL CALCULATED.3IONS-SCNC: 17 MMOL/L (ref 4–13)
APTT PPP: 26 SECONDS (ref 23–37)
AST SERPL W P-5'-P-CCNC: 19 U/L (ref 5–45)
BASOPHILS # BLD AUTO: 0.03 THOUSANDS/ΜL (ref 0–0.1)
BASOPHILS NFR BLD AUTO: 0 % (ref 0–1)
BILIRUB SERPL-MCNC: 1.03 MG/DL (ref 0.2–1)
BUN SERPL-MCNC: 16 MG/DL (ref 5–25)
CALCIUM SERPL-MCNC: 8.8 MG/DL (ref 8.3–10.1)
CARDIAC TROPONIN I PNL SERPL HS: 3 NG/L
CHLORIDE SERPL-SCNC: 98 MMOL/L (ref 100–108)
CO2 SERPL-SCNC: 20 MMOL/L (ref 21–32)
CREAT SERPL-MCNC: 1.37 MG/DL (ref 0.6–1.3)
EOSINOPHIL # BLD AUTO: 0.06 THOUSAND/ΜL (ref 0–0.61)
EOSINOPHIL NFR BLD AUTO: 1 % (ref 0–6)
ERYTHROCYTE [DISTWIDTH] IN BLOOD BY AUTOMATED COUNT: 14.3 % (ref 11.6–15.1)
GFR SERPL CREATININE-BSD FRML MDRD: 61 ML/MIN/1.73SQ M
GLUCOSE SERPL-MCNC: 181 MG/DL (ref 65–140)
HCT VFR BLD AUTO: 43.4 % (ref 36.5–49.3)
HGB BLD-MCNC: 14.5 G/DL (ref 12–17)
IMM GRANULOCYTES # BLD AUTO: 0.03 THOUSAND/UL (ref 0–0.2)
IMM GRANULOCYTES NFR BLD AUTO: 0 % (ref 0–2)
INR PPP: 0.95 (ref 0.84–1.19)
LIPASE SERPL-CCNC: 157 U/L (ref 73–393)
LYMPHOCYTES # BLD AUTO: 1.41 THOUSANDS/ΜL (ref 0.6–4.47)
LYMPHOCYTES NFR BLD AUTO: 12 % (ref 14–44)
MCH RBC QN AUTO: 29.4 PG (ref 26.8–34.3)
MCHC RBC AUTO-ENTMCNC: 33.4 G/DL (ref 31.4–37.4)
MCV RBC AUTO: 88 FL (ref 82–98)
MONOCYTES # BLD AUTO: 0.61 THOUSAND/ΜL (ref 0.17–1.22)
MONOCYTES NFR BLD AUTO: 5 % (ref 4–12)
NEUTROPHILS # BLD AUTO: 9.88 THOUSANDS/ΜL (ref 1.85–7.62)
NEUTS SEG NFR BLD AUTO: 82 % (ref 43–75)
NRBC BLD AUTO-RTO: 0 /100 WBCS
PLATELET # BLD AUTO: 318 THOUSANDS/UL (ref 149–390)
PMV BLD AUTO: 10.2 FL (ref 8.9–12.7)
POTASSIUM SERPL-SCNC: 3.8 MMOL/L (ref 3.5–5.3)
PROT SERPL-MCNC: 7.1 G/DL (ref 6.4–8.2)
PROTHROMBIN TIME: 12.2 SECONDS (ref 11.6–14.5)
RBC # BLD AUTO: 4.94 MILLION/UL (ref 3.88–5.62)
SODIUM SERPL-SCNC: 135 MMOL/L (ref 136–145)
WBC # BLD AUTO: 12.02 THOUSAND/UL (ref 4.31–10.16)

## 2022-07-15 PROCEDURE — 80053 COMPREHEN METABOLIC PANEL: CPT | Performed by: EMERGENCY MEDICINE

## 2022-07-15 PROCEDURE — 96374 THER/PROPH/DIAG INJ IV PUSH: CPT

## 2022-07-15 PROCEDURE — 85025 COMPLETE CBC W/AUTO DIFF WBC: CPT | Performed by: EMERGENCY MEDICINE

## 2022-07-15 PROCEDURE — 99285 EMERGENCY DEPT VISIT HI MDM: CPT

## 2022-07-15 PROCEDURE — 36415 COLL VENOUS BLD VENIPUNCTURE: CPT | Performed by: EMERGENCY MEDICINE

## 2022-07-15 PROCEDURE — 83690 ASSAY OF LIPASE: CPT | Performed by: EMERGENCY MEDICINE

## 2022-07-15 PROCEDURE — 85730 THROMBOPLASTIN TIME PARTIAL: CPT | Performed by: EMERGENCY MEDICINE

## 2022-07-15 PROCEDURE — 87636 SARSCOV2 & INF A&B AMP PRB: CPT | Performed by: EMERGENCY MEDICINE

## 2022-07-15 PROCEDURE — 85610 PROTHROMBIN TIME: CPT | Performed by: EMERGENCY MEDICINE

## 2022-07-15 PROCEDURE — 96361 HYDRATE IV INFUSION ADD-ON: CPT

## 2022-07-15 PROCEDURE — 93005 ELECTROCARDIOGRAM TRACING: CPT

## 2022-07-15 PROCEDURE — 84484 ASSAY OF TROPONIN QUANT: CPT | Performed by: EMERGENCY MEDICINE

## 2022-07-15 PROCEDURE — 71045 X-RAY EXAM CHEST 1 VIEW: CPT

## 2022-07-15 RX ORDER — KETOROLAC TROMETHAMINE 30 MG/ML
15 INJECTION, SOLUTION INTRAMUSCULAR; INTRAVENOUS ONCE
Status: COMPLETED | OUTPATIENT
Start: 2022-07-15 | End: 2022-07-15

## 2022-07-15 RX ADMIN — KETOROLAC TROMETHAMINE 15 MG: 30 INJECTION, SOLUTION INTRAMUSCULAR at 21:21

## 2022-07-15 RX ADMIN — SODIUM CHLORIDE 1000 ML: 0.9 INJECTION, SOLUTION INTRAVENOUS at 21:21

## 2022-07-16 LAB
2HR DELTA HS TROPONIN: 0 NG/L
BILIRUB UR QL STRIP: NEGATIVE
CARDIAC TROPONIN I PNL SERPL HS: 3 NG/L
CLARITY UR: CLEAR
COLOR UR: YELLOW
GLUCOSE UR STRIP-MCNC: NEGATIVE MG/DL
HGB UR QL STRIP.AUTO: NEGATIVE
KETONES UR STRIP-MCNC: NEGATIVE MG/DL
LEUKOCYTE ESTERASE UR QL STRIP: NEGATIVE
NITRITE UR QL STRIP: NEGATIVE
PH UR STRIP.AUTO: 5.5 [PH]
PROT UR STRIP-MCNC: NEGATIVE MG/DL
SP GR UR STRIP.AUTO: <=1.005 (ref 1–1.03)
UROBILINOGEN UR QL STRIP.AUTO: 0.2 E.U./DL

## 2022-07-16 PROCEDURE — 99284 EMERGENCY DEPT VISIT MOD MDM: CPT | Performed by: EMERGENCY MEDICINE

## 2022-07-16 PROCEDURE — 36415 COLL VENOUS BLD VENIPUNCTURE: CPT | Performed by: EMERGENCY MEDICINE

## 2022-07-16 PROCEDURE — 81003 URINALYSIS AUTO W/O SCOPE: CPT | Performed by: EMERGENCY MEDICINE

## 2022-07-16 PROCEDURE — 84484 ASSAY OF TROPONIN QUANT: CPT | Performed by: EMERGENCY MEDICINE

## 2022-07-16 NOTE — ED PROVIDER NOTES
Final Diagnosis:  1  Near syncope        Chief Complaint   Patient presents with    Syncope     Syncopal episode, NO LOC, pt sat down and stared for a few seconds  Did not respond to family during this period  HPI  Patient presents for evaluation of near syncope  History is provided by family who are bedside  Patient has history of some developmental delay as well as  shunt  Relatively recent diagnosis of testicular cancer and has received radiation as well as chemotherapy  Family states that the patient had an episode today where he was walking down some steps in be came diaphoretic and pale  He was then lowered to the stairs  He did not fully lose consciousness  No jerking movement  They state that he does have some kind a history of seizure-like activity but has not been on any medications  No fever chills  They state that his house was very warm  Brought him in for further evaluation  Patient denies any chest pain  States that he feels okay  No head trauma  Unless otherwise specified:  - No language barrier    - History obtained from patient  - There are no limitations to the history obtained  - Previous charting was reviewed    PMH:   has a past medical history of Cleft hard palate, Mass of left testicle, Seizures ([facility] [address] ), and Testicular mass (1/16/2020)  PSH:   has a past surgical history that includes Cleft lip repair; pr removal testis,radical (Left, 1/20/2020); Orchiectomy (Left); and IR biopsy retroperitoneum (12/22/2020)  ROS:  Review of Systems   -   - 13 point ROS was performed and all are normal unless stated in the history above  - Nursing note reviewed  Vitals reviewed  - Orders placed by myself and/or advanced practitioner / resident          PE:   Vitals:    07/15/22 2042 07/15/22 2045 07/15/22 2330   BP: (!) 172/79  (!) 171/83   BP Location: Right arm  Right arm   Pulse: 89  74   Resp: 18  20   Temp: (!) 100 6 °F (38 1 °C)     SpO2: 93% 97% 95%   Weight: 131 kg (287 lb 14 7 oz)       Vitals reviewed by me  Unless otherwise specified above:    General: VS reviewed  Appears in NAD    Head: Normocephalic, atraumatic  Eyes: EOM-I  No exudate  ENT: Atraumatic external nose and ears  No malocclusion  No stridor  No drooling  Neck: No JVD  CV: No pallor noted  Lungs:   No tachypnea  No respiratory distress    Abdomen:  Soft, non-tender, non-distended    MSK:   FROM spontaneously  No obvious deformity    Skin: Dry, intact  No obvious rash  Neuro: Awake, alert, GCS15, CN II-XII grossly intact  Speaking in full sentences  Motor grossly intact  Psychiatric/Behavioral: Appropriate mood and affect   Exam: deferred    Physical Exam     Procedures   A:  - Nursing note reviewed                        XR chest 1 view portable    (Results Pending)     Orders Placed This Encounter   Procedures    FLU/COVID - if FLU clinically relevant    XR chest 1 view portable    CBC and differential    Comprehensive metabolic panel    Protime-INR    APTT    Lipase    Lactic acid    HS Troponin 0hr (reflex protocol)    UA w Reflex to Microscopic w Reflex to Culture    HS Troponin I 2hr    HS Troponin I 4hr    Insert peripheral IV     Labs Reviewed   CBC AND DIFFERENTIAL - Abnormal       Result Value Ref Range Status    WBC 12 02 (*) 4 31 - 10 16 Thousand/uL Final    RBC 4 94  3 88 - 5 62 Million/uL Final    Hemoglobin 14 5  12 0 - 17 0 g/dL Final    Hematocrit 43 4  36 5 - 49 3 % Final    MCV 88  82 - 98 fL Final    MCH 29 4  26 8 - 34 3 pg Final    MCHC 33 4  31 4 - 37 4 g/dL Final    RDW 14 3  11 6 - 15 1 % Final    MPV 10 2  8 9 - 12 7 fL Final    Platelets 748  837 - 390 Thousands/uL Final    nRBC 0  /100 WBCs Final    Neutrophils Relative 82 (*) 43 - 75 % Final    Immat GRANS % 0  0 - 2 % Final    Lymphocytes Relative 12 (*) 14 - 44 % Final    Monocytes Relative 5  4 - 12 % Final    Eosinophils Relative 1  0 - 6 % Final    Basophils Relative 0  0 - 1 % Final    Neutrophils Absolute 9 88 (*) 1 85 - 7 62 Thousands/µL Final    Immature Grans Absolute 0 03  0 00 - 0 20 Thousand/uL Final    Lymphocytes Absolute 1 41  0 60 - 4 47 Thousands/µL Final    Monocytes Absolute 0 61  0 17 - 1 22 Thousand/µL Final    Eosinophils Absolute 0 06  0 00 - 0 61 Thousand/µL Final    Basophils Absolute 0 03  0 00 - 0 10 Thousands/µL Final   COMPREHENSIVE METABOLIC PANEL - Abnormal    Sodium 135 (*) 136 - 145 mmol/L Final    Potassium 3 8  3 5 - 5 3 mmol/L Final    Chloride 98 (*) 100 - 108 mmol/L Final    CO2 20 (*) 21 - 32 mmol/L Final    ANION GAP 17 (*) 4 - 13 mmol/L Final    BUN 16  5 - 25 mg/dL Final    Creatinine 1 37 (*) 0 60 - 1 30 mg/dL Final    Comment: Standardized to IDMS reference method    Glucose 181 (*) 65 - 140 mg/dL Final    Comment: If the patient is fasting, the ADA then defines impaired fasting glucose as > 100 mg/dL and diabetes as > or equal to 123 mg/dL  Specimen collection should occur prior to Sulfasalazine administration due to the potential for falsely depressed results  Specimen collection should occur prior to Sulfapyridine administration due to the potential for falsely elevated results  Calcium 8 8  8 3 - 10 1 mg/dL Final    AST 19  5 - 45 U/L Final    Comment: Specimen collection should occur prior to Sulfasalazine administration due to the potential for falsely depressed results  ALT 46  12 - 78 U/L Final    Comment: Specimen collection should occur prior to Sulfasalazine administration due to the potential for falsely depressed results  Alkaline Phosphatase 99  46 - 116 U/L Final    Total Protein 7 1  6 4 - 8 2 g/dL Final    Albumin 3 6  3 5 - 5 0 g/dL Final    Total Bilirubin 1 03 (*) 0 20 - 1 00 mg/dL Final    Comment: Use of this assay is not recommended for patients undergoing treatment with eltrombopag due to the potential for falsely elevated results      eGFR 61  ml/min/1 73sq m Final    Narrative:     National Kidney Disease Foundation guidelines for Chronic Kidney Disease (CKD):     Stage 1 with normal or high GFR (GFR > 90 mL/min/1 73 square meters)    Stage 2 Mild CKD (GFR = 60-89 mL/min/1 73 square meters)    Stage 3A Moderate CKD (GFR = 45-59 mL/min/1 73 square meters)    Stage 3B Moderate CKD (GFR = 30-44 mL/min/1 73 square meters)    Stage 4 Severe CKD (GFR = 15-29 mL/min/1 73 square meters)    Stage 5 End Stage CKD (GFR <15 mL/min/1 73 square meters)  Note: GFR calculation is accurate only with a steady state creatinine   PROTIME-INR - Normal    Protime 12 2  11 6 - 14 5 seconds Final    INR 0 95  0 84 - 1 19 Final   APTT - Normal    PTT 26  23 - 37 seconds Final    Comment: Therapeutic Heparin Range =  60-90 seconds   LIPASE - Normal    Lipase 157  73 - 393 u/L Final   HS TROPONIN I 0HR - Normal    hs TnI 0hr 3  "Refer to ACS Flowchart"- see link ng/L Final    Comment:                                              Initial (time 0) result  If >=50 ng/L, Myocardial injury suggested ;  Type of myocardial injury and treatment strategy  to be determined  If 5-49 ng/L, a delta result at 2 hours and or 4 hours will be needed to further evaluate  If <4 ng/L, and chest pain has been >3 hours since onset, patient may qualify for discharge based on the HEART score in the ED  If <5 ng/L and <3hours since onset of chest pain, a delta result at 2 hours will be needed to further evaluate  HS Troponin 99th Percentile URL of a Health Population=12 ng/L with a 95% Confidence Interval of 8-18 ng/L  Second Troponin (time 2 hours)  If calculated delta >= 20 ng/L,  Myocardial injury suggested ; Type of myocardial injury and treatment strategy to be determined  If 5-49 ng/L and the calculated delta is 5-19 ng/L, consult medical service for evaluation  Continue evaluation for ischemia on ecg and other possible etiology and repeat hs troponin at 4 hours    If delta is <5 ng/L at 2 hours, consider discharge based on risk stratification via the HEART score (if in ED), or ASHLEY risk score in IP/Observation  HS Troponin 99th Percentile URL of a Health Population=12 ng/L with a 95% Confidence Interval of 8-18 ng/L    HS TROPONIN I 2HR - Normal    hs TnI 2hr 3  "Refer to ACS Flowchart"- see link ng/L Final    Comment:                                              Initial (time 0) result  If >=50 ng/L, Myocardial injury suggested ;  Type of myocardial injury and treatment strategy  to be determined  If 5-49 ng/L, a delta result at 2 hours and or 4 hours will be needed to further evaluate  If <4 ng/L, and chest pain has been >3 hours since onset, patient may qualify for discharge based on the HEART score in the ED  If <5 ng/L and <3hours since onset of chest pain, a delta result at 2 hours will be needed to further evaluate  HS Troponin 99th Percentile URL of a Health Population=12 ng/L with a 95% Confidence Interval of 8-18 ng/L  Second Troponin (time 2 hours)  If calculated delta >= 20 ng/L,  Myocardial injury suggested ; Type of myocardial injury and treatment strategy to be determined  If 5-49 ng/L and the calculated delta is 5-19 ng/L, consult medical service for evaluation  Continue evaluation for ischemia on ecg and other possible etiology and repeat hs troponin at 4 hours  If delta is <5 ng/L at 2 hours, consider discharge based on risk stratification via the HEART score (if in ED), or ASHLEY risk score in IP/Observation  HS Troponin 99th Percentile URL of a Health Population=12 ng/L with a 95% Confidence Interval of 8-18 ng/L      Delta 2hr hsTnI 0  <20 ng/L Final   COVID19 AND INFLUENZA A/B PCR   UA W REFLEX TO MICROSCOPIC WITH REFLEX TO CULTURE    Color, UA Yellow   Final    Clarity, UA Clear   Final    Specific Gravity, UA <=1 005  1 003 - 1 030 Final    pH, UA 5 5  4 5, 5 0, 5 5, 6 0, 6 5, 7 0, 7 5, 8 0 Final    Leukocytes, UA Negative  Negative Final    Nitrite, UA Negative  Negative Final    Protein, UA Negative  Negative mg/dl Final    Glucose, UA Negative  Negative mg/dl Final    Ketones, UA Negative  Negative mg/dl Final    Urobilinogen, UA 0 2  0 2, 1 0 E U /dl E U /dl Final    Bilirubin, UA Negative  Negative Final    Occult Blood, UA Negative  Negative Final   LACTIC ACID, PLASMA   HS TROPONIN I 4HR         Final Diagnosis:  1  Near syncope        P:  - patient presents for evaluation of syncopal like event  Will evaluate for cardiac pathology was CBC, BMP, troponin, chest x-ray  Given patient's recent occurrence this will need to have delta troponin performed  -EKG without ischemic changes  Troponin delta troponin negative   -discussed all results with patient family  At this time low suspicion for cardiac ideology  Possibly dehydration  Discussed return precautions of following up with primary care  Medications   sodium chloride 0 9 % bolus 1,000 mL (0 mL Intravenous Stopped 7/15/22 2221)   ketorolac (TORADOL) injection 15 mg (15 mg Intravenous Given 7/15/22 2121)     Time reflects when diagnosis was documented in both MDM as applicable and the Disposition within this note     Time User Action Codes Description Comment    7/16/2022 12:56 AM [facility] Add [R55] Near syncope       ED Disposition     ED Disposition   Discharge    Condition   Stable    Date/Time   Sat Jul 16, 2022 12:56 AM    Comment   Trenton Lu discharge to home/self care                 Follow-up Information     Follow up With Specialties Details Why Contact Saul Arroyo DO Internal Medicine   [address]  [phone]          Discharge Medication List as of 7/16/2022 12:57 AM      CONTINUE these medications which have NOT CHANGED    Details   magnesium oxide (MAG-OX) 400 mg Take 1 tablet (400 mg total) by mouth 2 (two) times a day, Starting Tue 3/23/2021, Normal      meloxicam (MOBIC) 15 mg tablet Take 1 tablet (15 mg total) by mouth daily, Starting Tue 12/10/2019, Normal      ondansetron (ZOFRAN) 8 mg tablet Take 1 tablet (8 mg total) by mouth every 8 (eight) hours as needed for nausea or vomiting, Starting Mon 1/18/2021, Normal           No discharge procedures on file  Prior to Admission Medications   Prescriptions Last Dose Informant Patient Reported? Taking?   magnesium oxide (MAG-OX) 400 mg  Self No No   Sig: Take 1 tablet (400 mg total) by mouth 2 (two) times a day   Patient not taking: Reported on 5/16/2022   meloxicam (MOBIC) 15 mg tablet  Self No No   Sig: Take 1 tablet (15 mg total) by mouth daily   Patient not taking: Reported on 5/16/2022   ondansetron (ZOFRAN) 8 mg tablet  Self No No   Sig: Take 1 tablet (8 mg total) by mouth every 8 (eight) hours as needed for nausea or vomiting   Patient not taking: Reported on 5/16/2022      Facility-Administered Medications: None       Portions of the record may have been created with voice recognition software  Occasional wrong word or "sound a like" substitutions may have occurred due to the inherent limitations of voice recognition software  Read the chart carefully and recognize, using context, where substitutions have occurred      Electronically signed by:  MD Derek Sawant MD  07/16/22 [id number]

## 2022-07-17 LAB
FLUAV RNA RESP QL NAA+PROBE: NEGATIVE
FLUBV RNA RESP QL NAA+PROBE: NEGATIVE
SARS-COV-2 RNA RESP QL NAA+PROBE: NEGATIVE

## 2022-07-21 LAB
ATRIAL RATE: 89 BPM
P AXIS: 41 DEGREES
PR INTERVAL: 114 MS
QRS AXIS: 15 DEGREES
QRSD INTERVAL: 90 MS
QT INTERVAL: 366 MS
QTC INTERVAL: 445 MS
T WAVE AXIS: 63 DEGREES
VENTRICULAR RATE: 89 BPM

## 2022-07-21 PROCEDURE — 93010 ELECTROCARDIOGRAM REPORT: CPT | Performed by: INTERNAL MEDICINE

## 2022-07-22 ENCOUNTER — OFFICE VISIT (OUTPATIENT)
Dept: HEMATOLOGY ONCOLOGY | Facility: CLINIC | Age: 47
End: 2022-07-22
Payer: MEDICARE

## 2022-07-22 VITALS
BODY MASS INDEX: 36.83 KG/M2 | DIASTOLIC BLOOD PRESSURE: 96 MMHG | HEIGHT: 74 IN | TEMPERATURE: 98.8 F | WEIGHT: 287 LBS | SYSTOLIC BLOOD PRESSURE: 149 MMHG | HEART RATE: 80 BPM

## 2022-07-22 DIAGNOSIS — G89.29 CHRONIC PAIN OF RIGHT HIP: ICD-10-CM

## 2022-07-22 DIAGNOSIS — M79.2 NEUROPATHIC PAIN: ICD-10-CM

## 2022-07-22 DIAGNOSIS — C62.92 TESTICULAR SEMINOMA, LEFT (HCC): Primary | ICD-10-CM

## 2022-07-22 DIAGNOSIS — M25.551 CHRONIC PAIN OF RIGHT HIP: ICD-10-CM

## 2022-07-22 PROCEDURE — 99214 OFFICE O/P EST MOD 30 MIN: CPT | Performed by: INTERNAL MEDICINE

## 2022-07-22 RX ORDER — GABAPENTIN 100 MG/1
100 CAPSULE ORAL 3 TIMES DAILY PRN
Qty: 90 CAPSULE | Refills: 1 | Status: SHIPPED | OUTPATIENT
Start: 2022-07-22

## 2022-07-22 NOTE — PROGRESS NOTES
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  Clinically he is stable  He has continued discomfort in the right hip  Recent CBC shows white count 12 0, otherwise normal   CMP shows creatinine 1 37  I note that this has been gradually rising over the past 18 months  If his next parameter is more abnormal nephrology evaluation would be favored  I asked him to increase fluid intake, decrease soda intake  Blood sugar is 180, possibly dietary related  Recent CT chest abdomen pelvis shows no further abnormalities compared to the prior study  Specifically, there is no bony metastasis in the pelvis or hip joints bilateral   There are some arthritic changes in the lumbar spine  I suspect that he has radicular right inguinal discomfort  Gabapentin 100 mg 2-3 times per day p r n  recommended  I discussed the above with the patient  The patient and his aide voiced understanding and agreement   ______________________________________________________________________    No chief complaint on file  HPI:  Oncology History Overview Note   Patient presents today for radiation consult for testicular cancer, referred by Dr Orlando Pittman  38year old male with a history of developmental delay,  shunt placed at birth  He was evaluated by his PCP in January 2020 for scrotal swelling  US revealed left testicular mass, he was referred to Urology  1/15/20 US scrotum and testicles  IMPRESSION:   Markedly enlarged and abnormal left testicle with normal vascular flow on Doppler interrogation    The finding raises the suspicion of infiltrating process and possibly testicular malignancy (perhaps testicular lymphoma) especially given the history of slowly progressive testicular swelling  FINDINGS:   TESTES:    RIGHT testis = 4 5 x 1 7 x 2 8 cm   The right testicle demonstrates normal contour with homogeneous smooth echotexture  No right-sided intratesticular mass lesion or calcifications    LEFT testis = 15 1 x 9 7 x 14 0 cm  The left testicle is markedly enlarged with profoundly heterogeneous echotexture      1/17/20 Urology, Dr Bernice Griffin for left radical orchiectomy and staging imaging  1/20/20 Left radical orchiectomy     2/3/20 Urology follow-up, Dr Mike Diaz  Reviewed pathology, left testicular seminoma, 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  Scrotal skin was negative for tumor involvement, no tumor was seen at the spermatic cord or the spermatic cord margin, and no penetration through the tunica vaginalis was seen  Plan for CT abd/pelvis and chest xray, recheck lab work  Follow-up to review and referral at that time  2/5/20 Bloodwork:  AFP tumor marker: 5 0  HCG tumor marker: < 1  HCG, Quantitative: < 0 6      2/7/20 CT Abd/Pelvis  IMPRESSION:   1   No evidence of metastatic disease in the abdomen or pelvis    2   Large collection in the left hemiscrotum with central hyperdensity likely representing hematoma  Clinical correlation recommended    3   Postoperative stranding in the left inguinal region with prominent lymph nodes which are likely reactive    4   Cholelithiasis  2/7/20 XR chest pa & lateral  IMPRESSION:   No acute cardiopulmonary disease  2/17/20 Urology f/u with Dr Mike Diaz  CT negative for metastases, HCG and AFP markers are negative  Refer to Radiation Oncology for consideration of hockey-stick adjuvant radiation therapy  Scrotal hematoma mostly resolved, no signs of infection, follow-up in 3 months  2/18/20 Multidisciplinary Urology Case Review  Physician Recommended Plan:  Referral to Radiation Oncology and Medical Oncology        2/21/20 Dr Misael Ramos  Favor observation   Prophylactic radiation therapy or chemotherapy are reasonable to consider  Reviewed that likelihood of cure is high, observation is reasonable with a goal of avoiding unnecessary treatment  If recurrent, salvage is generally quite effective  Pt and mother prefer avoiding therapy unless necessary  Plan for follow-up in May with repeat imaging and bloodwork to initiate surveillance  5/8/20 CT abd/pelvis and CXR  5/12/20 Dr Mike Diaz Urology follow-up  5/15/20 Karyle Hermanns Dr Lucio Gaba follow-up           Testicular seminoma, left [facility])   1/2020 Initial Diagnosis    Testicular seminoma, left ([address] )     1/20/2020 Surgery    Left radical orchiectomy inguinal, patrial scrotectomy (Left)-combination of inguinal and scrotal approach due to size     A  Left testis and spermatic cord:  - Seminoma, 15 5 cm in greatest dimension, with extension beyond tunica albuginea as evidenced by tumor in epididymis and in hilar fat  No penetration through tunica vaginalis is appreciated  - No tumor seen within spermatic cord or at spermatic cord margin  - See synoptic report below     B   Scrotal skin:  - No tumor seen         1/18/2021 - 4/9/2021 Chemotherapy    pegfilgrastim (NEULASTA ONPRO), 6 mg, Subcutaneous, Once, 4 of 4 cycles  Administration: 6 mg (1/29/2021), 6 mg (2/22/2021), 6 mg (3/12/2021), 6 mg (4/9/2021)  CISplatin (PLATINOL) infusion, 48 8 mg, Intravenous, Once, 4 of 4 cycles  Administration: 50 mg (1/18/2021), 50 mg (1/19/2021), 50 mg (2/15/2021), 50 mg (1/20/2021), 50 mg (1/28/2021), 50 mg (1/29/2021), 50 mg (2/16/2021), 50 mg (3/8/2021), 50 mg (2/17/2021), 50 mg (2/18/2021), 50 mg (2/22/2021), 50 mg (3/9/2021), 50 mg (4/5/2021), 50 mg (3/10/2021), 50 mg (3/11/2021), 50 mg (3/12/2021), 50 mg (4/9/2021), 50 mg (4/8/2021), 50 mg (4/6/2021), 50 mg (4/7/2021)  fosaprepitant (EMEND) IVPB, 150 mg, Intravenous, Once, 4 of 4 cycles  Administration: 150 mg (1/18/2021), 150 mg (2/15/2021), 150 mg (3/8/2021), 150 mg (4/5/2021), 150 mg (1/28/2021)  etoposide (Deirdre Golden) in NSS infusion, 100 mg/m2 = 244 mg, Intravenous, Once, 4 of 4 cycles  Administration: 244 mg (1/18/2021), 244 mg (1/19/2021), 244 mg (2/15/2021), 244 mg (1/20/2021), 244 mg (1/28/2021), 244 mg (1/29/2021), 244 mg (2/16/2021), 244 mg (3/8/2021), 244 mg (2/17/2021), 244 mg (2/18/2021), 244 mg (2/22/2021), 244 mg (3/9/2021), 244 mg (4/5/2021), 244 mg (3/10/2021), 244 mg (3/11/2021), 244 mg (3/12/2021), 244 mg (4/9/2021), 244 mg (4/8/2021), 244 mg (4/6/2021), 244 mg (4/7/2021)         Interval History:  Clinically stable  Right hip pain  ECOG-  1 - Symptomatic but completely ambulatory    Review of Systems   Constitutional: Negative for chills and fever  HENT: Negative for nosebleeds  Eyes: Negative for discharge  Respiratory: Negative for cough and shortness of breath  Cardiovascular: Negative for chest pain  Gastrointestinal: Negative for abdominal pain, constipation and diarrhea  Endocrine: Negative for polydipsia  Genitourinary: Negative for hematuria  Musculoskeletal: Negative for arthralgias  Skin: Negative for color change  Allergic/Immunologic: Negative for immunocompromised state  Neurological: Negative for dizziness and headaches  Hematological: Negative for adenopathy  Psychiatric/Behavioral: Negative for agitation         Past Medical History:   Diagnosis Date    Cleft hard palate     Mass of left testicle     Seizures ([facility] [address] )     Testicular mass 1/16/2020    Added automatically from request for surgery [id number]     Patient Active Problem List   Diagnosis    Mentally challenged    Tobacco abuse    Nonepileptic episode ([facility] [address] )    S/P ventriculoperitoneal shunt    Chronic pain of right hip    Arachnoid cyst    Testicular seminoma, left ([facility] [address] )    Prevention of chemotherapy-induced neutropenia    Secondary and unspecified malignant neoplasm of intra-abdominal lymph nodes ([facility] [address] )       Current Outpatient Medications:     magnesium oxide (MAG-OX) 400 mg, Take 1 tablet (400 mg total) by mouth 2 (two) times a day (Patient not taking: Reported on 5/16/2022), Disp: 60 tablet, Rfl: 0    meloxicam (MOBIC) 15 mg tablet, Take 1 tablet (15 mg total) by mouth daily (Patient not taking: Reported on 5/16/2022), Disp: 90 tablet, Rfl: 3    ondansetron (ZOFRAN) 8 mg tablet, Take 1 tablet (8 mg total) by mouth every 8 (eight) hours as needed for nausea or vomiting (Patient not taking: Reported on 5/16/2022), Disp: 20 tablet, Rfl: 2  No Known Allergies  Past Surgical History:   Procedure Laterality Date    CLEFT LIP REPAIR      IR BIOPSY RETROPERITONEUM  12/22/2020    ORCHIECTOMY Left     DC REMOVAL TESTIS,RADICAL Left 1/20/2020    Procedure: LEFT RADICAL ORCHIECTOMY INGUINAL, patrial scrotectomy;  Surgeon: Gigi Williamson MD;  Location: [facility];  Service: Urology     Social History     Objective: There were no vitals filed for this visit  Physical Exam  Constitutional:       Appearance: He is well-developed  HENT:      Head: Normocephalic and atraumatic  Eyes:      Pupils: Pupils are equal, round, and reactive to light  Cardiovascular:      Rate and Rhythm: Normal rate and regular rhythm  Heart sounds: No murmur heard  Pulmonary:      Breath sounds: Normal breath sounds  No wheezing or rales  Abdominal:      Palpations: Abdomen is soft  Tenderness: There is no abdominal tenderness  Musculoskeletal:         General: No tenderness  Normal range of motion  Cervical back: Neck supple  Lymphadenopathy:      Cervical: No cervical adenopathy  Skin:     Findings: No erythema or rash  Neurological:      Mental Status: He is alert and oriented to person, place, and time  Cranial Nerves: No cranial nerve deficit  Deep Tendon Reflexes: Reflexes are normal and symmetric  Psychiatric:         Behavior: Behavior normal            Labs: I personally reviewed the labs and imaging pertinent to this patient care

## 2023-01-09 ENCOUNTER — APPOINTMENT (OUTPATIENT)
Dept: LAB | Facility: HOSPITAL | Age: 48
End: 2023-01-09
Attending: INTERNAL MEDICINE

## 2023-01-09 DIAGNOSIS — C62.92 TESTICULAR SEMINOMA, LEFT (HCC): ICD-10-CM

## 2023-01-09 LAB
AFP-TM SERPL-MCNC: 6.2 NG/ML (ref 0.5–8)
ALBUMIN SERPL BCP-MCNC: 3.9 G/DL (ref 3.5–5)
ALP SERPL-CCNC: 93 U/L (ref 46–116)
ALT SERPL W P-5'-P-CCNC: 67 U/L (ref 12–78)
ANION GAP SERPL CALCULATED.3IONS-SCNC: 9 MMOL/L (ref 4–13)
AST SERPL W P-5'-P-CCNC: 26 U/L (ref 5–45)
BASOPHILS # BLD AUTO: 0.05 THOUSANDS/ÂΜL (ref 0–0.1)
BASOPHILS NFR BLD AUTO: 0 % (ref 0–1)
BILIRUB SERPL-MCNC: 0.86 MG/DL (ref 0.2–1)
BUN SERPL-MCNC: 15 MG/DL (ref 5–25)
CALCIUM SERPL-MCNC: 9.6 MG/DL (ref 8.3–10.1)
CHLORIDE SERPL-SCNC: 102 MMOL/L (ref 96–108)
CO2 SERPL-SCNC: 27 MMOL/L (ref 21–32)
CREAT SERPL-MCNC: 1.07 MG/DL (ref 0.6–1.3)
EOSINOPHIL # BLD AUTO: 0.19 THOUSAND/ÂΜL (ref 0–0.61)
EOSINOPHIL NFR BLD AUTO: 2 % (ref 0–6)
ERYTHROCYTE [DISTWIDTH] IN BLOOD BY AUTOMATED COUNT: 14.1 % (ref 11.6–15.1)
GFR SERPL CREATININE-BSD FRML MDRD: 82 ML/MIN/1.73SQ M
GLUCOSE P FAST SERPL-MCNC: 108 MG/DL (ref 65–99)
HCT VFR BLD AUTO: 48.2 % (ref 36.5–49.3)
HGB BLD-MCNC: 16.1 G/DL (ref 12–17)
IMM GRANULOCYTES # BLD AUTO: 0.03 THOUSAND/UL (ref 0–0.2)
IMM GRANULOCYTES NFR BLD AUTO: 0 % (ref 0–2)
LYMPHOCYTES # BLD AUTO: 4.29 THOUSANDS/ÂΜL (ref 0.6–4.47)
LYMPHOCYTES NFR BLD AUTO: 37 % (ref 14–44)
MCH RBC QN AUTO: 30 PG (ref 26.8–34.3)
MCHC RBC AUTO-ENTMCNC: 33.4 G/DL (ref 31.4–37.4)
MCV RBC AUTO: 90 FL (ref 82–98)
MONOCYTES # BLD AUTO: 0.77 THOUSAND/ÂΜL (ref 0.17–1.22)
MONOCYTES NFR BLD AUTO: 7 % (ref 4–12)
NEUTROPHILS # BLD AUTO: 6.4 THOUSANDS/ÂΜL (ref 1.85–7.62)
NEUTS SEG NFR BLD AUTO: 54 % (ref 43–75)
NRBC BLD AUTO-RTO: 0 /100 WBCS
PLATELET # BLD AUTO: 351 THOUSANDS/UL (ref 149–390)
PMV BLD AUTO: 10 FL (ref 8.9–12.7)
POTASSIUM SERPL-SCNC: 3.9 MMOL/L (ref 3.5–5.3)
PROT SERPL-MCNC: 7.8 G/DL (ref 6.4–8.4)
RBC # BLD AUTO: 5.36 MILLION/UL (ref 3.88–5.62)
SODIUM SERPL-SCNC: 138 MMOL/L (ref 135–147)
WBC # BLD AUTO: 11.73 THOUSAND/UL (ref 4.31–10.16)

## 2023-01-16 ENCOUNTER — HOSPITAL ENCOUNTER (OUTPATIENT)
Dept: CT IMAGING | Facility: HOSPITAL | Age: 48
Discharge: HOME | End: 2023-01-16
Attending: INTERNAL MEDICINE

## 2023-01-16 DIAGNOSIS — C62.92 TESTICULAR SEMINOMA, LEFT (HCC): ICD-10-CM

## 2023-01-16 RX ADMIN — IOHEXOL 100 ML: 350 INJECTION, SOLUTION INTRAVENOUS at 10:23

## 2023-01-24 ENCOUNTER — TELEPHONE (OUTPATIENT)
Dept: GASTROENTEROLOGY | Facility: CLINIC | Age: 48
End: 2023-01-24

## 2023-01-24 ENCOUNTER — PREP FOR PROCEDURE (OUTPATIENT)
Dept: GASTROENTEROLOGY | Facility: CLINIC | Age: 48
End: 2023-01-24

## 2023-01-24 DIAGNOSIS — Z12.11 SCREENING FOR COLON CANCER: Primary | ICD-10-CM

## 2023-01-24 NOTE — TELEPHONE ENCOUNTER
Scheduled date of colonoscopy (as of today):3/1/23  Physician performing colonoscopy:DR NATARAJAN  Location of colonoscopy:[place]  Clearances: NA

## 2023-01-27 ENCOUNTER — OFFICE VISIT (OUTPATIENT)
Dept: HEMATOLOGY ONCOLOGY | Facility: CLINIC | Age: 48
End: 2023-01-27

## 2023-01-27 VITALS
DIASTOLIC BLOOD PRESSURE: 86 MMHG | OXYGEN SATURATION: 96 % | BODY MASS INDEX: 36.96 KG/M2 | WEIGHT: 288 LBS | SYSTOLIC BLOOD PRESSURE: 143 MMHG | HEART RATE: 92 BPM | HEIGHT: 74 IN | TEMPERATURE: 98.4 F

## 2023-01-27 DIAGNOSIS — C77.2 SECONDARY AND UNSPECIFIED MALIGNANT NEOPLASM OF INTRA-ABDOMINAL LYMPH NODES (HCC): ICD-10-CM

## 2023-01-27 DIAGNOSIS — C62.92 TESTICULAR SEMINOMA, LEFT (HCC): Primary | ICD-10-CM

## 2023-01-27 NOTE — PROGRESS NOTES
[facility] HEMATOLOGY ONCOLOGY SPECIALISTS [place]  [address] 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  He has right hip pain, as before  Otherwise no new medical changes  CMP shows normalized creatinine  CBC shows white count 11, normal hemoglobin, platelets, differential   aFP is normal   CT chest ab pelvis shows stable pulmonary nodules measuring under 4 mm unchanged for 2 5 years  3 cm right thyroid nodule also stable  No other new findings suggestive of metastatic disease  Observation remains appropriate  I discussed the above with the patient  The patient  voiced understanding and agreement   ______________________________________________________________________    No chief complaint on file  HPI:  Oncology History Overview Note   Patient presents today for radiation consult for testicular cancer, referred by Dr Donavon Blankenship  38year old male with a history of developmental delay,  shunt placed at birth  He was evaluated by his PCP in January 2020 for scrotal swelling  US revealed left testicular mass, he was referred to Urology  1/15/20 US scrotum and testicles  IMPRESSION:   Markedly enlarged and abnormal left testicle with normal vascular flow on Doppler interrogation  The finding raises the suspicion of infiltrating process and possibly testicular malignancy (perhaps testicular lymphoma) especially given the history of slowly progressive testicular swelling  FINDINGS:   TESTES:    RIGHT testis = 4 5 x 1 7 x 2 8 cm   The right testicle demonstrates normal contour with homogeneous smooth echotexture    No right-sided intratesticular mass lesion or calcifications    LEFT testis = 15 1 x 9 7 x 14 0 cm  The left testicle is markedly enlarged with profoundly heterogeneous echotexture      1/17/20 Urology, Dr Katelyn Vasquez for left radical orchiectomy and staging imaging  1/20/20 Left radical orchiectomy     2/3/20 Urology follow-up, Dr Lisseth Sharma  Reviewed pathology, left testicular seminoma, 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  Scrotal skin was negative for tumor involvement, no tumor was seen at the spermatic cord or the spermatic cord margin, and no penetration through the tunica vaginalis was seen  Plan for CT abd/pelvis and chest xray, recheck lab work  Follow-up to review and referral at that time  2/5/20 Bloodwork:  AFP tumor marker: 5 0  HCG tumor marker: < 1  HCG, Quantitative: < 0 6      2/7/20 CT Abd/Pelvis  IMPRESSION:   1   No evidence of metastatic disease in the abdomen or pelvis    2   Large collection in the left hemiscrotum with central hyperdensity likely representing hematoma  Clinical correlation recommended    3   Postoperative stranding in the left inguinal region with prominent lymph nodes which are likely reactive    4   Cholelithiasis  2/7/20 XR chest pa & lateral  IMPRESSION:   No acute cardiopulmonary disease  2/17/20 Urology f/u with Dr Lisseth Sharma  CT negative for metastases, HCG and AFP markers are negative  Refer to Radiation Oncology for consideration of hockey-stick adjuvant radiation therapy  Scrotal hematoma mostly resolved, no signs of infection, follow-up in 3 months  2/18/20 Multidisciplinary Urology Case Review  Physician Recommended Plan:  Referral to Radiation Oncology and Medical Oncology        2/21/20 Dr Carlos Manuel Rivera  Favor observation  Prophylactic radiation therapy or chemotherapy are reasonable to consider  Reviewed that likelihood of cure is high, observation is reasonable with a goal of avoiding unnecessary treatment  If recurrent, salvage is generally quite effective  Pt and mother prefer avoiding therapy unless necessary  Plan for follow-up in May with repeat imaging and bloodwork to initiate surveillance        5/8/20 CT abd/pelvis and CXR  5/12/20 Dr Zev Giraldo Urology follow-up  5/15/20 Rukhsana Zavala follow-up           Testicular seminoma, left [facility])   1/2020 Initial Diagnosis    Testicular seminoma, left ([address] )     1/20/2020 Surgery    Left radical orchiectomy inguinal, patrial scrotectomy (Left)-combination of inguinal and scrotal approach due to size     A  Left testis and spermatic cord:  - Seminoma, 15 5 cm in greatest dimension, with extension beyond tunica albuginea as evidenced by tumor in epididymis and in hilar fat  No penetration through tunica vaginalis is appreciated  - No tumor seen within spermatic cord or at spermatic cord margin  - See synoptic report below     B   Scrotal skin:  - No tumor seen         1/18/2021 - 4/9/2021 Chemotherapy    pegfilgrastim (NEULASTA ONPRO), 6 mg, Subcutaneous, Once, 4 of 4 cycles  Administration: 6 mg (1/29/2021), 6 mg (2/22/2021), 6 mg (3/12/2021), 6 mg (4/9/2021)  CISplatin (PLATINOL) infusion, 48 8 mg, Intravenous, Once, 4 of 4 cycles  Administration: 50 mg (1/18/2021), 50 mg (1/19/2021), 50 mg (2/15/2021), 50 mg (1/20/2021), 50 mg (1/28/2021), 50 mg (1/29/2021), 50 mg (2/16/2021), 50 mg (3/8/2021), 50 mg (2/17/2021), 50 mg (2/18/2021), 50 mg (2/22/2021), 50 mg (3/9/2021), 50 mg (4/5/2021), 50 mg (3/10/2021), 50 mg (3/11/2021), 50 mg (3/12/2021), 50 mg (4/9/2021), 50 mg (4/8/2021), 50 mg (4/6/2021), 50 mg (4/7/2021)  fosaprepitant (EMEND) IVPB, 150 mg, Intravenous, Once, 4 of 4 cycles  Administration: 150 mg (1/18/2021), 150 mg (2/15/2021), 150 mg (3/8/2021), 150 mg (4/5/2021), 150 mg (1/28/2021)  etoposide (TOPOSAR) in NSS infusion, 100 mg/m2 = 244 mg, Intravenous, Once, 4 of 4 cycles  Administration: 244 mg (1/18/2021), 244 mg (1/19/2021), 244 mg (2/15/2021), 244 mg (1/20/2021), 244 mg (1/28/2021), 244 mg (1/29/2021), 244 mg (2/16/2021), 244 mg (3/8/2021), 244 mg (2/17/2021), 244 mg (2/18/2021), 244 mg (2/22/2021), 244 mg (3/9/2021), 244 mg (4/5/2021), 244 mg (3/10/2021), 244 mg (3/11/2021), 244 mg (3/12/2021), 244 mg (4/9/2021), 244 mg (4/8/2021), 244 mg (4/6/2021), 244 mg (4/7/2021)         Interval History: Medically stable  ECOG-  1 - Symptomatic but completely ambulatory    Review of Systems   Constitutional: Negative for chills and fever  HENT: Negative for nosebleeds  Eyes: Negative for discharge  Respiratory: Negative for cough and shortness of breath  Cardiovascular: Negative for chest pain  Gastrointestinal: Negative for abdominal pain, constipation and diarrhea  Endocrine: Negative for polydipsia  Genitourinary: Negative for hematuria  Musculoskeletal: Negative for arthralgias  Right hip pain   Skin: Negative for color change  Allergic/Immunologic: Negative for immunocompromised state  Neurological: Negative for dizziness and headaches  Hematological: Negative for adenopathy  Psychiatric/Behavioral: Negative for agitation         Past Medical History:   Diagnosis Date   • Cleft hard palate    • Mass of left testicle    • Seizures ([facility] [address] )    • Testicular mass 1/16/2020    Added automatically from request for surgery [id number]     Patient Active Problem List   Diagnosis   • Mentally challenged   • Tobacco abuse   • Nonepileptic episode ([facility] [address] )   • S/P ventriculoperitoneal shunt   • Chronic pain of right hip   • Arachnoid cyst   • Testicular seminoma, left ([facility])   • Prevention of chemotherapy-induced neutropenia   • 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)       Current Outpatient Medications:   •  gabapentin (Neurontin) 100 mg capsule, Take 1 capsule (100 mg total) by mouth 3 (three) times a day as needed (right hip pain), Disp: 90 capsule, Rfl: 1  •  magnesium oxide (MAG-OX) 400 mg, Take 1 tablet (400 mg total) by mouth 2 (two) times a day (Patient not taking: Reported on 5/16/2022), Disp: 60 tablet, Rfl: 0  •  meloxicam (MOBIC) 15 mg tablet, Take 1 tablet (15 mg total) by mouth daily (Patient not taking: Reported on 5/16/2022), Disp: 90 tablet, Rfl: 3  •  ondansetron (ZOFRAN) 8 mg tablet, Take 1 tablet (8 mg total) by mouth every 8 (eight) hours as needed for nausea or vomiting (Patient not taking: Reported on 5/16/2022), Disp: 20 tablet, Rfl: 2  No Known Allergies  Past Surgical History:   Procedure Laterality Date   • CLEFT LIP REPAIR     • IR BIOPSY RETROPERITONEUM  12/22/2020   • ORCHIECTOMY Left    • CA ORCHIECTOMY RADICAL TUMOR INGUINAL APPROACH Left 1/20/2020    Procedure: LEFT RADICAL ORCHIECTOMY INGUINAL, patrial scrotectomy;  Surgeon: Johnathan Schneider MD;  Location: [facility];  Service: Urology     Social History     Objective:  Vitals:    01/27/23 1246   BP: 143/86   Pulse: 92   Temp: 98 4 °F (36 9 °C)   TempSrc: Tympanic   SpO2: 96%   Weight: 131 kg (288 lb)   Height: 6' 2" (1 88 m)     Physical Exam  Constitutional:       Appearance: He is well-developed  HENT:      Head: Normocephalic and atraumatic  Eyes:      Pupils: Pupils are equal, round, and reactive to light  Cardiovascular:      Rate and Rhythm: Normal rate and regular rhythm  Heart sounds: No murmur heard  Pulmonary:      Breath sounds: Normal breath sounds  No wheezing or rales  Abdominal:      Palpations: Abdomen is soft  Tenderness: There is no abdominal tenderness  Musculoskeletal:         General: No tenderness  Normal range of motion  Cervical back: Neck supple  Lymphadenopathy:      Cervical: No cervical adenopathy  Skin:     Findings: No erythema or rash  Neurological:      Mental Status: He is alert and oriented to person, place, and time  Cranial Nerves: No cranial nerve deficit  Deep Tendon Reflexes: Reflexes are normal and symmetric  Psychiatric:         Behavior: Behavior normal            Labs: I personally reviewed the labs and imaging pertinent to this patient care

## 2023-06-16 ENCOUNTER — TELEPHONE (OUTPATIENT)
Dept: INTERNAL MEDICINE CLINIC | Facility: CLINIC | Age: 48
End: 2023-06-16

## 2023-07-18 ENCOUNTER — TELEPHONE (OUTPATIENT)
Dept: INTERNAL MEDICINE CLINIC | Facility: CLINIC | Age: 48
End: 2023-07-18

## 2023-07-20 ENCOUNTER — TELEPHONE (OUTPATIENT)
Dept: HEMATOLOGY ONCOLOGY | Facility: CLINIC | Age: 48
End: 2023-07-20

## 2023-07-27 ENCOUNTER — APPOINTMENT (OUTPATIENT)
Dept: LAB | Facility: HOSPITAL | Age: 48
End: 2023-07-27
Payer: COMMERCIAL

## 2023-07-27 DIAGNOSIS — C62.92 TESTICULAR SEMINOMA, LEFT (HCC): ICD-10-CM

## 2023-07-27 LAB
AFP-TM SERPL-MCNC: 4.87 NG/ML (ref 0–9)
ALBUMIN SERPL BCP-MCNC: 4.3 G/DL (ref 3.5–5)
ALP SERPL-CCNC: 77 U/L (ref 34–104)
ALT SERPL W P-5'-P-CCNC: 38 U/L (ref 7–52)
ANION GAP SERPL CALCULATED.3IONS-SCNC: 11 MMOL/L
AST SERPL W P-5'-P-CCNC: 20 U/L (ref 13–39)
BASOPHILS # BLD AUTO: 0.04 THOUSANDS/ÂΜL (ref 0–0.1)
BASOPHILS NFR BLD AUTO: 0 % (ref 0–1)
BILIRUB SERPL-MCNC: 1.1 MG/DL (ref 0.2–1)
BUN SERPL-MCNC: 16 MG/DL (ref 5–25)
CALCIUM SERPL-MCNC: 9.5 MG/DL (ref 8.4–10.2)
CHLORIDE SERPL-SCNC: 103 MMOL/L (ref 96–108)
CO2 SERPL-SCNC: 25 MMOL/L (ref 21–32)
CREAT SERPL-MCNC: 1.11 MG/DL (ref 0.6–1.3)
EOSINOPHIL # BLD AUTO: 0.11 THOUSAND/ÂΜL (ref 0–0.61)
EOSINOPHIL NFR BLD AUTO: 1 % (ref 0–6)
ERYTHROCYTE [DISTWIDTH] IN BLOOD BY AUTOMATED COUNT: 14.1 % (ref 11.6–15.1)
GFR SERPL CREATININE-BSD FRML MDRD: 79 ML/MIN/1.73SQ M
GLUCOSE SERPL-MCNC: 140 MG/DL (ref 65–140)
HCT VFR BLD AUTO: 44.4 % (ref 36.5–49.3)
HGB BLD-MCNC: 15.1 G/DL (ref 12–17)
IMM GRANULOCYTES # BLD AUTO: 0.03 THOUSAND/UL (ref 0–0.2)
IMM GRANULOCYTES NFR BLD AUTO: 0 % (ref 0–2)
LYMPHOCYTES # BLD AUTO: 2.93 THOUSANDS/ÂΜL (ref 0.6–4.47)
LYMPHOCYTES NFR BLD AUTO: 28 % (ref 14–44)
MCH RBC QN AUTO: 30.1 PG (ref 26.8–34.3)
MCHC RBC AUTO-ENTMCNC: 34 G/DL (ref 31.4–37.4)
MCV RBC AUTO: 89 FL (ref 82–98)
MONOCYTES # BLD AUTO: 0.57 THOUSAND/ÂΜL (ref 0.17–1.22)
MONOCYTES NFR BLD AUTO: 5 % (ref 4–12)
NEUTROPHILS # BLD AUTO: 6.92 THOUSANDS/ÂΜL (ref 1.85–7.62)
NEUTS SEG NFR BLD AUTO: 66 % (ref 43–75)
NRBC BLD AUTO-RTO: 0 /100 WBCS
PLATELET # BLD AUTO: 363 THOUSANDS/UL (ref 149–390)
PMV BLD AUTO: 9.8 FL (ref 8.9–12.7)
POTASSIUM SERPL-SCNC: 3.5 MMOL/L (ref 3.5–5.3)
PROT SERPL-MCNC: 7 G/DL (ref 6.4–8.4)
RBC # BLD AUTO: 5.01 MILLION/UL (ref 3.88–5.62)
SODIUM SERPL-SCNC: 139 MMOL/L (ref 135–147)
WBC # BLD AUTO: 10.6 THOUSAND/UL (ref 4.31–10.16)

## 2023-07-27 PROCEDURE — 82105 ALPHA-FETOPROTEIN SERUM: CPT

## 2023-07-27 PROCEDURE — 85025 COMPLETE CBC W/AUTO DIFF WBC: CPT

## 2023-07-27 PROCEDURE — 80053 COMPREHEN METABOLIC PANEL: CPT

## 2023-07-27 PROCEDURE — 36415 COLL VENOUS BLD VENIPUNCTURE: CPT

## 2023-07-28 ENCOUNTER — TELEPHONE (OUTPATIENT)
Dept: HEMATOLOGY ONCOLOGY | Facility: CLINIC | Age: 48
End: 2023-07-28

## 2023-07-28 NOTE — TELEPHONE ENCOUNTER
Appointment Change  Cancel, Reschedule, Change to Virtual      Who are you speaking with? self   If it is not the patient, are they listed on an active communication consent form? self   Which provider is the appointment scheduled with? Ade   When is the appointment scheduled? Please list date and time 7/25   At which location is the appointment scheduled to take place? [place]   Was the appointment rescheduled or changed from an in person visit to a virtual visit? If so, please list the details of the change. Yes, 8/15 3pm   What is the reason for the appointment change? moving   Was [facility] transport scheduled for this visit? no   Does [facility] transport need to be scheduled for the new visit (if applicable) no   Does the patient need an infusion appointment rescheduled? no   Does the patient have an infusion appointment scheduled? If so, when? no   Is the patient undergoing chemotherapy? no   Was the no-show policy reviewed for appointments being changed with less then 24 hours of notice?  yes

## 2023-08-15 ENCOUNTER — OFFICE VISIT (OUTPATIENT)
Dept: HEMATOLOGY ONCOLOGY | Facility: CLINIC | Age: 48
End: 2023-08-15
Payer: MEDICARE

## 2023-08-15 VITALS
HEART RATE: 75 BPM | SYSTOLIC BLOOD PRESSURE: 148 MMHG | BODY MASS INDEX: 36.45 KG/M2 | HEIGHT: 74 IN | WEIGHT: 284 LBS | DIASTOLIC BLOOD PRESSURE: 72 MMHG | TEMPERATURE: 98.6 F | OXYGEN SATURATION: 94 %

## 2023-08-15 DIAGNOSIS — C62.92 TESTICULAR SEMINOMA, LEFT (HCC): Primary | ICD-10-CM

## 2023-08-15 PROCEDURE — 99213 OFFICE O/P EST LOW 20 MIN: CPT | Performed by: INTERNAL MEDICINE

## 2023-08-15 NOTE — PROGRESS NOTES
[facility] HEMATOLOGY ONCOLOGY SPECIALISTS [address] [id number] [phone]    Billy Herrera,1975, [id number]  08/15/23    Discussion:   In summary, this is a 81-year-old male with a history of recurrent seminoma status post chemotherapy in early 2021. Clinically he is doing well. Denies any specific complaints. He has a few episodes of diarrhea per month. Recent CMP and CBC are normal.  aFP is normal.  We will see him back in 6 months for review with repeat imaging and blood work just prior to that visit. I discussed the above with the patient. The patient  voiced understanding and agreement.  ______________________________________________________________________    No chief complaint on file. HPI:  Oncology History Overview Note   Patient presents today for radiation consult for testicular cancer, referred by Dr. Ed Headley. 38year old male with a history of developmental delay,  shunt placed at birth. He was evaluated by his PCP in January 2020 for scrotal swelling. US revealed left testicular mass, he was referred to Urology. 1/15/20 US scrotum and testicles  IMPRESSION:   Markedly enlarged and abnormal left testicle with normal vascular flow on Doppler interrogation. The finding raises the suspicion of infiltrating process and possibly testicular malignancy (perhaps testicular lymphoma) especially given the history of slowly progressive testicular swelling. FINDINGS:   TESTES:    RIGHT testis = 4.5 x 1.7 x 2.8 cm   The right testicle demonstrates normal contour with homogeneous smooth echotexture. No right-sided intratesticular mass lesion or calcifications.   LEFT testis = 15.1 x 9.7 x 14.0 cm  The left testicle is markedly enlarged with profoundly heterogeneous echotexture.     1/17/20 Urology, Dr. Yvan Méndez for left radical orchiectomy and staging imaging.      1/20/20 Left radical orchiectomy     2/3/20 Urology follow-up,  Salvatore Murphy  Reviewed pathology, left testicular seminoma, large tumor 15.5 cm. Scrotal skin was negative for tumor involvement, no tumor was seen at the spermatic cord or the spermatic cord margin, and no penetration through the tunica vaginalis was seen. Plan for CT abd/pelvis and chest xray, recheck lab work. Follow-up to review and referral at that time. 2/5/20 Bloodwork:  AFP tumor marker: 5.0  HCG tumor marker: < 1  HCG, Quantitative: < 0.6      2/7/20 CT Abd/Pelvis  IMPRESSION:   1.  No evidence of metastatic disease in the abdomen or pelvis.   2.  Large collection in the left hemiscrotum with central hyperdensity likely representing hematoma. Clinical correlation recommended.   3.  Postoperative stranding in the left inguinal region with prominent lymph nodes which are likely reactive.   4.  Cholelithiasis. 2/7/20 XR chest pa & lateral  IMPRESSION:   No acute cardiopulmonary disease. 2/17/20 Urology f/u with Dr. Salvatore Murphy  CT negative for metastases, HCG and AFP markers are negative. Refer to Radiation Oncology for consideration of hockey-stick adjuvant radiation therapy. Scrotal hematoma mostly resolved, no signs of infection, follow-up in 3 months. 2/18/20 Multidisciplinary Urology Case Review  Physician Recommended Plan:  Referral to Radiation Oncology and Medical Oncology.       2/21/20 Dr. Hugh Diaz. Favor observation. Prophylactic radiation therapy or chemotherapy are reasonable to consider. Reviewed that likelihood of cure is high, observation is reasonable with a goal of avoiding unnecessary treatment. If recurrent, salvage is generally quite effective. Pt and mother prefer avoiding therapy unless necessary. Plan for follow-up in May with repeat imaging and bloodwork to initiate surveillance.       5/8/20 CT abd/pelvis and CXR  5/12/20 Dr. Salvatore Murphy Urology follow-up  5/15/20 Marion Hernadez follow-up           Testicular seminoma, left [facility]) 1/2020 Initial Diagnosis    Testicular seminoma, left ([address])     1/20/2020 Surgery    Left radical orchiectomy inguinal, patrial scrotectomy (Left)-combination of inguinal and scrotal approach due to size     A. Left testis and spermatic cord:  - Seminoma, 15.5 cm in greatest dimension, with extension beyond tunica albuginea as evidenced by tumor in epididymis and in hilar fat. No penetration through tunica vaginalis is appreciated. - No tumor seen within spermatic cord or at spermatic cord margin  - See synoptic report below     B.  Scrotal skin:  - No tumor seen.        1/18/2021 - 4/9/2021 Chemotherapy    pegfilgrastim (NEULASTA ONPRO), 6 mg, Subcutaneous, Once, 4 of 4 cycles  Administration: 6 mg (1/29/2021), 6 mg (2/22/2021), 6 mg (3/12/2021), 6 mg (4/9/2021)  CISplatin (PLATINOL) infusion, 48.8 mg, Intravenous, Once, 4 of 4 cycles  Administration: 50 mg (1/18/2021), 50 mg (1/19/2021), 50 mg (2/15/2021), 50 mg (1/20/2021), 50 mg (1/28/2021), 50 mg (1/29/2021), 50 mg (2/16/2021), 50 mg (3/8/2021), 50 mg (2/17/2021), 50 mg (2/18/2021), 50 mg (2/22/2021), 50 mg (3/9/2021), 50 mg (4/5/2021), 50 mg (3/10/2021), 50 mg (3/11/2021), 50 mg (3/12/2021), 50 mg (4/9/2021), 50 mg (4/8/2021), 50 mg (4/6/2021), 50 mg (4/7/2021)  fosaprepitant (EMEND) IVPB, 150 mg, Intravenous, Once, 4 of 4 cycles  Administration: 150 mg (1/18/2021), 150 mg (2/15/2021), 150 mg (3/8/2021), 150 mg (4/5/2021), 150 mg (1/28/2021)  etoposide (TOPOSAR) in NSS infusion, 100 mg/m2 = 244 mg, Intravenous, Once, 4 of 4 cycles  Administration: 244 mg (1/18/2021), 244 mg (1/19/2021), 244 mg (2/15/2021), 244 mg (1/20/2021), 244 mg (1/28/2021), 244 mg (1/29/2021), 244 mg (2/16/2021), 244 mg (3/8/2021), 244 mg (2/17/2021), 244 mg (2/18/2021), 244 mg (2/22/2021), 244 mg (3/9/2021), 244 mg (4/5/2021), 244 mg (3/10/2021), 244 mg (3/11/2021), 244 mg (3/12/2021), 244 mg (4/9/2021), 244 mg (4/8/2021), 244 mg (4/6/2021), 244 mg (4/7/2021)         Interval History: Clinically stable. ECOG-  1 - Symptomatic but completely ambulatory    Review of Systems   Constitutional: Negative for chills and fever. HENT: Negative for nosebleeds. Eyes: Negative for discharge. Respiratory: Negative for cough and shortness of breath. Cardiovascular: Negative for chest pain. Gastrointestinal: Positive for diarrhea. Negative for abdominal pain and constipation. Endocrine: Negative for polydipsia. Genitourinary: Negative for hematuria. Musculoskeletal: Negative for arthralgias. Skin: Negative for color change. Allergic/Immunologic: Negative for immunocompromised state. Neurological: Negative for dizziness and headaches. Hematological: Negative for adenopathy. Psychiatric/Behavioral: Negative for agitation.        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)       Current Outpatient Medications:   •  gabapentin (Neurontin) 100 mg capsule, Take 1 capsule (100 mg total) by mouth 3 (three) times a day as needed (right hip pain), Disp: 90 capsule, Rfl: 1  •  magnesium oxide (MAG-OX) 400 mg, Take 1 tablet (400 mg total) by mouth 2 (two) times a day (Patient not taking: Reported on 5/16/2022), Disp: 60 tablet, Rfl: 0  •  meloxicam (MOBIC) 15 mg tablet, Take 1 tablet (15 mg total) by mouth daily (Patient not taking: Reported on 5/16/2022), Disp: 90 tablet, Rfl: 3  •  ondansetron (ZOFRAN) 8 mg tablet, Take 1 tablet (8 mg total) by mouth every 8 (eight) hours as needed for nausea or vomiting (Patient not taking: Reported on 5/16/2022), Disp: 20 tablet, Rfl: 2  No Known Allergies  Past Surgical History:   Procedure Laterality Date   • CLEFT LIP REPAIR     • IR BIOPSY RETROPERITONEUM  12/22/2020   • ORCHIECTOMY Left    • MA ORCHIECTOMY RADICAL TUMOR INGUINAL APPROACH Left 1/20/2020    Procedure: LEFT RADICAL ORCHIECTOMY INGUINAL, patrial scrotectomy;  Surgeon: Brent Hernández MD;  Location: [facility];  Service: Urology     Social History     Objective:  Vitals:    08/15/23 1449   BP: 148/72   BP Location: Left arm   Patient Position: Sitting   Cuff Size: Standard   Pulse: 75   Temp: 98.6 °F (37 °C)   TempSrc: Tympanic   SpO2: 94%   Weight: 129 kg (284 lb)   Height: 6' 2" (1.88 m)     Physical Exam  Constitutional:       Appearance: He is well-developed. HENT:      Head: Normocephalic and atraumatic. Eyes:      Pupils: Pupils are equal, round, and reactive to light. Cardiovascular:      Rate and Rhythm: Normal rate and regular rhythm. Heart sounds: No murmur heard. Pulmonary:      Breath sounds: Normal breath sounds. No wheezing or rales. Abdominal:      Palpations: Abdomen is soft. Tenderness: There is no abdominal tenderness. Musculoskeletal:         General: No tenderness. Normal range of motion. Cervical back: Neck supple. Lymphadenopathy:      Cervical: No cervical adenopathy. Skin:     Findings: No erythema or rash. Neurological:      Mental Status: He is alert and oriented to person, place, and time. Cranial Nerves: No cranial nerve deficit. Deep Tendon Reflexes: Reflexes are normal and symmetric. Psychiatric:         Behavior: Behavior normal.           Labs: I personally reviewed the labs and imaging pertinent to this patient care.

## 2023-11-17 ENCOUNTER — TELEPHONE (OUTPATIENT)
Dept: HEMATOLOGY ONCOLOGY | Facility: CLINIC | Age: 48
End: 2023-11-17

## 2023-11-21 ENCOUNTER — OFFICE VISIT (OUTPATIENT)
Dept: HEMATOLOGY ONCOLOGY | Facility: CLINIC | Age: 48
End: 2023-11-21
Payer: COMMERCIAL

## 2023-11-21 VITALS
HEIGHT: 74 IN | TEMPERATURE: 97.4 F | WEIGHT: 262 LBS | HEART RATE: 67 BPM | SYSTOLIC BLOOD PRESSURE: 136 MMHG | OXYGEN SATURATION: 97 % | DIASTOLIC BLOOD PRESSURE: 89 MMHG | BODY MASS INDEX: 33.62 KG/M2

## 2023-11-21 DIAGNOSIS — C62.92 TESTICULAR SEMINOMA, LEFT (HCC): Primary | ICD-10-CM

## 2023-11-21 PROCEDURE — 99213 OFFICE O/P EST LOW 20 MIN: CPT | Performed by: INTERNAL MEDICINE

## 2023-11-21 NOTE — PROGRESS NOTES
[facility] [address] [address]  [address] [facility]    Torsten Herrera,1975, [id number]  11/21/23    Discussion:   ***    I discussed the above with the patient. The patient *** voiced understanding and agreement.  ______________________________________________________________________    No chief complaint on file. HPI:  Oncology History Overview Note   Patient presents today for radiation consult for testicular cancer, referred by Dr. Shayla Rodriguez. 38year old male with a history of developmental delay,  shunt placed at birth. He was evaluated by his PCP in January 2020 for scrotal swelling. US revealed left testicular mass, he was referred to Urology. 1/15/20 US scrotum and testicles  IMPRESSION:   Markedly enlarged and abnormal left testicle with normal vascular flow on Doppler interrogation. The finding raises the suspicion of infiltrating process and possibly testicular malignancy (perhaps testicular lymphoma) especially given the history of slowly progressive testicular swelling. FINDINGS:   TESTES:    RIGHT testis = 4.5 x 1.7 x 2.8 cm   The right testicle demonstrates normal contour with homogeneous smooth echotexture. No right-sided intratesticular mass lesion or calcifications. LEFT testis = 15.1 x 9.7 x 14.0 cm  The left testicle is markedly enlarged with profoundly heterogeneous echotexture. 1/17/20 Urology, Dr. Gian Perdue for left radical orchiectomy and staging imaging. 1/20/20 Left radical orchiectomy     2/3/20 Urology follow-up, Dr. 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 Scrotal skin was negative for tumor involvement, no tumor was seen at the spermatic cord or the spermatic cord margin, and no penetration through the tunica vaginalis was seen. Plan for CT abd/pelvis and chest xray, recheck lab work. Follow-up to review and referral at that time.      2/5/20 Bloodwork:  AFP tumor marker: 5.0  HCG tumor marker: < 1  HCG, Quantitative: < 0.6      2/7/20 CT Abd/Pelvis  IMPRESSION:   1. No evidence of metastatic disease in the abdomen or pelvis. 2.  Large collection in the left hemiscrotum with central hyperdensity likely representing hematoma. Clinical correlation recommended. 3.  Postoperative stranding in the left inguinal region with prominent lymph nodes which are likely reactive. 4.  Cholelithiasis. 2/7/20 XR chest pa & lateral  IMPRESSION:   No acute cardiopulmonary disease. 2/17/20 Urology f/u with Dr. Luz Mott  CT negative for metastases, HCG and AFP markers are negative. Refer to Radiation Oncology for consideration of hockey-stick adjuvant radiation therapy. Scrotal hematoma mostly resolved, no signs of infection, follow-up in 3 months. 2/18/20 Multidisciplinary Urology Case Review  Physician Recommended Plan:  Referral to Radiation Oncology and Medical Oncology. 2/21/20 Dr. Yenny Perea, Med Onc Consult  Stage IB seminoma. Favor observation. Prophylactic radiation therapy or chemotherapy are reasonable to consider. Reviewed that likelihood of cure is high, observation is reasonable with a goal of avoiding unnecessary treatment. If recurrent, salvage is generally quite effective. Pt and mother prefer avoiding therapy unless necessary. Plan for follow-up in May with repeat imaging and bloodwork to initiate surveillance. 5/8/20 CT abd/pelvis and CXR  5/12/20 Dr. Luz Mott Urology follow-up  5/15/20 Natty Perea follow-up           Testicular seminoma, left [facility])   1/2020 Initial Diagnosis    Testicular seminoma, left ([address] [facility])     1/20/2020 Surgery    Left radical orchiectomy inguinal, patrial scrotectomy (Left)-combination of inguinal and scrotal approach due to size     A. Left testis and spermatic cord:  - Seminoma, 15.5 cm in greatest dimension, with extension beyond tunica albuginea as evidenced by tumor in epididymis and in hilar fat.  No penetration through tunica vaginalis is appreciated. - No tumor seen within spermatic cord or at spermatic cord margin  - See synoptic report below     B. Scrotal skin:  - No tumor seen. 1/18/2021 - 4/9/2021 Chemotherapy    pegfilgrastim (NEULASTA ONPRO), 6 mg, Subcutaneous, Once, 4 of 4 cycles  Administration: 6 mg (1/29/2021), 6 mg (2/22/2021), 6 mg (3/12/2021), 6 mg (4/9/2021)  CISplatin (PLATINOL) infusion, 48.8 mg, Intravenous, Once, 4 of 4 cycles  Administration: 50 mg (1/18/2021), 50 mg (1/19/2021), 50 mg (2/15/2021), 50 mg (1/20/2021), 50 mg (1/28/2021), 50 mg (1/29/2021), 50 mg (2/16/2021), 50 mg (3/8/2021), 50 mg (2/17/2021), 50 mg (2/18/2021), 50 mg (2/22/2021), 50 mg (3/9/2021), 50 mg (4/5/2021), 50 mg (3/10/2021), 50 mg (3/11/2021), 50 mg (3/12/2021), 50 mg (4/9/2021), 50 mg (4/8/2021), 50 mg (4/6/2021), 50 mg (4/7/2021)  fosaprepitant (EMEND) IVPB, 150 mg, Intravenous, Once, 4 of 4 cycles  Administration: 150 mg (1/18/2021), 150 mg (2/15/2021), 150 mg (3/8/2021), 150 mg (4/5/2021), 150 mg (1/28/2021)  etoposide (TOPOSAR) in NSS infusion, 100 mg/m2 = 244 mg, Intravenous, Once, 4 of 4 cycles  Administration: 244 mg (1/18/2021), 244 mg (1/19/2021), 244 mg (2/15/2021), 244 mg (1/20/2021), 244 mg (1/28/2021), 244 mg (1/29/2021), 244 mg (2/16/2021), 244 mg (3/8/2021), 244 mg (2/17/2021), 244 mg (2/18/2021), 244 mg (2/22/2021), 244 mg (3/9/2021), 244 mg (4/5/2021), 244 mg (3/10/2021), 244 mg (3/11/2021), 244 mg (3/12/2021), 244 mg (4/9/2021), 244 mg (4/8/2021), 244 mg (4/6/2021), 244 mg (4/7/2021)         Interval History:  ***  {performance status:04863}    Review of Systems   Constitutional:  Negative for chills and fever. HENT:  Negative for nosebleeds. Eyes:  Negative for discharge. Respiratory:  Negative for cough and shortness of breath. Cardiovascular:  Negative for chest pain. Gastrointestinal:  Negative for abdominal pain, constipation and diarrhea.    Endocrine: Negative for polydipsia. Genitourinary:  Negative for hematuria. Musculoskeletal:  Negative for arthralgias. Skin:  Negative for color change. Allergic/Immunologic: Negative for immunocompromised state. Neurological:  Negative for dizziness and headaches. Hematological:  Negative for adenopathy. Psychiatric/Behavioral:  Negative for agitation.         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)       Current Outpatient Medications:     gabapentin (Neurontin) 100 mg capsule, Take 1 capsule (100 mg total) by mouth 3 (three) times a day as needed (right hip pain), Disp: 90 capsule, Rfl: 1    magnesium oxide (MAG-OX) 400 mg, Take 1 tablet (400 mg total) by mouth 2 (two) times a day (Patient not taking: Reported on 5/16/2022), Disp: 60 tablet, Rfl: 0    meloxicam (MOBIC) 15 mg tablet, Take 1 tablet (15 mg total) by mouth daily (Patient not taking: Reported on 5/16/2022), Disp: 90 tablet, Rfl: 3    ondansetron (ZOFRAN) 8 mg tablet, Take 1 tablet (8 mg total) by mouth every 8 (eight) hours as needed for nausea or vomiting (Patient not taking: Reported on 5/16/2022), Disp: 20 tablet, Rfl: 2  No Known Allergies  Past Surgical History:   Procedure Laterality Date    CLEFT LIP REPAIR      IR BIOPSY RETROPERITONEUM  12/22/2020    ORCHIECTOMY Left     NV ORCHIECTOMY RADICAL TUMOR INGUINAL APPROACH Left 1/20/2020    Procedure: LEFT RADICAL ORCHIECTOMY INGUINAL, patrial scrotectomy;  Surgeon: Amor Pabon MD;  Location: [facility] OR;  Service: Urology     Social History     Objective: There were no vitals filed for this visit. Physical Exam  Constitutional:       Appearance: He is well-developed. HENT:      Head: Normocephalic and atraumatic. Mouth/Throat:      Mouth: Mucous membranes are moist.   Eyes:      Pupils: Pupils are equal, round, and reactive to light. Cardiovascular:      Rate and Rhythm: Normal rate and regular rhythm. Heart sounds: No murmur heard. Pulmonary:      Breath sounds: Normal breath sounds. No wheezing or rales. Abdominal:      Palpations: Abdomen is soft. Tenderness: There is no abdominal tenderness. Musculoskeletal:         General: No tenderness. Normal range of motion. Cervical back: Neck supple. Lymphadenopathy:      Cervical: No cervical adenopathy. Skin:     Findings: No erythema or rash. Neurological:      Mental Status: He is alert and oriented to person, place, and time. Cranial Nerves: No cranial nerve deficit. Deep Tendon Reflexes: Reflexes are normal and symmetric. Psychiatric:         Behavior: Behavior normal.           Labs: I personally reviewed the labs and imaging pertinent to this patient care.

## 2023-11-21 NOTE — PROGRESS NOTES
[address]  [address] [address] [phone]    Aydee Herrera,1975, [id number]  11/21/23    Discussion:   In summary, this is a 67-year-old male with a history of recurrent seminoma 1 year after resection. Status post -16/cisplatin in early 2021. Clinically he is doing well. He has some left hip pain which he tends to have in the winter months over several years. He did not have imaging or blood work done prior to today's visit due to recent move and lost paperwork. He is nearly 4 years from time of initial surgery. We will make arrangements for repeat imaging and blood work in January and again in July. I will see him after the July studies. I discussed the above with the patient. The patient  voiced understanding and agreement.  ______________________________________________________________________    No chief complaint on file. HPI:  Oncology History Overview Note   Patient presents today for radiation consult for testicular cancer, referred by Dr. Corinne Hickory. 38year old male with a history of developmental delay,  shunt placed at birth. He was evaluated by his PCP in January 2020 for scrotal swelling. US revealed left testicular mass, he was referred to Urology. 1/15/20 US scrotum and testicles  IMPRESSION:   Markedly enlarged and abnormal left testicle with normal vascular flow on Doppler interrogation. The finding raises the suspicion of infiltrating process and possibly testicular malignancy (perhaps testicular lymphoma) especially given the history of slowly progressive testicular swelling. FINDINGS:   TESTES:    RIGHT testis = 4.5 x 1.7 x 2.8 cm   The right testicle demonstrates normal contour with homogeneous smooth echotexture. No right-sided intratesticular mass lesion or calcifications.    LEFT testis = 15.1 x 9.7 x 14.0 cm  The left testicle is markedly enlarged with profoundly heterogeneous echotexture. 1/17/20 Urology, Dr. Lorene Schilder for left radical orchiectomy and staging imaging. 1/20/20 Left radical orchiectomy     2/3/20 Urology follow-up, Dr. 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 Scrotal skin was negative for tumor involvement, no tumor was seen at the spermatic cord or the spermatic cord margin, and no penetration through the tunica vaginalis was seen. Plan for CT abd/pelvis and chest xray, recheck lab work. Follow-up to review and referral at that time. 2/5/20 Bloodwork:  AFP tumor marker: 5.0  HCG tumor marker: < 1  HCG, Quantitative: < 0.6      2/7/20 CT Abd/Pelvis  IMPRESSION:   1. No evidence of metastatic disease in the abdomen or pelvis. 2.  Large collection in the left hemiscrotum with central hyperdensity likely representing hematoma. Clinical correlation recommended. 3.  Postoperative stranding in the left inguinal region with prominent lymph nodes which are likely reactive. 4.  Cholelithiasis. 2/7/20 XR chest pa & lateral  IMPRESSION:   No acute cardiopulmonary disease. 2/17/20 Urology f/u with Dr. Shine Winters  CT negative for metastases, HCG and AFP markers are negative. Refer to Radiation Oncology for consideration of hockey-stick adjuvant radiation therapy. Scrotal hematoma mostly resolved, no signs of infection, follow-up in 3 months. 2/18/20 Multidisciplinary Urology Case Review  Physician Recommended Plan:  Referral to Radiation Oncology and Medical Oncology. 2/21/20 Dr. Belem Hernandez, Med Onc Consult  Stage IB seminoma. Favor observation. Prophylactic radiation therapy or chemotherapy are reasonable to consider. Reviewed that likelihood of cure is high, observation is reasonable with a goal of avoiding unnecessary treatment. If recurrent, salvage is generally quite effective. Pt and mother prefer avoiding therapy unless necessary.   Plan for follow-up in May with repeat imaging and bloodwork to initiate surveillance. 5/8/20 CT abd/pelvis and CXR  5/12/20 Dr. Jason Nava Urology follow-up  5/15/20 [place] Hodgkin Dr. Lara Arch follow-up           Testicular seminoma, left [facility])   1/2020 Initial Diagnosis    Testicular seminoma, left ([address])     1/20/2020 Surgery    Left radical orchiectomy inguinal, patrial scrotectomy (Left)-combination of inguinal and scrotal approach due to size     A. Left testis and spermatic cord:  - Seminoma, 15.5 cm in greatest dimension, with extension beyond tunica albuginea as evidenced by tumor in epididymis and in hilar fat. No penetration through tunica vaginalis is appreciated. - No tumor seen within spermatic cord or at spermatic cord margin  - See synoptic report below     B. Scrotal skin:  - No tumor seen.         1/18/2021 - 4/9/2021 Chemotherapy    pegfilgrastim (NEULASTA ONPRO), 6 mg, Subcutaneous, Once, 4 of 4 cycles  Administration: 6 mg (1/29/2021), 6 mg (2/22/2021), 6 mg (3/12/2021), 6 mg (4/9/2021)  CISplatin (PLATINOL) infusion, 48.8 mg, Intravenous, Once, 4 of 4 cycles  Administration: 50 mg (1/18/2021), 50 mg (1/19/2021), 50 mg (2/15/2021), 50 mg (1/20/2021), 50 mg (1/28/2021), 50 mg (1/29/2021), 50 mg (2/16/2021), 50 mg (3/8/2021), 50 mg (2/17/2021), 50 mg (2/18/2021), 50 mg (2/22/2021), 50 mg (3/9/2021), 50 mg (4/5/2021), 50 mg (3/10/2021), 50 mg (3/11/2021), 50 mg (3/12/2021), 50 mg (4/9/2021), 50 mg (4/8/2021), 50 mg (4/6/2021), 50 mg (4/7/2021)  fosaprepitant (EMEND) IVPB, 150 mg, Intravenous, Once, 4 of 4 cycles  Administration: 150 mg (1/18/2021), 150 mg (2/15/2021), 150 mg (3/8/2021), 150 mg (4/5/2021), 150 mg (1/28/2021)  etoposide (TOPOSAR) in NSS infusion, 100 mg/m2 = 244 mg, Intravenous, Once, 4 of 4 cycles  Administration: 244 mg (1/18/2021), 244 mg (1/19/2021), 244 mg (2/15/2021), 244 mg (1/20/2021), 244 mg (1/28/2021), 244 mg (1/29/2021), 244 mg (2/16/2021), 244 mg (3/8/2021), 244 mg (2/17/2021), 244 mg (2/18/2021), 244 mg (2/22/2021), 244 mg (3/9/2021), 244 mg (4/5/2021), 244 mg (3/10/2021), 244 mg (3/11/2021), 244 mg (3/12/2021), 244 mg (4/9/2021), 244 mg (4/8/2021), 244 mg (4/6/2021), 244 mg (4/7/2021)         Interval History: Clinically stable. ECOG-  1 - Symptomatic but completely ambulatory    Review of Systems   Constitutional:  Negative for chills and fever. HENT:  Negative for nosebleeds. Eyes:  Negative for discharge. Respiratory:  Negative for cough and shortness of breath. Cardiovascular:  Negative for chest pain. Gastrointestinal:  Negative for abdominal pain, constipation and diarrhea. Endocrine: Negative for polydipsia. Genitourinary:  Negative for hematuria. Musculoskeletal:  Positive for arthralgias. Skin:  Negative for color change. Allergic/Immunologic: Negative for immunocompromised state. Neurological:  Negative for dizziness and headaches. Hematological:  Negative for adenopathy. Psychiatric/Behavioral:  Negative for agitation.         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)       Current Outpatient Medications:     gabapentin (Neurontin) 100 mg capsule, Take 1 capsule (100 mg total) by mouth 3 (three) times a day as needed (right hip pain), Disp: 90 capsule, Rfl: 1    magnesium oxide (MAG-OX) 400 mg, Take 1 tablet (400 mg total) by mouth 2 (two) times a day (Patient not taking: Reported on 5/16/2022), Disp: 60 tablet, Rfl: 0    meloxicam (MOBIC) 15 mg tablet, Take 1 tablet (15 mg total) by mouth daily (Patient not taking: Reported on 5/16/2022), Disp: 90 tablet, Rfl: 3    ondansetron (ZOFRAN) 8 mg tablet, Take 1 tablet (8 mg total) by mouth every 8 (eight) hours as needed for nausea or vomiting (Patient not taking: Reported on 5/16/2022), Disp: 20 tablet, Rfl: 2  No Known Allergies  Past Surgical History:   Procedure Laterality Date    CLEFT LIP REPAIR      IR BIOPSY RETROPERITONEUM  12/22/2020    ORCHIECTOMY Left     SC ORCHIECTOMY RADICAL TUMOR INGUINAL APPROACH Left 1/20/2020    Procedure: LEFT RADICAL ORCHIECTOMY INGUINAL, patrial scrotectomy;  Surgeon: Rosa Maria Sanchez MD;  Location: [facility];  Service: Urology     Social History     Objective:  Vitals:    11/21/23 0943   BP: 136/89   BP Location: Right arm   Patient Position: Sitting   Cuff Size: Standard   Pulse: 67   Temp: (!) 97.4 °F (36.3 °C)   TempSrc: Tympanic   SpO2: 97%   Weight: 119 kg (262 lb)   Height: 6' 2" (1.88 m)     Physical Exam  Constitutional:       Appearance: He is well-developed. HENT:      Head: Normocephalic and atraumatic. Mouth/Throat:      Mouth: Mucous membranes are moist.   Eyes:      Pupils: Pupils are equal, round, and reactive to light. Cardiovascular:      Rate and Rhythm: Normal rate and regular rhythm. Heart sounds: No murmur heard. Pulmonary:      Breath sounds: Normal breath sounds. No wheezing or rales. Abdominal:      Palpations: Abdomen is soft. Tenderness: There is no abdominal tenderness. Musculoskeletal:         General: No tenderness. Normal range of motion. Cervical back: Neck supple. Lymphadenopathy:      Cervical: No cervical adenopathy. Skin:     Findings: No erythema or rash. Neurological:      Mental Status: He is alert and oriented to person, place, and time. Cranial Nerves: No cranial nerve deficit. Deep Tendon Reflexes: Reflexes are normal and symmetric. Psychiatric:         Behavior: Behavior normal.           Labs:   I personally reviewed the labs and imaging pertinent to this patient care.

## 2024-01-05 ENCOUNTER — HOSPITAL ENCOUNTER (OUTPATIENT)
Dept: RADIOLOGY | Facility: HOSPITAL | Age: 49
Discharge: HOME | End: 2024-01-05
Payer: COMMERCIAL

## 2024-01-05 ENCOUNTER — APPOINTMENT (OUTPATIENT)
Dept: LAB | Facility: HOSPITAL | Age: 49
End: 2024-01-05
Payer: COMMERCIAL

## 2024-01-05 DIAGNOSIS — C62.92 TESTICULAR SEMINOMA, LEFT (HCC): ICD-10-CM

## 2024-01-05 LAB
AFP-TM SERPL-MCNC: 5.57 NG/ML (ref 0–9)
ALBUMIN SERPL BCP-MCNC: 4.4 G/DL (ref 3.5–5)
ALP SERPL-CCNC: 87 U/L (ref 34–104)
ALT SERPL W P-5'-P-CCNC: 33 U/L (ref 7–52)
ANION GAP SERPL CALCULATED.3IONS-SCNC: 10 MMOL/L
AST SERPL W P-5'-P-CCNC: 18 U/L (ref 13–39)
BASOPHILS # BLD AUTO: 0.07 THOUSANDS/ÂΜL (ref 0–0.1)
BASOPHILS NFR BLD AUTO: 1 % (ref 0–1)
BILIRUB SERPL-MCNC: 0.6 MG/DL (ref 0.2–1)
BUN SERPL-MCNC: 19 MG/DL (ref 5–25)
CALCIUM SERPL-MCNC: 9.5 MG/DL (ref 8.4–10.2)
CHLORIDE SERPL-SCNC: 104 MMOL/L (ref 96–108)
CO2 SERPL-SCNC: 24 MMOL/L (ref 21–32)
CREAT SERPL-MCNC: 1.08 MG/DL (ref 0.6–1.3)
EOSINOPHIL # BLD AUTO: 0.15 THOUSAND/ÂΜL (ref 0–0.61)
EOSINOPHIL NFR BLD AUTO: 1 % (ref 0–6)
ERYTHROCYTE [DISTWIDTH] IN BLOOD BY AUTOMATED COUNT: 14 % (ref 11.6–15.1)
GFR SERPL CREATININE-BSD FRML MDRD: 81 ML/MIN/1.73SQ M
GLUCOSE P FAST SERPL-MCNC: 104 MG/DL (ref 65–99)
HCG-TM SERPL-SCNC: <1 MLU/ML
HCT VFR BLD AUTO: 46.7 % (ref 36.5–49.3)
HGB BLD-MCNC: 15.5 G/DL (ref 12–17)
IMM GRANULOCYTES # BLD AUTO: 0.02 THOUSAND/UL (ref 0–0.2)
IMM GRANULOCYTES NFR BLD AUTO: 0 % (ref 0–2)
LYMPHOCYTES # BLD AUTO: 4.51 THOUSANDS/ÂΜL (ref 0.6–4.47)
LYMPHOCYTES NFR BLD AUTO: 41 % (ref 14–44)
MCH RBC QN AUTO: 29 PG (ref 26.8–34.3)
MCHC RBC AUTO-ENTMCNC: 33.2 G/DL (ref 31.4–37.4)
MCV RBC AUTO: 87 FL (ref 82–98)
MONOCYTES # BLD AUTO: 0.69 THOUSAND/ÂΜL (ref 0.17–1.22)
MONOCYTES NFR BLD AUTO: 6 % (ref 4–12)
NEUTROPHILS # BLD AUTO: 5.59 THOUSANDS/ÂΜL (ref 1.85–7.62)
NEUTS SEG NFR BLD AUTO: 51 % (ref 43–75)
NRBC BLD AUTO-RTO: 0 /100 WBCS
PLATELET # BLD AUTO: 369 THOUSANDS/UL (ref 149–390)
PMV BLD AUTO: 10 FL (ref 8.9–12.7)
POTASSIUM SERPL-SCNC: 3.8 MMOL/L (ref 3.5–5.3)
PROT SERPL-MCNC: 7.2 G/DL (ref 6.4–8.4)
RBC # BLD AUTO: 5.35 MILLION/UL (ref 3.88–5.62)
SODIUM SERPL-SCNC: 138 MMOL/L (ref 135–147)
WBC # BLD AUTO: 11.03 THOUSAND/UL (ref 4.31–10.16)

## 2024-01-05 PROCEDURE — 36415 COLL VENOUS BLD VENIPUNCTURE: CPT

## 2024-01-05 PROCEDURE — 71046 X-RAY EXAM CHEST 2 VIEWS: CPT

## 2024-01-05 PROCEDURE — 82105 ALPHA-FETOPROTEIN SERUM: CPT

## 2024-01-05 PROCEDURE — 80053 COMPREHEN METABOLIC PANEL: CPT

## 2024-01-05 PROCEDURE — 84702 CHORIONIC GONADOTROPIN TEST: CPT

## 2024-01-05 PROCEDURE — 85025 COMPLETE CBC W/AUTO DIFF WBC: CPT

## 2024-01-10 ENCOUNTER — HOSPITAL ENCOUNTER (OUTPATIENT)
Dept: CT IMAGING | Facility: HOSPITAL | Age: 49
Discharge: HOME | End: 2024-01-10
Attending: INTERNAL MEDICINE
Payer: COMMERCIAL

## 2024-01-10 DIAGNOSIS — C62.92 TESTICULAR SEMINOMA, LEFT (HCC): ICD-10-CM

## 2024-01-10 PROCEDURE — 71260 CT THORAX DX C+: CPT

## 2024-01-10 PROCEDURE — 74177 CT ABD & PELVIS W/CONTRAST: CPT

## 2024-01-10 PROCEDURE — G1004 CDSM NDSC: HCPCS

## 2024-01-10 RX ADMIN — IOHEXOL 100 ML: 350 INJECTION, SOLUTION INTRAVENOUS at 08:12

## 2024-01-31 ENCOUNTER — APPOINTMENT (OUTPATIENT)
Dept: LAB | Facility: HOSPITAL | Age: 49
End: 2024-01-31
Payer: COMMERCIAL

## 2024-01-31 DIAGNOSIS — C62.92 TESTICULAR SEMINOMA, LEFT (HCC): ICD-10-CM

## 2024-01-31 LAB
AFP-TM SERPL-MCNC: 5.37 NG/ML (ref 0–9)
ALBUMIN SERPL BCP-MCNC: 4.6 G/DL (ref 3.5–5)
ALP SERPL-CCNC: 85 U/L (ref 34–104)
ALT SERPL W P-5'-P-CCNC: 31 U/L (ref 7–52)
ANION GAP SERPL CALCULATED.3IONS-SCNC: 13 MMOL/L
AST SERPL W P-5'-P-CCNC: 18 U/L (ref 13–39)
BASOPHILS # BLD AUTO: 0.08 THOUSANDS/ÂΜL (ref 0–0.1)
BASOPHILS NFR BLD AUTO: 1 % (ref 0–1)
BILIRUB SERPL-MCNC: 1.2 MG/DL (ref 0.2–1)
BUN SERPL-MCNC: 16 MG/DL (ref 5–25)
CALCIUM SERPL-MCNC: 10 MG/DL (ref 8.4–10.2)
CHLORIDE SERPL-SCNC: 104 MMOL/L (ref 96–108)
CO2 SERPL-SCNC: 23 MMOL/L (ref 21–32)
CREAT SERPL-MCNC: 1.26 MG/DL (ref 0.6–1.3)
EOSINOPHIL # BLD AUTO: 0.19 THOUSAND/ÂΜL (ref 0–0.61)
EOSINOPHIL NFR BLD AUTO: 2 % (ref 0–6)
ERYTHROCYTE [DISTWIDTH] IN BLOOD BY AUTOMATED COUNT: 14.3 % (ref 11.6–15.1)
GFR SERPL CREATININE-BSD FRML MDRD: 67 ML/MIN/1.73SQ M
GLUCOSE P FAST SERPL-MCNC: 114 MG/DL (ref 65–99)
HCT VFR BLD AUTO: 49.9 % (ref 36.5–49.3)
HGB BLD-MCNC: 16.8 G/DL (ref 12–17)
IMM GRANULOCYTES # BLD AUTO: 0.03 THOUSAND/UL (ref 0–0.2)
IMM GRANULOCYTES NFR BLD AUTO: 0 % (ref 0–2)
LYMPHOCYTES # BLD AUTO: 3.91 THOUSANDS/ÂΜL (ref 0.6–4.47)
LYMPHOCYTES NFR BLD AUTO: 35 % (ref 14–44)
MCH RBC QN AUTO: 29.6 PG (ref 26.8–34.3)
MCHC RBC AUTO-ENTMCNC: 33.7 G/DL (ref 31.4–37.4)
MCV RBC AUTO: 88 FL (ref 82–98)
MONOCYTES # BLD AUTO: 0.73 THOUSAND/ÂΜL (ref 0.17–1.22)
MONOCYTES NFR BLD AUTO: 7 % (ref 4–12)
NEUTROPHILS # BLD AUTO: 6.36 THOUSANDS/ÂΜL (ref 1.85–7.62)
NEUTS SEG NFR BLD AUTO: 55 % (ref 43–75)
NRBC BLD AUTO-RTO: 0 /100 WBCS
PLATELET # BLD AUTO: 409 THOUSANDS/UL (ref 149–390)
PMV BLD AUTO: 10.1 FL (ref 8.9–12.7)
POTASSIUM SERPL-SCNC: 4 MMOL/L (ref 3.5–5.3)
PROT SERPL-MCNC: 7.7 G/DL (ref 6.4–8.4)
RBC # BLD AUTO: 5.67 MILLION/UL (ref 3.88–5.62)
SODIUM SERPL-SCNC: 140 MMOL/L (ref 135–147)
WBC # BLD AUTO: 11.3 THOUSAND/UL (ref 4.31–10.16)

## 2024-01-31 PROCEDURE — 85025 COMPLETE CBC W/AUTO DIFF WBC: CPT

## 2024-01-31 PROCEDURE — 82105 ALPHA-FETOPROTEIN SERUM: CPT

## 2024-01-31 PROCEDURE — 36415 COLL VENOUS BLD VENIPUNCTURE: CPT

## 2024-01-31 PROCEDURE — 80053 COMPREHEN METABOLIC PANEL: CPT

## 2024-07-03 ENCOUNTER — APPOINTMENT (OUTPATIENT)
Dept: LAB | Facility: HOSPITAL | Age: 49
End: 2024-07-03
Attending: INTERNAL MEDICINE
Payer: MEDICARE

## 2024-07-03 DIAGNOSIS — C62.92 TESTICULAR SEMINOMA, LEFT (HCC): ICD-10-CM

## 2024-07-03 LAB
AFP-TM SERPL-MCNC: 5.16 NG/ML (ref 0–9)
ALBUMIN SERPL BCG-MCNC: 4.5 G/DL (ref 3.5–5)
ALP SERPL-CCNC: 88 U/L (ref 34–104)
ALT SERPL W P-5'-P-CCNC: 30 U/L (ref 7–52)
ANION GAP SERPL CALCULATED.3IONS-SCNC: 12 MMOL/L (ref 4–13)
AST SERPL W P-5'-P-CCNC: 16 U/L (ref 13–39)
BASOPHILS # BLD AUTO: 0.08 THOUSANDS/ÂΜL (ref 0–0.1)
BASOPHILS NFR BLD AUTO: 1 % (ref 0–1)
BILIRUB SERPL-MCNC: 1.37 MG/DL (ref 0.2–1)
BUN SERPL-MCNC: 14 MG/DL (ref 5–25)
CALCIUM SERPL-MCNC: 9.8 MG/DL (ref 8.4–10.2)
CHLORIDE SERPL-SCNC: 104 MMOL/L (ref 96–108)
CO2 SERPL-SCNC: 23 MMOL/L (ref 21–32)
CREAT SERPL-MCNC: 1.05 MG/DL (ref 0.6–1.3)
EOSINOPHIL # BLD AUTO: 0.12 THOUSAND/ÂΜL (ref 0–0.61)
EOSINOPHIL NFR BLD AUTO: 1 % (ref 0–6)
ERYTHROCYTE [DISTWIDTH] IN BLOOD BY AUTOMATED COUNT: 14.3 % (ref 11.6–15.1)
GFR SERPL CREATININE-BSD FRML MDRD: 84 ML/MIN/1.73SQ M
GLUCOSE P FAST SERPL-MCNC: 105 MG/DL (ref 65–99)
HCG-TM SERPL-SCNC: 0.6 MLU/ML
HCT VFR BLD AUTO: 47.1 % (ref 36.5–49.3)
HGB BLD-MCNC: 15.7 G/DL (ref 12–17)
IMM GRANULOCYTES # BLD AUTO: 0.02 THOUSAND/UL (ref 0–0.2)
IMM GRANULOCYTES NFR BLD AUTO: 0 % (ref 0–2)
LYMPHOCYTES # BLD AUTO: 3.42 THOUSANDS/ÂΜL (ref 0.6–4.47)
LYMPHOCYTES NFR BLD AUTO: 34 % (ref 14–44)
MCH RBC QN AUTO: 29.3 PG (ref 26.8–34.3)
MCHC RBC AUTO-ENTMCNC: 33.3 G/DL (ref 31.4–37.4)
MCV RBC AUTO: 88 FL (ref 82–98)
MONOCYTES # BLD AUTO: 0.69 THOUSAND/ÂΜL (ref 0.17–1.22)
MONOCYTES NFR BLD AUTO: 7 % (ref 4–12)
NEUTROPHILS # BLD AUTO: 5.75 THOUSANDS/ÂΜL (ref 1.85–7.62)
NEUTS SEG NFR BLD AUTO: 57 % (ref 43–75)
NRBC BLD AUTO-RTO: 0 /100 WBCS
PLATELET # BLD AUTO: 357 THOUSANDS/UL (ref 149–390)
PMV BLD AUTO: 10.2 FL (ref 8.9–12.7)
POTASSIUM SERPL-SCNC: 3.8 MMOL/L (ref 3.5–5.3)
PROT SERPL-MCNC: 7.3 G/DL (ref 6.4–8.4)
RBC # BLD AUTO: 5.36 MILLION/UL (ref 3.88–5.62)
SODIUM SERPL-SCNC: 139 MMOL/L (ref 135–147)
WBC # BLD AUTO: 10.08 THOUSAND/UL (ref 4.31–10.16)

## 2024-07-03 PROCEDURE — 36415 COLL VENOUS BLD VENIPUNCTURE: CPT

## 2024-07-03 PROCEDURE — 80053 COMPREHEN METABOLIC PANEL: CPT

## 2024-07-03 PROCEDURE — 85025 COMPLETE CBC W/AUTO DIFF WBC: CPT

## 2024-07-03 PROCEDURE — 82105 ALPHA-FETOPROTEIN SERUM: CPT

## 2024-07-03 PROCEDURE — 84702 CHORIONIC GONADOTROPIN TEST: CPT

## 2024-07-10 ENCOUNTER — HOSPITAL ENCOUNTER (OUTPATIENT)
Dept: CT IMAGING | Facility: HOSPITAL | Age: 49
Discharge: HOME | End: 2024-07-10
Attending: INTERNAL MEDICINE
Payer: MEDICARE

## 2024-07-10 DIAGNOSIS — C62.92 TESTICULAR SEMINOMA, LEFT (HCC): ICD-10-CM

## 2024-07-10 PROCEDURE — 74177 CT ABD & PELVIS W/CONTRAST: CPT

## 2024-07-10 PROCEDURE — G1004 CDSM NDSC: HCPCS

## 2024-07-10 RX ADMIN — IOHEXOL 100 ML: 350 INJECTION, SOLUTION INTRAVENOUS at 08:35

## 2024-07-23 ENCOUNTER — OFFICE VISIT (OUTPATIENT)
Dept: HEMATOLOGY ONCOLOGY | Facility: CLINIC | Age: 49
End: 2024-07-23
Payer: MEDICARE

## 2024-07-23 VITALS
SYSTOLIC BLOOD PRESSURE: 144 MMHG | HEART RATE: 72 BPM | HEIGHT: 74 IN | OXYGEN SATURATION: 96 % | BODY MASS INDEX: 34.91 KG/M2 | WEIGHT: 272 LBS | DIASTOLIC BLOOD PRESSURE: 92 MMHG | TEMPERATURE: 98 F

## 2024-07-23 DIAGNOSIS — C62.92 TESTICULAR SEMINOMA, LEFT (HCC): Primary | ICD-10-CM

## 2024-07-23 PROBLEM — Z76.89 PREVENTION OF CHEMOTHERAPY-INDUCED NEUTROPENIA: Status: RESOLVED | Noted: 2021-01-11 | Resolved: 2024-07-23

## 2024-07-23 PROCEDURE — 99213 OFFICE O/P EST LOW 20 MIN: CPT | Performed by: INTERNAL MEDICINE

## 2024-07-23 PROCEDURE — G2211 COMPLEX E/M VISIT ADD ON: HCPCS | Performed by: INTERNAL MEDICINE

## 2024-07-23 NOTE — PROGRESS NOTES
[facility] [place]  [address] [facility] [address]  [phone]  [phone]    Claus Herrera,1975, [id number]  07/23/24    Discussion:   In summary, this is a 48-year-old male with a history of seminoma, stage I.  Recurred in late 2020 retroperitoneum, needle biopsy.  He was treated with -16, cisplatin x 4 ending April 2021.  Recent CBC and differential are normal.  CMP shows glucose 105, bilirubin 1.3, stable.  aFP and hCG both normal.  CT of the abdomen and pelvis show diverticulosis, cholelithiasis.  No evidence of recurrent disease.  Clinically, he is doing well.  No new symptoms to report.  No evidence recurrence at this time.  Follow-up in 1 year with repeat imaging and blood work.  I discussed the above with the patient.  The patient  voiced understanding and agreement.  ______________________________________________________________________    No chief complaint on file.      HPI:  Oncology History Overview Note   Patient presents today for radiation consult for testicular cancer, referred by Dr. Vilchis.    44 year old male with a history of developmental delay,  shunt placed at birth. He was evaluated by his PCP in January 2020 for scrotal swelling. US revealed left testicular mass, he was referred to Urology.    1/15/20 US scrotum and testicles  IMPRESSION:   Markedly enlarged and abnormal left testicle with normal vascular flow on Doppler interrogation.  The finding raises the suspicion of infiltrating process and possibly testicular malignancy (perhaps testicular lymphoma) especially given the history of slowly progressive testicular swelling.  FINDINGS:   TESTES:    RIGHT testis = 4.5 x 1.7 x 2.8 cm   The right testicle demonstrates normal contour with homogeneous smooth echotexture.  No right-sided intratesticular mass lesion or calcifications.   LEFT testis = 15.1 x 9.7 x 14.0 cm  The left testicle is markedly enlarged with profoundly heterogeneous  echotexture.     1/17/20 Urology, Dr. Vilchis  Plan for left radical orchiectomy and staging imaging.     1/20/20 Left radical orchiectomy     2/3/20 Urology follow-up, Dr. 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 Scrotal skin was negative for tumor involvement, no tumor was seen at the spermatic cord or the spermatic cord margin, and no penetration through the tunica vaginalis was seen.    Plan for CT abd/pelvis and chest xray, recheck lab work. Follow-up to review and referral at that time.     2/5/20 Bloodwork:  AFP tumor marker: 5.0  HCG tumor marker: < 1  HCG, Quantitative: < 0.6      2/7/20 CT Abd/Pelvis  IMPRESSION:   1.  No evidence of metastatic disease in the abdomen or pelvis.   2.  Large collection in the left hemiscrotum with central hyperdensity likely representing hematoma.  Clinical correlation recommended.   3.  Postoperative stranding in the left inguinal region with prominent lymph nodes which are likely reactive.   4.  Cholelithiasis.     2/7/20 XR chest pa & lateral  IMPRESSION:   No acute cardiopulmonary disease.    2/17/20 Urology f/u with Dr. Vilchis  CT negative for metastases, HCG and AFP markers are negative.   Refer to Radiation Oncology for consideration of hockey-stick adjuvant radiation therapy.   Scrotal hematoma mostly resolved, no signs of infection, follow-up in 3 months.    2/18/20 Multidisciplinary Urology Case Review  Physician Recommended Plan:  Referral to Radiation Oncology and Medical Oncology.       2/21/20 Dr. Booker, Med Onc Consult  Stage IB seminoma. Favor observation. Prophylactic radiation therapy or chemotherapy are reasonable to consider.   Reviewed that likelihood of cure is high, observation is reasonable with a goal of avoiding unnecessary treatment. If recurrent, salvage is generally quite effective.   Pt and mother prefer avoiding therapy unless necessary.  Plan for follow-up in May with repeat imaging and bloodwork to initiate  surveillance.      5/8/20 CT abd/pelvis and CXR  5/12/20 Dr. Vilchis Urology follow-up  5/15/20 Med Onc Dr. Booker follow-up           Testicular seminoma, left (HCC)   1/2020 Initial Diagnosis    Testicular seminoma, left (HCC)     1/20/2020 Surgery    Left radical orchiectomy inguinal, patrial scrotectomy (Left)-combination of inguinal and scrotal approach due to size     A. Left testis and spermatic cord:  - Seminoma, 15.5 cm in greatest dimension, with extension beyond tunica albuginea as evidenced by tumor in epididymis and in hilar fat. No penetration through tunica vaginalis is appreciated.  - No tumor seen within spermatic cord or at spermatic cord margin  - See synoptic report below     B. Scrotal skin:  - No tumor seen.        1/18/2021 - 4/9/2021 Chemotherapy    pegfilgrastim (NEULASTA ONPRO), 6 mg, Subcutaneous, Once, 4 of 4 cycles  Administration: 6 mg (1/29/2021), 6 mg (2/22/2021), 6 mg (3/12/2021), 6 mg (4/9/2021)  CISplatin (PLATINOL) infusion, 48.8 mg, Intravenous, Once, 4 of 4 cycles  Administration: 50 mg (1/18/2021), 50 mg (1/19/2021), 50 mg (2/15/2021), 50 mg (1/20/2021), 50 mg (1/28/2021), 50 mg (1/29/2021), 50 mg (2/16/2021), 50 mg (3/8/2021), 50 mg (2/17/2021), 50 mg (2/18/2021), 50 mg (2/22/2021), 50 mg (3/9/2021), 50 mg (4/5/2021), 50 mg (3/10/2021), 50 mg (3/11/2021), 50 mg (3/12/2021), 50 mg (4/9/2021), 50 mg (4/8/2021), 50 mg (4/6/2021), 50 mg (4/7/2021)  fosaprepitant (EMEND) IVPB, 150 mg, Intravenous, Once, 4 of 4 cycles  Administration: 150 mg (1/18/2021), 150 mg (2/15/2021), 150 mg (3/8/2021), 150 mg (4/5/2021), 150 mg (1/28/2021)  etoposide (TOPOSAR) in NSS infusion, 100 mg/m2 = 244 mg, Intravenous, Once, 4 of 4 cycles  Administration: 244 mg (1/18/2021), 244 mg (1/19/2021), 244 mg (2/15/2021), 244 mg (1/20/2021), 244 mg (1/28/2021), 244 mg (1/29/2021), 244 mg (2/16/2021), 244 mg (3/8/2021), 244 mg (2/17/2021), 244 mg (2/18/2021), 244 mg (2/22/2021), 244 mg (3/9/2021), 244 mg  (4/5/2021), 244 mg (3/10/2021), 244 mg (3/11/2021), 244 mg (3/12/2021), 244 mg (4/9/2021), 244 mg (4/8/2021), 244 mg (4/6/2021), 244 mg (4/7/2021)         Interval History: Clinically stable.  ECOG-  0 - Asymptomatic    Review of Systems   Constitutional:  Negative for chills and fever.   HENT:  Negative for nosebleeds.    Eyes:  Negative for discharge.   Respiratory:  Negative for cough and shortness of breath.    Cardiovascular:  Negative for chest pain.   Gastrointestinal:  Negative for abdominal pain, constipation and diarrhea.   Endocrine: Negative for polydipsia.   Genitourinary:  Negative for hematuria.   Musculoskeletal:  Negative for arthralgias.   Skin:  Negative for color change.   Allergic/Immunologic: Negative for immunocompromised state.   Neurological:  Negative for dizziness and headaches.   Hematological:  Negative for adenopathy.   Psychiatric/Behavioral:  Negative for agitation.        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)       Current Outpatient Medications:     gabapentin (Neurontin) 100 mg capsule, Take 1 capsule (100 mg total) by mouth 3 (three) times a day as needed (right hip pain), Disp: 90 capsule, Rfl: 1    magnesium oxide (MAG-OX) 400 mg, Take 1 tablet (400 mg total) by mouth 2 (two) times a day (Patient not taking: Reported on 5/16/2022), Disp: 60 tablet, Rfl: 0    meloxicam (MOBIC) 15 mg tablet, Take 1 tablet (15 mg total) by mouth daily (Patient not taking: Reported on  5/16/2022), Disp: 90 tablet, Rfl: 3    ondansetron (ZOFRAN) 8 mg tablet, Take 1 tablet (8 mg total) by mouth every 8 (eight) hours as needed for nausea or vomiting (Patient not taking: Reported on 5/16/2022), Disp: 20 tablet, Rfl: 2  No Known Allergies  Past Surgical History:   Procedure Laterality Date    CLEFT LIP REPAIR      IR BIOPSY RETROPERITONEUM  12/22/2020    ORCHIECTOMY Left     NE ORCHIECTOMY RADICAL TUMOR INGUINAL APPROACH Left 1/20/2020    Procedure: LEFT RADICAL ORCHIECTOMY INGUINAL, patrial scrotectomy;  Surgeon: Niels Vilchis MD;  Location: [facility];  Service: Urology     Social History     Objective:  There were no vitals filed for this visit.  Physical Exam  Constitutional:       Appearance: He is well-developed.   HENT:      Head: Normocephalic and atraumatic.      Mouth/Throat:      Mouth: Mucous membranes are moist.   Eyes:      Pupils: Pupils are equal, round, and reactive to light.   Cardiovascular:      Rate and Rhythm: Normal rate and regular rhythm.      Heart sounds: No murmur heard.  Pulmonary:      Breath sounds: Normal breath sounds. No wheezing or rales.   Abdominal:      Palpations: Abdomen is soft.      Tenderness: There is no abdominal tenderness.   Musculoskeletal:         General: No tenderness. Normal range of motion.      Cervical back: Neck supple.   Lymphadenopathy:      Cervical: No cervical adenopathy.   Skin:     Findings: No erythema or rash.   Neurological:      Mental Status: He is alert and oriented to person, place, and time.      Cranial Nerves: No cranial nerve deficit.      Deep Tendon Reflexes: Reflexes are normal and symmetric.   Psychiatric:         Behavior: Behavior normal.           Labs:  I personally reviewed the labs and imaging pertinent to this patient care.

## 2025-02-20 ENCOUNTER — HOSPITAL ENCOUNTER (EMERGENCY)
Facility: HOSPITAL | Age: 50
Discharge: HOME | End: 2025-02-20
Attending: EMERGENCY MEDICINE
Payer: MEDICARE

## 2025-02-20 ENCOUNTER — APPOINTMENT (EMERGENCY)
Dept: RADIOLOGY | Facility: HOSPITAL | Age: 50
End: 2025-02-20
Payer: MEDICARE

## 2025-02-20 VITALS
RESPIRATION RATE: 17 BRPM | OXYGEN SATURATION: 93 % | TEMPERATURE: 98.6 F | DIASTOLIC BLOOD PRESSURE: 85 MMHG | HEART RATE: 62 BPM | SYSTOLIC BLOOD PRESSURE: 140 MMHG

## 2025-02-20 DIAGNOSIS — R07.89 ATYPICAL CHEST PAIN: Primary | ICD-10-CM

## 2025-02-20 LAB
ALBUMIN SERPL BCG-MCNC: 4.9 G/DL (ref 3.5–5)
ALP SERPL-CCNC: 82 U/L (ref 34–104)
ALT SERPL W P-5'-P-CCNC: 31 U/L (ref 7–52)
ANION GAP SERPL CALCULATED.3IONS-SCNC: 9 MMOL/L (ref 4–13)
AST SERPL W P-5'-P-CCNC: 17 U/L (ref 13–39)
ATRIAL RATE: 59 BPM
ATRIAL RATE: 59 BPM
BASOPHILS # BLD AUTO: 0.08 THOUSANDS/ΜL (ref 0–0.1)
BASOPHILS NFR BLD AUTO: 1 % (ref 0–1)
BILIRUB SERPL-MCNC: 0.97 MG/DL (ref 0.2–1)
BUN SERPL-MCNC: 20 MG/DL (ref 5–25)
CALCIUM SERPL-MCNC: 9.9 MG/DL (ref 8.4–10.2)
CARDIAC TROPONIN I PNL SERPL HS: 3 NG/L (ref ?–50)
CHLORIDE SERPL-SCNC: 100 MMOL/L (ref 96–108)
CO2 SERPL-SCNC: 31 MMOL/L (ref 21–32)
CREAT SERPL-MCNC: 1.05 MG/DL (ref 0.6–1.3)
EOSINOPHIL # BLD AUTO: 0.19 THOUSAND/ΜL (ref 0–0.61)
EOSINOPHIL NFR BLD AUTO: 2 % (ref 0–6)
ERYTHROCYTE [DISTWIDTH] IN BLOOD BY AUTOMATED COUNT: 14.7 % (ref 11.6–15.1)
GFR SERPL CREATININE-BSD FRML MDRD: 83 ML/MIN/1.73SQ M
GLUCOSE SERPL-MCNC: 103 MG/DL (ref 65–140)
HCT VFR BLD AUTO: 50.3 % (ref 36.5–49.3)
HGB BLD-MCNC: 16.9 G/DL (ref 12–17)
IMM GRANULOCYTES # BLD AUTO: 0.03 THOUSAND/UL (ref 0–0.2)
IMM GRANULOCYTES NFR BLD AUTO: 0 % (ref 0–2)
LYMPHOCYTES # BLD AUTO: 4.38 THOUSANDS/ΜL (ref 0.6–4.47)
LYMPHOCYTES NFR BLD AUTO: 38 % (ref 14–44)
MCH RBC QN AUTO: 29.7 PG (ref 26.8–34.3)
MCHC RBC AUTO-ENTMCNC: 33.6 G/DL (ref 31.4–37.4)
MCV RBC AUTO: 88 FL (ref 82–98)
MONOCYTES # BLD AUTO: 0.78 THOUSAND/ΜL (ref 0.17–1.22)
MONOCYTES NFR BLD AUTO: 7 % (ref 4–12)
NEUTROPHILS # BLD AUTO: 6.02 THOUSANDS/ΜL (ref 1.85–7.62)
NEUTS SEG NFR BLD AUTO: 52 % (ref 43–75)
NRBC BLD AUTO-RTO: 0 /100 WBCS
P AXIS: 39 DEGREES
P AXIS: 41 DEGREES
PLATELET # BLD AUTO: 359 THOUSANDS/UL (ref 149–390)
PMV BLD AUTO: 9.9 FL (ref 8.9–12.7)
POTASSIUM SERPL-SCNC: 3.7 MMOL/L (ref 3.5–5.3)
PR INTERVAL: 138 MS
PR INTERVAL: 142 MS
PROT SERPL-MCNC: 8 G/DL (ref 6.4–8.4)
QRS AXIS: 5 DEGREES
QRS AXIS: 6 DEGREES
QRSD INTERVAL: 94 MS
QRSD INTERVAL: 94 MS
QT INTERVAL: 444 MS
QT INTERVAL: 444 MS
QTC INTERVAL: 439 MS
QTC INTERVAL: 439 MS
RBC # BLD AUTO: 5.69 MILLION/UL (ref 3.88–5.62)
SODIUM SERPL-SCNC: 140 MMOL/L (ref 135–147)
T WAVE AXIS: 55 DEGREES
T WAVE AXIS: 60 DEGREES
VENTRICULAR RATE: 59 BPM
VENTRICULAR RATE: 59 BPM
WBC # BLD AUTO: 11.48 THOUSAND/UL (ref 4.31–10.16)

## 2025-02-20 PROCEDURE — 85025 COMPLETE CBC W/AUTO DIFF WBC: CPT | Performed by: EMERGENCY MEDICINE

## 2025-02-20 PROCEDURE — 99285 EMERGENCY DEPT VISIT HI MDM: CPT

## 2025-02-20 PROCEDURE — 36415 COLL VENOUS BLD VENIPUNCTURE: CPT | Performed by: EMERGENCY MEDICINE

## 2025-02-20 PROCEDURE — 93010 ELECTROCARDIOGRAM REPORT: CPT | Performed by: INTERNAL MEDICINE

## 2025-02-20 PROCEDURE — 84484 ASSAY OF TROPONIN QUANT: CPT | Performed by: EMERGENCY MEDICINE

## 2025-02-20 PROCEDURE — 93005 ELECTROCARDIOGRAM TRACING: CPT

## 2025-02-20 PROCEDURE — 71046 X-RAY EXAM CHEST 2 VIEWS: CPT

## 2025-02-20 PROCEDURE — 80053 COMPREHEN METABOLIC PANEL: CPT | Performed by: EMERGENCY MEDICINE

## 2025-02-20 PROCEDURE — 99285 EMERGENCY DEPT VISIT HI MDM: CPT | Performed by: EMERGENCY MEDICINE

## 2025-02-20 NOTE — ED PROVIDER NOTES
Time reflects when diagnosis was documented in both MDM as applicable and the Disposition within this note       Time User Action Codes Description Comment    2/20/2025  8:00 AM [place] Theodore Add [R07.89] Atypical chest pain           ED Disposition       ED Disposition   Discharge    Condition   Stable    Date/Time   Thu Feb 20, 2025  8:00 AM    Comment   Claus Herrera discharge to home/self care.                   Assessment & Plan       Medical Decision Making  Amount and/or Complexity of Data Reviewed  Labs: ordered.  Radiology: ordered.      49-year-old male with history of  shunt presenting for evaluation of chest pain since last night, intermittent lasting for a few minutes, no other associated symptoms.  Not associated with exertion.  Patient is overall well-appearing, not having chest pain at this time.  Will obtain EKG to evaluate for ACS or arrhythmia, CBC to evaluate for anemia, CMP to evaluate for metabolic abnormality, chest x-ray to evaluate for pneumothorax.  Reviewed labs, no marked abnormalities.  Reviewed and interpreted EKG, shows normal sinus rhythm without acute ischemic changes, reviewed and interpreted x-ray, no acute cardiopulmonary disease.  Reassessed patient, he is still asymptomatic.  This may be pleurisy versus musculoskeletal pain vs GI related.  Recommended Motrin and Tylenol for pain.  Recommend patient follow-up with his primary care doctor.  Discussed with patient strict return precautions.  Patient expressed understanding was agreeable for discharge.       Medications - No data to display    ED Risk Strat Scores      HEART Risk Score      Flowsheet Row Most Recent Value   Heart Score Risk Calculator    History 0 Filed at: 02/20/2025 1208   ECG 0 Filed at: 02/20/2025 1208   Age 0 Filed at: 02/20/2025 1208   Risk Factors 0 Filed at: 02/20/2025 1208   Troponin 0 Filed at: 02/20/2025 1208   HEART Score 0 Filed at: 02/20/2025 1208                              SBIRT 20yo+       Flowsheet Row Most Recent Value   Initial Alcohol Screen: US AUDIT-C     1. How often do you have a drink containing alcohol? 0 Filed at: 02/20/2025 0705   2. How many drinks containing alcohol do you have on a typical day you are drinking?  0 Filed at: 02/20/2025 0705   3a. Male UNDER 65: How often do you have five or more drinks on one occasion? 0 Filed at: 02/20/2025 0705   Audit-C Score 0 Filed at: 02/20/2025 0705   ROSARIO: How many times in the past year have you...    Used an illegal drug or used a prescription medication for non-medical reasons? Never Filed at: 02/20/2025 0705                            History of Present Illness       Chief Complaint   Patient presents with    Chest Pain     Left sided chest pain that started last night. Denies any shortness of breath.        Past Medical History:   Diagnosis Date    Cleft hard palate     Mass of left testicle     Seizures (HCC)     Testicular mass 1/16/2020    Added automatically from request for surgery [id number]      Past Surgical History:   Procedure Laterality Date    CLEFT LIP REPAIR      IR BIOPSY RETROPERITONEUM  12/22/2020    ORCHIECTOMY Left     IN ORCHIECTOMY RADICAL TUMOR INGUINAL APPROACH Left 1/20/2020    Procedure: LEFT RADICAL ORCHIECTOMY INGUINAL, patrial scrotectomy;  Surgeon: Niels Vilchis MD;  Location: [facility];  Service: Urology      Family History   Problem Relation Age of Onset    Hypertension Mother     COPD Mother     Seizures Father     Heart disease Father     Cancer Family       Social History     Tobacco Use    Smoking status: Every Day     Current packs/day: 0.50     Types: Cigarettes    Smokeless tobacco: Former   Vaping Use    Vaping status: Never Used   Substance Use Topics    Alcohol use: Yes     Comment: rare    Drug use: Never      E-Cigarette/Vaping    E-Cigarette Use Never User       E-Cigarette/Vaping Substances    Nicotine No     THC No     CBD No     Flavoring No     Other No     Unknown No       I have reviewed and  agree with the history as documented.     HPI    49-year-old male with history of  shunt presenting for evaluation of chest pain.  Patient states she has been started last night.  He states pain is in the left side of his chest.  Denies any radiation of the pain.  Pain feels like a pressure sensation.  He states he has had pain like this in the past but is unsure what it is from.  He states pain has been intermittent since last night.  It lasts for few minutes and then goes away.  He states pain seems to come on at random.  Not associated with positional changes or exertion.  He has had no associated headaches, fevers, chills, cough, shortness of breath, abdominal pain, nausea, vomiting, or diarrhea.  Denies leg pain or swelling.  Denies recent surgeries.  Denies history of DVT or PE.    Review of Systems   Constitutional:  Negative for appetite change, chills and fever.   HENT:  Negative for congestion, rhinorrhea and sore throat.    Respiratory:  Negative for cough and shortness of breath.    Cardiovascular:  Positive for chest pain. Negative for leg swelling.   Gastrointestinal:  Negative for abdominal pain, diarrhea, nausea and vomiting.   Genitourinary:  Negative for dysuria, frequency, hematuria and urgency.   Musculoskeletal:  Negative for arthralgias and myalgias.   Skin:  Negative for rash.   Neurological:  Negative for dizziness, weakness, light-headedness, numbness and headaches.   All other systems reviewed and are negative.          Objective       ED Triage Vitals [02/20/25 0706]   Temperature Pulse Blood Pressure Respirations SpO2 Patient Position - Orthostatic VS   98.3 °F (36.8 °C) 69 (!) 199/107 18 94 % Lying      Temp Source Heart Rate Source BP Location FiO2 (%) Pain Score    Temporal Monitor Left arm -- 6      Vitals      Date and Time Temp Pulse SpO2 Resp BP Pain Score FACES Pain Rating User   02/20/25 0800 98.6 °F (37 °C) 62 93 % 17 140/85 4 -- CLS   02/20/25 0706 98.3 °F (36.8 °C) 69 94 %  18 199/107 6 -- [place]            Physical Exam  Vitals and nursing note reviewed.   Constitutional:       General: He is not in acute distress.     Appearance: Normal appearance. He is well-developed. He is obese. He is not ill-appearing, toxic-appearing or diaphoretic.   HENT:      Head: Normocephalic and atraumatic.      Right Ear: External ear normal.      Left Ear: External ear normal.      Nose: Nose normal.      Mouth/Throat:      Mouth: Mucous membranes are moist.      Pharynx: Oropharynx is clear.   Eyes:      Extraocular Movements: Extraocular movements intact.      Conjunctiva/sclera: Conjunctivae normal.   Cardiovascular:      Rate and Rhythm: Normal rate and regular rhythm.      Pulses: Normal pulses.      Heart sounds: Normal heart sounds. No murmur heard.     No friction rub. No gallop.   Pulmonary:      Effort: Pulmonary effort is normal. No respiratory distress.      Breath sounds: Normal breath sounds. No decreased breath sounds, wheezing, rhonchi or rales.   Abdominal:      General: There is no distension.      Palpations: Abdomen is soft.      Tenderness: There is no abdominal tenderness. There is no guarding or rebound.   Musculoskeletal:         General: No tenderness.      Cervical back: Neck supple.      Right lower leg: No tenderness. No edema.      Left lower leg: No tenderness. No edema.   Skin:     General: Skin is warm and dry.      Coloration: Skin is not pale.      Findings: No rash.   Neurological:      General: No focal deficit present.      Mental Status: He is alert and oriented to person, place, and time.      Cranial Nerves: No cranial nerve deficit.      Sensory: No sensory deficit.      Motor: No weakness.   Psychiatric:         Mood and Affect: Mood normal.         Behavior: Behavior normal.         Results Reviewed       Procedure Component Value Units Date/Time    HS Troponin 0hr (reflex protocol) [[id number]]  (Normal) Collected: 02/20/25 0724    Lab Status: Final result  Specimen: Blood from Arm, Right Updated: 02/20/25 0754     hs TnI 0hr 3 ng/L     Comprehensive metabolic panel [[id number]] Collected: 02/20/25 0724    Lab Status: Final result Specimen: Blood from Arm, Right Updated: 02/20/25 0748     Sodium 140 mmol/L      Potassium 3.7 mmol/L      Chloride 100 mmol/L      CO2 31 mmol/L      ANION GAP 9 mmol/L      BUN 20 mg/dL      Creatinine 1.05 mg/dL      Glucose 103 mg/dL      Calcium 9.9 mg/dL      AST 17 U/L      ALT 31 U/L      Alkaline Phosphatase 82 U/L      Total Protein 8.0 g/dL      Albumin 4.9 g/dL      Total Bilirubin 0.97 mg/dL      eGFR 83 ml/min/1.73sq m     Narrative:      National Kidney Disease Foundation guidelines for Chronic Kidney Disease (CKD):     Stage 1 with normal or high GFR (GFR > 90 mL/min/1.73 square meters)    Stage 2 Mild CKD (GFR = 60-89 mL/min/1.73 square meters)    Stage 3A Moderate CKD (GFR = 45-59 mL/min/1.73 square meters)    Stage 3B Moderate CKD (GFR = 30-44 mL/min/1.73 square meters)    Stage 4 Severe CKD (GFR = 15-29 mL/min/1.73 square meters)    Stage 5 End Stage CKD (GFR <15 mL/min/1.73 square meters)  Note: GFR calculation is accurate only with a steady state creatinine    CBC and differential [[id number]]  (Abnormal) Collected: 02/20/25 0724    Lab Status: Final result Specimen: Blood from Arm, Right Updated: 02/20/25 0733     WBC 11.48 Thousand/uL      RBC 5.69 Million/uL      Hemoglobin 16.9 g/dL      Hematocrit 50.3 %      MCV 88 fL      MCH 29.7 pg      MCHC 33.6 g/dL      RDW 14.7 %      MPV 9.9 fL      Platelets 359 Thousands/uL      nRBC 0 /100 WBCs      Segmented % 52 %      Immature Grans % 0 %      Lymphocytes % 38 %      Monocytes % 7 %      Eosinophils Relative 2 %      Basophils Relative 1 %      Absolute Neutrophils 6.02 Thousands/µL      Absolute Immature Grans 0.03 Thousand/uL      Absolute Lymphocytes 4.38 Thousands/µL      Absolute Monocytes 0.78 Thousand/µL      Eosinophils Absolute 0.19 Thousand/µL      Basophils  Absolute 0.08 Thousands/µL             XR chest 2 views    (Results Pending)       ECG 12 Lead Documentation Only    Date/Time: 2/20/2025 7:26 AM    Performed by: Jazmin Alonzo MD  Authorized by: Jazmin Alonzo MD    Indications / Diagnosis:  Chest pain  ECG reviewed by me, the ED Provider: yes    Patient location:  ED  Previous ECG:     Previous ECG:  Compared to current    Similarity:  No change    Comparison to cardiac monitor: Yes    Interpretation:     Interpretation: normal    Rate:     ECG rate:  59    ECG rate assessment: bradycardic    Rhythm:     Rhythm: sinus bradycardia    Ectopy:     Ectopy: none    QRS:     QRS axis:  Normal    QRS intervals:  Normal  Conduction:     Conduction: normal    ST segments:     ST segments:  Normal  T waves:     T waves: normal        ED Medication and Procedure Management   Prior to Admission Medications   Prescriptions Last Dose Informant Patient Reported? Taking?   gabapentin (Neurontin) 100 mg capsule   No No   Sig: Take 1 capsule (100 mg total) by mouth 3 (three) times a day as needed (right hip pain)   magnesium oxide (MAG-OX) 400 mg  Self No No   Sig: Take 1 tablet (400 mg total) by mouth 2 (two) times a day   Patient not taking: Reported on 5/16/2022   meloxicam (MOBIC) 15 mg tablet  Self No No   Sig: Take 1 tablet (15 mg total) by mouth daily   Patient not taking: Reported on 5/16/2022   ondansetron (ZOFRAN) 8 mg tablet  Self No No   Sig: Take 1 tablet (8 mg total) by mouth every 8 (eight) hours as needed for nausea or vomiting   Patient not taking: Reported on 5/16/2022      Facility-Administered Medications: None     Discharge Medication List as of 2/20/2025  8:01 AM        CONTINUE these medications which have NOT CHANGED    Details   gabapentin (Neurontin) 100 mg capsule Take 1 capsule (100 mg total) by mouth 3 (three) times a day as needed (right hip pain), Starting Fri 7/22/2022, Normal      magnesium oxide (MAG-OX) 400 mg Take 1 tablet (400 mg total) by  mouth 2 (two) times a day, Starting Tue 3/23/2021, Normal      meloxicam (MOBIC) 15 mg tablet Take 1 tablet (15 mg total) by mouth daily, Starting Tue 12/10/2019, Normal      ondansetron (ZOFRAN) 8 mg tablet Take 1 tablet (8 mg total) by mouth every 8 (eight) hours as needed for nausea or vomiting, Starting Mon 1/18/2021, Normal           No discharge procedures on file.  ED SEPSIS DOCUMENTATION   Time reflects when diagnosis was documented in both MDM as applicable and the Disposition within this note       Time User Action Codes Description Comment    2/20/2025  8:00 AM Jzamin Alonzo Add [R07.89] Atypical chest pain                  Jazmin Alonzo MD  02/20/25 121

## 2025-02-20 NOTE — DISCHARGE INSTRUCTIONS
Please follow up with your primary care doctor. You may take motrin or tylenol for pain as needed.

## 2025-06-11 ENCOUNTER — TELEPHONE (OUTPATIENT)
Dept: INTERNAL MEDICINE CLINIC | Facility: CLINIC | Age: 50
End: 2025-06-11

## 2025-06-26 NOTE — TELEPHONE ENCOUNTER
06/26/25 1:05 AM     The office's request has been received and reviewed.    Patient not on insurance roster.     The PCP has been successfully removed with a patient attribution note.     Please remind staff that pcp removal at practice level for this scenario is not approved.     This message will now be completed.    Thank you  Edith Garcia

## 2025-07-07 ENCOUNTER — APPOINTMENT (OUTPATIENT)
Dept: LAB | Facility: HOSPITAL | Age: 50
End: 2025-07-07
Payer: MEDICARE

## 2025-07-07 DIAGNOSIS — C62.92 TESTICULAR SEMINOMA, LEFT (HCC): ICD-10-CM

## 2025-07-07 LAB
AFP-TM SERPL-MCNC: 5.67 NG/ML (ref 0–9)
ALBUMIN SERPL BCG-MCNC: 4.8 G/DL (ref 3.5–5)
ALP SERPL-CCNC: 84 U/L (ref 34–104)
ALT SERPL W P-5'-P-CCNC: 28 U/L (ref 7–52)
ANION GAP SERPL CALCULATED.3IONS-SCNC: 10 MMOL/L (ref 4–13)
AST SERPL W P-5'-P-CCNC: 16 U/L (ref 13–39)
BASOPHILS # BLD AUTO: 0.06 THOUSANDS/ÂΜL (ref 0–0.1)
BASOPHILS NFR BLD AUTO: 1 % (ref 0–1)
BILIRUB SERPL-MCNC: 1.02 MG/DL (ref 0.2–1)
BUN SERPL-MCNC: 16 MG/DL (ref 5–25)
CALCIUM SERPL-MCNC: 9.8 MG/DL (ref 8.4–10.2)
CHLORIDE SERPL-SCNC: 100 MMOL/L (ref 96–108)
CO2 SERPL-SCNC: 25 MMOL/L (ref 21–32)
CREAT SERPL-MCNC: 1.17 MG/DL (ref 0.6–1.3)
EOSINOPHIL # BLD AUTO: 0.16 THOUSAND/ÂΜL (ref 0–0.61)
EOSINOPHIL NFR BLD AUTO: 2 % (ref 0–6)
ERYTHROCYTE [DISTWIDTH] IN BLOOD BY AUTOMATED COUNT: 14 % (ref 11.6–15.1)
GFR SERPL CREATININE-BSD FRML MDRD: 73 ML/MIN/1.73SQ M
GLUCOSE P FAST SERPL-MCNC: 114 MG/DL (ref 65–99)
HCG-TM SERPL-SCNC: 0.6 MLU/ML
HCT VFR BLD AUTO: 46.8 % (ref 36.5–49.3)
HGB BLD-MCNC: 16.5 G/DL (ref 12–17)
IMM GRANULOCYTES # BLD AUTO: 0.02 THOUSAND/UL (ref 0–0.2)
IMM GRANULOCYTES NFR BLD AUTO: 0 % (ref 0–2)
LYMPHOCYTES # BLD AUTO: 2.07 THOUSANDS/ÂΜL (ref 0.6–4.47)
LYMPHOCYTES NFR BLD AUTO: 23 % (ref 14–44)
MCH RBC QN AUTO: 30.3 PG (ref 26.8–34.3)
MCHC RBC AUTO-ENTMCNC: 35.3 G/DL (ref 31.4–37.4)
MCV RBC AUTO: 86 FL (ref 82–98)
MONOCYTES # BLD AUTO: 0.97 THOUSAND/ÂΜL (ref 0.17–1.22)
MONOCYTES NFR BLD AUTO: 11 % (ref 4–12)
NEUTROPHILS # BLD AUTO: 5.84 THOUSANDS/ÂΜL (ref 1.85–7.62)
NEUTS SEG NFR BLD AUTO: 63 % (ref 43–75)
NRBC BLD AUTO-RTO: 0 /100 WBCS
PLATELET # BLD AUTO: 358 THOUSANDS/UL (ref 149–390)
PMV BLD AUTO: 10 FL (ref 8.9–12.7)
POTASSIUM SERPL-SCNC: 4.1 MMOL/L (ref 3.5–5.3)
PROT SERPL-MCNC: 7.9 G/DL (ref 6.4–8.4)
RBC # BLD AUTO: 5.44 MILLION/UL (ref 3.88–5.62)
SODIUM SERPL-SCNC: 135 MMOL/L (ref 135–147)
WBC # BLD AUTO: 9.12 THOUSAND/UL (ref 4.31–10.16)

## 2025-07-07 PROCEDURE — 82105 ALPHA-FETOPROTEIN SERUM: CPT

## 2025-07-07 PROCEDURE — 36415 COLL VENOUS BLD VENIPUNCTURE: CPT

## 2025-07-07 PROCEDURE — 80053 COMPREHEN METABOLIC PANEL: CPT

## 2025-07-07 PROCEDURE — 85025 COMPLETE CBC W/AUTO DIFF WBC: CPT

## 2025-07-07 PROCEDURE — 84702 CHORIONIC GONADOTROPIN TEST: CPT

## 2025-07-15 ENCOUNTER — HOSPITAL ENCOUNTER (OUTPATIENT)
Dept: CT IMAGING | Facility: HOSPITAL | Age: 50
Discharge: HOME | End: 2025-07-15
Attending: INTERNAL MEDICINE
Payer: MEDICARE

## 2025-07-15 DIAGNOSIS — C62.92 TESTICULAR SEMINOMA, LEFT (HCC): ICD-10-CM

## 2025-07-15 PROCEDURE — 74177 CT ABD & PELVIS W/CONTRAST: CPT

## 2025-07-15 RX ADMIN — IOHEXOL 100 ML: 350 INJECTION, SOLUTION INTRAVENOUS at 07:49

## 2025-07-25 ENCOUNTER — OFFICE VISIT (OUTPATIENT)
Dept: HEMATOLOGY ONCOLOGY | Facility: CLINIC | Age: 50
End: 2025-07-25
Payer: MEDICARE

## 2025-07-25 VITALS
SYSTOLIC BLOOD PRESSURE: 149 MMHG | BODY MASS INDEX: 32.98 KG/M2 | HEIGHT: 74 IN | DIASTOLIC BLOOD PRESSURE: 94 MMHG | WEIGHT: 257 LBS | TEMPERATURE: 97.7 F | HEART RATE: 68 BPM | OXYGEN SATURATION: 97 %

## 2025-07-25 DIAGNOSIS — C62.92 TESTICULAR SEMINOMA, LEFT (HCC): Primary | ICD-10-CM

## 2025-07-25 PROCEDURE — 99213 OFFICE O/P EST LOW 20 MIN: CPT | Performed by: INTERNAL MEDICINE

## 2025-07-25 PROCEDURE — G2211 COMPLEX E/M VISIT ADD ON: HCPCS | Performed by: INTERNAL MEDICINE

## 2025-07-25 NOTE — PROGRESS NOTES
Name: Claus Herrera      : 1975      MRN: [id number]  Encounter Provider: Francesco Booker DO  Encounter Date: 2025   Encounter department: [facility] [place]  :  Assessment & Plan  Testicular seminoma, left (HCC)  Patient is clinically stable.  No new medical issues or symptoms to report.  Recent CBC and CMP are normal.  Tumor markers likewise.  CT abdomen and pelvis shows right renal cyst, hernia, no malignant suspicious findings.  Follow-up in 6 months with blood work and imaging.  Repeat CAT scan in 1 year.  Likely discharge thereafter.  Orders:  •  XR chest pa and lateral; Future  •  CBC and differential; Future  •  Comprehensive metabolic panel; Future  •  AFP tumor marker; Future  •  HCG, tumor marker; Future        Return in about 6 months (around 2026) for Imaging - See orders, Labs - See orders.    History of Present Illness   No chief complaint on file.    Oncology History   Cancer Staging   No matching staging information was found for the patient.  Oncology History Overview Note   Patient presents today for radiation consult for testicular cancer, referred by Dr. Vilchis.    44 year old male with a history of developmental delay,  shunt placed at birth. He was evaluated by his PCP in 2020 for scrotal swelling. US revealed left testicular mass, he was referred to Urology.    1/15/20 US scrotum and testicles  IMPRESSION:   Markedly enlarged and abnormal left testicle with normal vascular flow on Doppler interrogation.  The finding raises the suspicion of infiltrating process and possibly testicular malignancy (perhaps testicular lymphoma) especially given the history of slowly progressive testicular swelling.  FINDINGS:   TESTES:    RIGHT testis = 4.5 x 1.7 x 2.8 cm   The right testicle demonstrates normal contour with homogeneous smooth echotexture.  No right-sided intratesticular mass lesion or calcifications.   LEFT testis = 15.1 x 9.7 x  14.0 cm  The left testicle is markedly enlarged with profoundly heterogeneous echotexture.     1/17/20 Urology, Dr. Vilchis  Plan for left radical orchiectomy and staging imaging.     1/20/20 Left radical orchiectomy     2/3/20 Urology follow-up, Dr. 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 Scrotal skin was negative for tumor involvement, no tumor was seen at the spermatic cord or the spermatic cord margin, and no penetration through the tunica vaginalis was seen.    Plan for CT abd/pelvis and chest xray, recheck lab work. Follow-up to review and referral at that time.     2/5/20 Bloodwork:  AFP tumor marker: 5.0  HCG tumor marker: < 1  HCG, Quantitative: < 0.6      2/7/20 CT Abd/Pelvis  IMPRESSION:   1.  No evidence of metastatic disease in the abdomen or pelvis.   2.  Large collection in the left hemiscrotum with central hyperdensity likely representing hematoma.  Clinical correlation recommended.   3.  Postoperative stranding in the left inguinal region with prominent lymph nodes which are likely reactive.   4.  Cholelithiasis.     2/7/20 XR chest pa & lateral  IMPRESSION:   No acute cardiopulmonary disease.    2/17/20 Urology f/u with Dr. Vilchis  CT negative for metastases, HCG and AFP markers are negative.   Refer to Radiation Oncology for consideration of hockey-stick adjuvant radiation therapy.   Scrotal hematoma mostly resolved, no signs of infection, follow-up in 3 months.    2/18/20 Multidisciplinary Urology Case Review  Physician Recommended Plan:  Referral to Radiation Oncology and Medical Oncology.       2/21/20 Dr. Booker, Med Onc Consult  Stage IB seminoma. Favor observation. Prophylactic radiation therapy or chemotherapy are reasonable to consider.   Reviewed that likelihood of cure is high, observation is reasonable with a goal of avoiding unnecessary treatment. If recurrent, salvage is generally quite effective.   Pt and mother prefer avoiding therapy unless  necessary.  Plan for follow-up in May with repeat imaging and bloodwork to initiate surveillance.      5/8/20 CT abd/pelvis and CXR  5/12/20 Dr. Vilchis Urology follow-up  5/15/20 Med Onc Dr. Booker follow-up           Testicular seminoma, left (HCC)   1/2020 Initial Diagnosis    Testicular seminoma, left (HCC)     1/20/2020 Surgery    Left radical orchiectomy inguinal, patrial scrotectomy (Left)-combination of inguinal and scrotal approach due to size     A. Left testis and spermatic cord:  - Seminoma, 15.5 cm in greatest dimension, with extension beyond tunica albuginea as evidenced by tumor in epididymis and in hilar fat. No penetration through tunica vaginalis is appreciated.  - No tumor seen within spermatic cord or at spermatic cord margin  - See synoptic report below     B. Scrotal skin:  - No tumor seen.        1/18/2021 - 4/9/2021 Chemotherapy    pegfilgrastim (NEULASTA ONPRO), 6 mg, Subcutaneous, Once, 4 of 4 cycles  Administration: 6 mg (1/29/2021), 6 mg (2/22/2021), 6 mg (3/12/2021), 6 mg (4/9/2021)  CISplatin (PLATINOL) infusion, 48.8 mg, Intravenous, Once, 4 of 4 cycles  Administration: 50 mg (1/18/2021), 50 mg (1/19/2021), 50 mg (2/15/2021), 50 mg (1/20/2021), 50 mg (1/28/2021), 50 mg (1/29/2021), 50 mg (2/16/2021), 50 mg (3/8/2021), 50 mg (2/17/2021), 50 mg (2/18/2021), 50 mg (2/22/2021), 50 mg (3/9/2021), 50 mg (4/5/2021), 50 mg (3/10/2021), 50 mg (3/11/2021), 50 mg (3/12/2021), 50 mg (4/9/2021), 50 mg (4/8/2021), 50 mg (4/6/2021), 50 mg (4/7/2021)  fosaprepitant (EMEND) IVPB, 150 mg, Intravenous, Once, 4 of 4 cycles  Administration: 150 mg (1/18/2021), 150 mg (2/15/2021), 150 mg (3/8/2021), 150 mg (4/5/2021), 150 mg (1/28/2021)  etoposide (TOPOSAR) in NSS infusion, 100 mg/m2 = 244 mg, Intravenous, Once, 4 of 4 cycles  Administration: 244 mg (1/18/2021), 244 mg (1/19/2021), 244 mg (2/15/2021), 244 mg (1/20/2021), 244 mg (1/28/2021), 244 mg (1/29/2021), 244 mg (2/16/2021), 244 mg (3/8/2021), 244 mg  "(2/17/2021), 244 mg (2/18/2021), 244 mg (2/22/2021), 244 mg (3/9/2021), 244 mg (4/5/2021), 244 mg (3/10/2021), 244 mg (3/11/2021), 244 mg (3/12/2021), 244 mg (4/9/2021), 244 mg (4/8/2021), 244 mg (4/6/2021), 244 mg (4/7/2021)     7/25/2025 -  Cancer Staged    Staging form: Testis, AJCC 8th Edition  - Pathologic: Stage I (pT2, pN0, cM0, SX) - Signed by Francesco Booker DO on 7/25/2025  Stage prefix: Initial diagnosis          Pertinent Medical History   Jan 2020-resected seminoma, stage I. Recurred in late 2020 retroperitoneum, needle biopsy. He was treated with -16, cisplatin x 4 ending April 2021.      Review of Systems   Constitutional:  Negative for chills and fever.   HENT:  Negative for nosebleeds.    Eyes:  Negative for discharge.   Respiratory:  Negative for cough and shortness of breath.    Cardiovascular:  Negative for chest pain.   Gastrointestinal:  Negative for abdominal pain, constipation and diarrhea.   Endocrine: Negative for polydipsia.   Genitourinary:  Negative for hematuria.   Musculoskeletal:  Negative for arthralgias.   Skin:  Negative for color change.   Allergic/Immunologic: Negative for immunocompromised state.   Neurological:  Negative for dizziness and headaches.   Hematological:  Negative for adenopathy.   Psychiatric/Behavioral:  Negative for agitation.            Objective   /94 (BP Location: Left arm, Patient Position: Sitting, Cuff Size: Adult)   Pulse 68   Temp 97.7 °F (36.5 °C) (Temporal)   Ht 6' 2\" (1.88 m)   Wt 117 kg (257 lb)   SpO2 97%   BMI 33.00 kg/m²     Pain Screening:     ECOG ECOG Performance Status: 2 - Ambulatory and capable of all selfcare but unable to carry out any work activities.  Up and about more than 50% of waking hours   Physical Exam  Constitutional:       Appearance: He is well-developed.   HENT:      Head: Normocephalic and atraumatic.      Mouth/Throat:      Mouth: Mucous membranes are moist.     Eyes:      Pupils: Pupils are equal, round, and " reactive to light.       Cardiovascular:      Rate and Rhythm: Normal rate and regular rhythm.      Heart sounds: No murmur heard.  Pulmonary:      Breath sounds: Normal breath sounds. No wheezing or rales.   Abdominal:      Palpations: Abdomen is soft.      Tenderness: There is no abdominal tenderness.     Musculoskeletal:         General: No tenderness. Normal range of motion.      Cervical back: Neck supple.   Lymphadenopathy:      Cervical: No cervical adenopathy.     Skin:     Findings: No erythema or rash.     Neurological:      Mental Status: He is alert and oriented to person, place, and time.      Cranial Nerves: No cranial nerve deficit.      Deep Tendon Reflexes: Reflexes are normal and symmetric.     Psychiatric:         Behavior: Behavior normal.         Labs: I have reviewed the following labs:  Lab Results   Component Value Date/Time    WBC 9.12 07/07/2025 09:56 AM    RBC 5.44 07/07/2025 09:56 AM    Hemoglobin 16.5 07/07/2025 09:56 AM    Hematocrit 46.8 07/07/2025 09:56 AM    MCV 86 07/07/2025 09:56 AM    MCH 30.3 07/07/2025 09:56 AM    RDW 14.0 07/07/2025 09:56 AM    Platelets 358 07/07/2025 09:56 AM    Segmented % 63 07/07/2025 09:56 AM    Lymphocytes % 23 07/07/2025 09:56 AM    Monocytes % 11 07/07/2025 09:56 AM    Eosinophils Relative 2 07/07/2025 09:56 AM    Basophils Relative 1 07/07/2025 09:56 AM    Immature Grans % 0 07/07/2025 09:56 AM    Absolute Neutrophils 5.84 07/07/2025 09:56 AM     Lab Results   Component Value Date/Time    Potassium 4.1 07/07/2025 09:56 AM    Chloride 100 07/07/2025 09:56 AM    CO2 25 07/07/2025 09:56 AM    BUN 16 07/07/2025 09:56 AM    Creatinine 1.17 07/07/2025 09:56 AM    Glucose, Fasting 114 (H) 07/07/2025 09:56 AM    Calcium 9.8 07/07/2025 09:56 AM    AST 16 07/07/2025 09:56 AM    ALT 28 07/07/2025 09:56 AM    Alkaline Phosphatase 84 07/07/2025 09:56 AM    Total Protein 7.9 07/07/2025 09:56 AM    Albumin 4.8 07/07/2025 09:56 AM    Total Bilirubin 1.02 (H)  07/07/2025 09:56 AM    eGFR 73 07/07/2025 09:56 AM

## 2025-07-25 NOTE — ASSESSMENT & PLAN NOTE
Patient is clinically stable.  No new medical issues or symptoms to report.  Recent CBC and CMP are normal.  Tumor markers likewise.  CT abdomen and pelvis shows right renal cyst, hernia, no malignant suspicious findings.  Follow-up in 6 months with blood work and imaging.  Repeat CAT scan in 1 year.  Likely discharge thereafter.  Orders:  •  XR chest pa and lateral; Future  •  CBC and differential; Future  •  Comprehensive metabolic panel; Future  •  AFP tumor marker; Future  •  HCG, tumor marker; Future

## (undated) DEVICE — INTENDED FOR TISSUE SEPARATION, AND OTHER PROCEDURES THAT REQUIRE A SHARP SURGICAL BLADE TO PUNCTURE OR CUT.: Brand: BARD-PARKER SAFETY BLADES SIZE 15, STERILE

## (undated) DEVICE — PLUMEPEN PRO 10FT

## (undated) DEVICE — STRL PENROSE DRAIN 18" X 1/4": Brand: CARDINAL HEALTH

## (undated) DEVICE — GAUZE SPONGES,16 PLY: Brand: CURITY

## (undated) DEVICE — SKIN MARKER DUAL TIP WITH RULER CAP, FLEXIBLE RULER AND LABELS: Brand: DEVON

## (undated) DEVICE — SCROTAL SUPPORT LGE

## (undated) DEVICE — SCD SEQUENTIAL COMPRESSION COMFORT SLEEVE MEDIUM KNEE LENGTH: Brand: KENDALL SCD

## (undated) DEVICE — ADHESIVE SKIN HIGH VISCOSITY EXOFIN 1ML

## (undated) DEVICE — TUBING SUCTION 5MM X 12 FT

## (undated) DEVICE — GLOVE SRG BIOGEL 7

## (undated) DEVICE — BETHLEHEM UNIVERSAL MINOR GEN: Brand: CARDINAL HEALTH

## (undated) DEVICE — SPONGE STICK WITH PVP-I: Brand: KENDALL

## (undated) DEVICE — GAUZE SPONGES,USP TYPE VII GAUZE, 12 PLY: Brand: CURITY

## (undated) DEVICE — CHLORAPREP HI-LITE 26ML ORANGE

## (undated) DEVICE — NEEDLE 25G X 1 1/2

## (undated) DEVICE — POOLE SUCTION HANDLE: Brand: CARDINAL HEALTH

## (undated) DEVICE — DRAPE EQUIPMENT RF WAND